# Patient Record
Sex: FEMALE | Race: WHITE | NOT HISPANIC OR LATINO | Employment: OTHER | URBAN - METROPOLITAN AREA
[De-identification: names, ages, dates, MRNs, and addresses within clinical notes are randomized per-mention and may not be internally consistent; named-entity substitution may affect disease eponyms.]

---

## 2017-01-30 ENCOUNTER — AMBULATORY - HEALTHEAST (OUTPATIENT)
Dept: CARDIOLOGY | Facility: CLINIC | Age: 67
End: 2017-01-30

## 2017-01-30 DIAGNOSIS — Z95.0 PACEMAKER: ICD-10-CM

## 2017-02-02 ENCOUNTER — COMMUNICATION - HEALTHEAST (OUTPATIENT)
Dept: FAMILY MEDICINE | Facility: CLINIC | Age: 67
End: 2017-02-02

## 2017-02-02 DIAGNOSIS — G47.09 OTHER INSOMNIA: ICD-10-CM

## 2017-03-20 ENCOUNTER — AMBULATORY - HEALTHEAST (OUTPATIENT)
Dept: CARDIOLOGY | Facility: CLINIC | Age: 67
End: 2017-03-20

## 2017-03-20 DIAGNOSIS — Z95.0 PACEMAKER: ICD-10-CM

## 2017-03-24 ENCOUNTER — OFFICE VISIT - HEALTHEAST (OUTPATIENT)
Dept: CARDIOLOGY | Facility: CLINIC | Age: 67
End: 2017-03-24

## 2017-03-24 DIAGNOSIS — I10 ESSENTIAL HYPERTENSION WITH GOAL BLOOD PRESSURE LESS THAN 140/90: ICD-10-CM

## 2017-03-24 DIAGNOSIS — R06.00 DYSPNEA: ICD-10-CM

## 2017-03-24 DIAGNOSIS — I49.5 SICK SINUS SYNDROME (H): ICD-10-CM

## 2017-03-24 ASSESSMENT — MIFFLIN-ST. JEOR: SCORE: 1224.83

## 2017-03-27 ENCOUNTER — AMBULATORY - HEALTHEAST (OUTPATIENT)
Dept: CARDIOLOGY | Facility: CLINIC | Age: 67
End: 2017-03-27

## 2017-03-27 DIAGNOSIS — R94.39 OTHER NONSPECIFIC ABNORMAL CARDIOVASCULAR SYSTEM FUNCTION STUDY: ICD-10-CM

## 2017-03-27 DIAGNOSIS — I42.9 CARDIOMYOPATHY (H): ICD-10-CM

## 2017-04-06 ENCOUNTER — COMMUNICATION - HEALTHEAST (OUTPATIENT)
Dept: CARDIOLOGY | Facility: CLINIC | Age: 67
End: 2017-04-06

## 2017-04-11 ENCOUNTER — HOSPITAL ENCOUNTER (OUTPATIENT)
Dept: CT IMAGING | Facility: CLINIC | Age: 67
Discharge: HOME | End: 2017-04-11
Attending: INTERNAL MEDICINE

## 2017-04-11 DIAGNOSIS — R94.39 OTHER NONSPECIFIC ABNORMAL CARDIOVASCULAR SYSTEM FUNCTION STUDY: ICD-10-CM

## 2017-04-11 LAB
BSA FOR ECHO PROCEDURE: 1.82 M2
LEFT VENTRICLE HEART RATE: 85 BPM

## 2017-04-11 ASSESSMENT — MIFFLIN-ST. JEOR: SCORE: 1231.64

## 2017-04-18 ENCOUNTER — COMMUNICATION - HEALTHEAST (OUTPATIENT)
Dept: FAMILY MEDICINE | Facility: CLINIC | Age: 67
End: 2017-04-18

## 2017-04-18 DIAGNOSIS — R06.02 SHORTNESS OF BREATH: ICD-10-CM

## 2017-04-19 ENCOUNTER — AMBULATORY - HEALTHEAST (OUTPATIENT)
Dept: PULMONOLOGY | Facility: OTHER | Age: 67
End: 2017-04-19

## 2017-04-19 DIAGNOSIS — R06.02 SOB (SHORTNESS OF BREATH): ICD-10-CM

## 2017-06-12 ENCOUNTER — OFFICE VISIT - HEALTHEAST (OUTPATIENT)
Dept: PULMONOLOGY | Facility: OTHER | Age: 67
End: 2017-06-12

## 2017-06-12 ENCOUNTER — RECORDS - HEALTHEAST (OUTPATIENT)
Dept: PULMONOLOGY | Facility: OTHER | Age: 67
End: 2017-06-12

## 2017-06-12 ENCOUNTER — RECORDS - HEALTHEAST (OUTPATIENT)
Dept: ADMINISTRATIVE | Facility: OTHER | Age: 67
End: 2017-06-12

## 2017-06-12 DIAGNOSIS — R06.02 SHORTNESS OF BREATH: ICD-10-CM

## 2017-06-12 DIAGNOSIS — R53.81 PHYSICAL DECONDITIONING: ICD-10-CM

## 2017-06-12 DIAGNOSIS — R06.09 DYSPNEA ON EXERTION: ICD-10-CM

## 2017-06-12 ASSESSMENT — MIFFLIN-ST. JEOR: SCORE: 1256.81

## 2017-07-17 ENCOUNTER — OFFICE VISIT - HEALTHEAST (OUTPATIENT)
Dept: FAMILY MEDICINE | Facility: CLINIC | Age: 67
End: 2017-07-17

## 2017-07-17 DIAGNOSIS — I10 HYPERTENSION: ICD-10-CM

## 2017-07-17 DIAGNOSIS — G47.09 OTHER INSOMNIA: ICD-10-CM

## 2017-07-17 DIAGNOSIS — R53.83 FATIGUE: ICD-10-CM

## 2017-07-17 DIAGNOSIS — Z00.00 HEALTHCARE MAINTENANCE: ICD-10-CM

## 2017-07-17 DIAGNOSIS — D64.9 ANEMIA: ICD-10-CM

## 2017-07-17 DIAGNOSIS — R73.9 HYPERGLYCEMIA: ICD-10-CM

## 2017-07-17 DIAGNOSIS — G45.4 TGA (TRANSIENT GLOBAL AMNESIA): ICD-10-CM

## 2017-07-17 LAB
CHOLEST SERPL-MCNC: 204 MG/DL
FASTING STATUS PATIENT QL REPORTED: YES
HDLC SERPL-MCNC: 78 MG/DL
LDLC SERPL CALC-MCNC: 111 MG/DL
TRIGL SERPL-MCNC: 75 MG/DL

## 2017-07-17 ASSESSMENT — MIFFLIN-ST. JEOR: SCORE: 1211.22

## 2017-07-18 LAB
HBA1C MFR BLD: 6 % (ref 4.2–6.1)
HCV RNA DETECT/QUANT, S - HISTORICAL: NORMAL IU/ML

## 2017-07-19 ENCOUNTER — COMMUNICATION - HEALTHEAST (OUTPATIENT)
Dept: FAMILY MEDICINE | Facility: CLINIC | Age: 67
End: 2017-07-19

## 2017-07-20 ENCOUNTER — AMBULATORY - HEALTHEAST (OUTPATIENT)
Dept: CARDIOLOGY | Facility: CLINIC | Age: 67
End: 2017-07-20

## 2017-07-20 DIAGNOSIS — Z95.0 CARDIAC PACEMAKER IN SITU: ICD-10-CM

## 2017-07-20 LAB — HCC DEVICE COMMENTS: NORMAL

## 2017-07-20 ASSESSMENT — MIFFLIN-ST. JEOR: SCORE: 1220.29

## 2017-09-19 ENCOUNTER — AMBULATORY - HEALTHEAST (OUTPATIENT)
Dept: NURSING | Facility: CLINIC | Age: 67
End: 2017-09-19

## 2017-09-19 DIAGNOSIS — Z23 NEEDS FLU SHOT: ICD-10-CM

## 2017-09-20 ENCOUNTER — HOSPITAL ENCOUNTER (OUTPATIENT)
Dept: MAMMOGRAPHY | Facility: HOSPITAL | Age: 67
Discharge: HOME | End: 2017-09-20
Attending: FAMILY MEDICINE

## 2017-09-20 DIAGNOSIS — Z12.31 VISIT FOR SCREENING MAMMOGRAM: ICD-10-CM

## 2017-09-25 ENCOUNTER — OFFICE VISIT - HEALTHEAST (OUTPATIENT)
Dept: CARDIOLOGY | Facility: CLINIC | Age: 67
End: 2017-09-25

## 2017-09-25 DIAGNOSIS — Z95.0 CARDIAC PACEMAKER IN SITU: ICD-10-CM

## 2017-09-25 DIAGNOSIS — I10 ESSENTIAL HYPERTENSION: ICD-10-CM

## 2017-09-25 ASSESSMENT — MIFFLIN-ST. JEOR: SCORE: 1242.97

## 2017-10-18 ENCOUNTER — AMBULATORY - HEALTHEAST (OUTPATIENT)
Dept: CARDIOLOGY | Facility: CLINIC | Age: 67
End: 2017-10-18

## 2017-10-18 DIAGNOSIS — Z95.0 CARDIAC PACEMAKER IN SITU: ICD-10-CM

## 2017-10-18 LAB — HCC DEVICE COMMENTS: NORMAL

## 2017-10-30 ENCOUNTER — COMMUNICATION - HEALTHEAST (OUTPATIENT)
Dept: FAMILY MEDICINE | Facility: CLINIC | Age: 67
End: 2017-10-30

## 2017-10-31 ENCOUNTER — OFFICE VISIT - HEALTHEAST (OUTPATIENT)
Dept: FAMILY MEDICINE | Facility: CLINIC | Age: 67
End: 2017-10-31

## 2017-10-31 DIAGNOSIS — B02.9 SHINGLES: ICD-10-CM

## 2017-10-31 ASSESSMENT — MIFFLIN-ST. JEOR: SCORE: 1252.04

## 2017-11-06 ENCOUNTER — COMMUNICATION - HEALTHEAST (OUTPATIENT)
Dept: FAMILY MEDICINE | Facility: CLINIC | Age: 67
End: 2017-11-06

## 2017-11-07 ENCOUNTER — COMMUNICATION - HEALTHEAST (OUTPATIENT)
Dept: FAMILY MEDICINE | Facility: CLINIC | Age: 67
End: 2017-11-07

## 2017-11-21 ENCOUNTER — OFFICE VISIT - HEALTHEAST (OUTPATIENT)
Dept: FAMILY MEDICINE | Facility: CLINIC | Age: 67
End: 2017-11-21

## 2017-11-21 DIAGNOSIS — B35.1 ONYCHOMYCOSIS: ICD-10-CM

## 2017-11-21 DIAGNOSIS — E61.1 IRON DEFICIENCY: ICD-10-CM

## 2017-11-21 DIAGNOSIS — G47.09 OTHER INSOMNIA: ICD-10-CM

## 2017-11-21 ASSESSMENT — MIFFLIN-ST. JEOR: SCORE: 1249.21

## 2018-01-22 ENCOUNTER — AMBULATORY - HEALTHEAST (OUTPATIENT)
Dept: LAB | Facility: CLINIC | Age: 68
End: 2018-01-22

## 2018-01-22 ENCOUNTER — HOSPITAL ENCOUNTER (OUTPATIENT)
Dept: LAB | Age: 68
Setting detail: SPECIMEN
Discharge: HOME | End: 2018-01-22

## 2018-01-22 DIAGNOSIS — E61.1 IRON DEFICIENCY: ICD-10-CM

## 2018-01-22 LAB
ERYTHROCYTE [DISTWIDTH] IN BLOOD BY AUTOMATED COUNT: 19.3 % (ref 11–14.5)
HCT VFR BLD AUTO: 45.5 % (ref 35–47)
HGB BLD-MCNC: 15.1 G/DL (ref 12–16)
IRON SATN MFR SERPL: 34 % (ref 20–50)
IRON SERPL-MCNC: 122 UG/DL (ref 42–175)
MCH RBC QN AUTO: 30 PG (ref 27–34)
MCHC RBC AUTO-ENTMCNC: 33.2 G/DL (ref 32–36)
MCV RBC AUTO: 90 FL (ref 80–100)
PLATELET # BLD AUTO: 232 THOU/UL (ref 140–440)
PMV BLD AUTO: 8.3 FL (ref 7–10)
RBC # BLD AUTO: 5.05 MILL/UL (ref 3.8–5.4)
TIBC SERPL-MCNC: 362 UG/DL (ref 313–563)
TRANSFERRIN SERPL-MCNC: 289 MG/DL (ref 212–360)
WBC: 5.4 THOU/UL (ref 4–11)

## 2018-01-24 ENCOUNTER — COMMUNICATION - HEALTHEAST (OUTPATIENT)
Dept: CARDIOLOGY | Facility: CLINIC | Age: 68
End: 2018-01-24

## 2018-01-24 DIAGNOSIS — I10 ESSENTIAL HYPERTENSION WITH GOAL BLOOD PRESSURE LESS THAN 140/90: ICD-10-CM

## 2018-01-25 ENCOUNTER — COMMUNICATION - HEALTHEAST (OUTPATIENT)
Dept: FAMILY MEDICINE | Facility: CLINIC | Age: 68
End: 2018-01-25

## 2018-01-29 ENCOUNTER — AMBULATORY - HEALTHEAST (OUTPATIENT)
Dept: CARDIOLOGY | Facility: CLINIC | Age: 68
End: 2018-01-29

## 2018-01-29 DIAGNOSIS — Z95.0 CARDIAC PACEMAKER IN SITU: ICD-10-CM

## 2018-01-29 LAB — HCC DEVICE COMMENTS: NORMAL

## 2018-01-30 ENCOUNTER — COMMUNICATION - HEALTHEAST (OUTPATIENT)
Dept: FAMILY MEDICINE | Facility: CLINIC | Age: 68
End: 2018-01-30

## 2018-01-30 DIAGNOSIS — D64.9 ANEMIA: ICD-10-CM

## 2018-02-05 ENCOUNTER — COMMUNICATION - HEALTHEAST (OUTPATIENT)
Dept: CARDIOLOGY | Facility: CLINIC | Age: 68
End: 2018-02-05

## 2018-02-05 DIAGNOSIS — I10 ESSENTIAL HYPERTENSION WITH GOAL BLOOD PRESSURE LESS THAN 140/90: ICD-10-CM

## 2018-02-08 ENCOUNTER — HOSPITAL ENCOUNTER (OUTPATIENT)
Dept: LAB | Age: 68
Setting detail: SPECIMEN
Discharge: HOME | End: 2018-02-08

## 2018-02-08 ENCOUNTER — AMBULATORY - HEALTHEAST (OUTPATIENT)
Dept: FAMILY MEDICINE | Facility: CLINIC | Age: 68
End: 2018-02-08

## 2018-02-08 ENCOUNTER — AMBULATORY - HEALTHEAST (OUTPATIENT)
Dept: LAB | Facility: CLINIC | Age: 68
End: 2018-02-08

## 2018-02-08 DIAGNOSIS — Z09 NEED FOR IMMUNIZATION FOLLOW-UP: ICD-10-CM

## 2018-02-09 ENCOUNTER — COMMUNICATION - HEALTHEAST (OUTPATIENT)
Dept: FAMILY MEDICINE | Facility: CLINIC | Age: 68
End: 2018-02-09

## 2018-02-09 LAB
MEV IGG SER IA-ACNC: NORMAL
MUV IGG SER QL IA: NORMAL
RUBV IGG SERPL QL IA: NORMAL
VZV IGG SER QL IA: NORMAL

## 2018-02-16 ENCOUNTER — RECORDS - HEALTHEAST (OUTPATIENT)
Dept: LAB | Facility: HOSPITAL | Age: 68
End: 2018-02-16

## 2018-02-19 LAB
QTF INTERPRETATION: NORMAL
QTF MITOGEN - NIL: >10 IU/ML
QTF NIL: 0.06 IU/ML
QTF RESULT: NEGATIVE
QTF TB ANTIGEN - NIL: 0.02 IU/ML

## 2018-02-22 ENCOUNTER — OFFICE VISIT - HEALTHEAST (OUTPATIENT)
Dept: FAMILY MEDICINE | Facility: CLINIC | Age: 68
End: 2018-02-22

## 2018-02-22 DIAGNOSIS — R32 INCONTINENCE: ICD-10-CM

## 2018-02-22 DIAGNOSIS — N89.8 ITCHING IN THE VAGINAL AREA: ICD-10-CM

## 2018-02-22 ASSESSMENT — MIFFLIN-ST. JEOR: SCORE: 1256.58

## 2018-04-09 ENCOUNTER — COMMUNICATION - HEALTHEAST (OUTPATIENT)
Dept: FAMILY MEDICINE | Facility: CLINIC | Age: 68
End: 2018-04-09

## 2018-04-09 ENCOUNTER — AMBULATORY - HEALTHEAST (OUTPATIENT)
Dept: LAB | Facility: CLINIC | Age: 68
End: 2018-04-09

## 2018-04-09 DIAGNOSIS — G45.4 TGA (TRANSIENT GLOBAL AMNESIA): ICD-10-CM

## 2018-04-09 DIAGNOSIS — G47.09 OTHER INSOMNIA: ICD-10-CM

## 2018-04-09 DIAGNOSIS — D64.9 ANEMIA: ICD-10-CM

## 2018-04-09 LAB
ERYTHROCYTE [DISTWIDTH] IN BLOOD BY AUTOMATED COUNT: 11.2 % (ref 11–14.5)
HCT VFR BLD AUTO: 42.8 % (ref 35–47)
HGB BLD-MCNC: 14.2 G/DL (ref 12–16)
MCH RBC QN AUTO: 32 PG (ref 27–34)
MCHC RBC AUTO-ENTMCNC: 33.3 G/DL (ref 32–36)
MCV RBC AUTO: 96 FL (ref 80–100)
PLATELET # BLD AUTO: 227 THOU/UL (ref 140–440)
PMV BLD AUTO: 7.7 FL (ref 7–10)
RBC # BLD AUTO: 4.45 MILL/UL (ref 3.8–5.4)
WBC: 5.1 THOU/UL (ref 4–11)

## 2018-04-20 ENCOUNTER — COMMUNICATION - HEALTHEAST (OUTPATIENT)
Dept: FAMILY MEDICINE | Facility: CLINIC | Age: 68
End: 2018-04-20

## 2018-04-27 ENCOUNTER — OFFICE VISIT - HEALTHEAST (OUTPATIENT)
Dept: FAMILY MEDICINE | Facility: CLINIC | Age: 68
End: 2018-04-27

## 2018-04-27 DIAGNOSIS — R05.9 COUGH: ICD-10-CM

## 2018-04-27 ASSESSMENT — MIFFLIN-ST. JEOR: SCORE: 1247.51

## 2018-05-07 ENCOUNTER — AMBULATORY - HEALTHEAST (OUTPATIENT)
Dept: CARDIOLOGY | Facility: CLINIC | Age: 68
End: 2018-05-07

## 2018-05-07 DIAGNOSIS — Z95.0 CARDIAC PACEMAKER IN SITU: ICD-10-CM

## 2018-05-07 LAB
HCC DEVICE COMMENTS: NORMAL
HCC DEVICE IMPLANTING PROVIDER: NORMAL
HCC DEVICE MANUFACTURE: NORMAL
HCC DEVICE MODEL: NORMAL
HCC DEVICE SERIAL NUMBER: NORMAL
HCC DEVICE TYPE: NORMAL

## 2018-05-08 ENCOUNTER — OFFICE VISIT - HEALTHEAST (OUTPATIENT)
Dept: FAMILY MEDICINE | Facility: CLINIC | Age: 68
End: 2018-05-08

## 2018-05-08 DIAGNOSIS — R05.9 COUGH: ICD-10-CM

## 2018-05-08 DIAGNOSIS — G45.4 TGA (TRANSIENT GLOBAL AMNESIA): ICD-10-CM

## 2018-05-08 ASSESSMENT — MIFFLIN-ST. JEOR: SCORE: 1244.39

## 2018-06-04 ENCOUNTER — OFFICE VISIT - HEALTHEAST (OUTPATIENT)
Dept: ENDOCRINOLOGY | Facility: CLINIC | Age: 68
End: 2018-06-04

## 2018-06-04 ENCOUNTER — COMMUNICATION - HEALTHEAST (OUTPATIENT)
Dept: GENERAL RADIOLOGY | Facility: CLINIC | Age: 68
End: 2018-06-04

## 2018-06-04 DIAGNOSIS — L65.9 HAIR LOSS: ICD-10-CM

## 2018-06-04 DIAGNOSIS — R05.9 COUGH: ICD-10-CM

## 2018-06-04 LAB
CREAT SERPL-MCNC: 0.74 MG/DL (ref 0.6–1.1)
ESTRADIOL SERPL-MCNC: <10 PG/ML
FERRITIN SERPL-MCNC: 43 NG/ML (ref 10–130)
FSH SERPL-ACNC: 47.7 MIU/ML
GFR SERPL CREATININE-BSD FRML MDRD: >60 ML/MIN/1.73M2
HBA1C MFR BLD: 5.5 % (ref 3.5–6)
IRON SERPL-MCNC: 88 UG/DL (ref 42–175)
LH SERPL-ACNC: 18.2 MIU/ML
POTASSIUM BLD-SCNC: 4 MMOL/L (ref 3.5–5)
T3 SERPL-MCNC: 64 NG/DL (ref 45–175)
T4 FREE SERPL-MCNC: 1 NG/DL (ref 0.7–1.8)
TSH SERPL DL<=0.005 MIU/L-ACNC: 2.6 UIU/ML (ref 0.3–5)
VIT B12 SERPL-MCNC: 349 PG/ML (ref 213–816)

## 2018-06-04 ASSESSMENT — MIFFLIN-ST. JEOR: SCORE: 1210.31

## 2018-06-06 ENCOUNTER — OFFICE VISIT - HEALTHEAST (OUTPATIENT)
Dept: FAMILY MEDICINE | Facility: CLINIC | Age: 68
End: 2018-06-06

## 2018-06-06 DIAGNOSIS — L55.9 SUNBURN: ICD-10-CM

## 2018-06-06 DIAGNOSIS — R05.9 COUGH: ICD-10-CM

## 2018-06-06 ASSESSMENT — MIFFLIN-ST. JEOR: SCORE: 1211.22

## 2018-06-07 LAB — ZINC SERPL-MCNC: 64 UG/DL (ref 60–120)

## 2018-06-11 ENCOUNTER — COMMUNICATION - HEALTHEAST (OUTPATIENT)
Dept: ADMINISTRATIVE | Facility: CLINIC | Age: 68
End: 2018-06-11

## 2018-06-11 DIAGNOSIS — E03.9 HYPOTHYROID: ICD-10-CM

## 2018-06-13 ENCOUNTER — COMMUNICATION - HEALTHEAST (OUTPATIENT)
Dept: ENDOCRINOLOGY | Facility: CLINIC | Age: 68
End: 2018-06-13

## 2018-06-13 DIAGNOSIS — E03.9 HYPOTHYROID: ICD-10-CM

## 2018-06-25 ENCOUNTER — OFFICE VISIT - HEALTHEAST (OUTPATIENT)
Dept: FAMILY MEDICINE | Facility: CLINIC | Age: 68
End: 2018-06-25

## 2018-06-25 DIAGNOSIS — H92.09 EAR PAIN: ICD-10-CM

## 2018-06-25 ASSESSMENT — MIFFLIN-ST. JEOR: SCORE: 1202.71

## 2018-08-13 ENCOUNTER — COMMUNICATION - HEALTHEAST (OUTPATIENT)
Dept: FAMILY MEDICINE | Facility: CLINIC | Age: 68
End: 2018-08-13

## 2018-08-13 DIAGNOSIS — G47.09 OTHER INSOMNIA: ICD-10-CM

## 2018-08-20 ENCOUNTER — AMBULATORY - HEALTHEAST (OUTPATIENT)
Dept: CARDIOLOGY | Facility: CLINIC | Age: 68
End: 2018-08-20

## 2018-08-20 ENCOUNTER — OFFICE VISIT - HEALTHEAST (OUTPATIENT)
Dept: CARDIOLOGY | Facility: CLINIC | Age: 68
End: 2018-08-20

## 2018-08-20 DIAGNOSIS — I10 ESSENTIAL HYPERTENSION: ICD-10-CM

## 2018-08-20 DIAGNOSIS — I10 ESSENTIAL HYPERTENSION WITH GOAL BLOOD PRESSURE LESS THAN 140/90: ICD-10-CM

## 2018-08-20 DIAGNOSIS — I49.5 SINOATRIAL NODE DYSFUNCTION (H): ICD-10-CM

## 2018-08-20 DIAGNOSIS — Z95.0 CARDIAC PACEMAKER IN SITU: ICD-10-CM

## 2018-08-20 ASSESSMENT — MIFFLIN-ST. JEOR: SCORE: 1184

## 2018-08-21 ENCOUNTER — RECORDS - HEALTHEAST (OUTPATIENT)
Dept: ADMINISTRATIVE | Facility: OTHER | Age: 68
End: 2018-08-21

## 2018-09-04 ENCOUNTER — COMMUNICATION - HEALTHEAST (OUTPATIENT)
Dept: CARDIOLOGY | Facility: CLINIC | Age: 68
End: 2018-09-04

## 2018-09-04 DIAGNOSIS — I10 ESSENTIAL HYPERTENSION WITH GOAL BLOOD PRESSURE LESS THAN 140/90: ICD-10-CM

## 2018-09-10 ENCOUNTER — OFFICE VISIT - HEALTHEAST (OUTPATIENT)
Dept: FAMILY MEDICINE | Facility: CLINIC | Age: 68
End: 2018-09-10

## 2018-09-10 DIAGNOSIS — Z13.220 ENCOUNTER FOR SCREENING FOR LIPOID DISORDERS: ICD-10-CM

## 2018-09-10 DIAGNOSIS — Z00.00 HEALTHCARE MAINTENANCE: ICD-10-CM

## 2018-09-10 DIAGNOSIS — Z00.00 ROUTINE GENERAL MEDICAL EXAMINATION AT A HEALTH CARE FACILITY: ICD-10-CM

## 2018-09-10 DIAGNOSIS — N32.81 OVERACTIVE BLADDER: ICD-10-CM

## 2018-09-10 DIAGNOSIS — I26.99 PULMONARY EMBOLISM (H): ICD-10-CM

## 2018-09-10 DIAGNOSIS — G45.4 TGA (TRANSIENT GLOBAL AMNESIA): ICD-10-CM

## 2018-09-10 DIAGNOSIS — I10 BENIGN ESSENTIAL HYPERTENSION: ICD-10-CM

## 2018-09-10 LAB
ANION GAP SERPL CALCULATED.3IONS-SCNC: 8 MMOL/L (ref 5–18)
BUN SERPL-MCNC: 17 MG/DL (ref 8–28)
CALCIUM SERPL-MCNC: 9.1 MG/DL (ref 8.5–10.5)
CHLORIDE BLD-SCNC: 106 MMOL/L (ref 98–107)
CHOLEST SERPL-MCNC: 195 MG/DL
CO2 SERPL-SCNC: 28 MMOL/L (ref 22–31)
CREAT SERPL-MCNC: 0.65 MG/DL (ref 0.6–1.1)
ERYTHROCYTE [DISTWIDTH] IN BLOOD BY AUTOMATED COUNT: 10.7 % (ref 11–14.5)
FASTING STATUS PATIENT QL REPORTED: YES
GFR SERPL CREATININE-BSD FRML MDRD: >60 ML/MIN/1.73M2
GLUCOSE BLD-MCNC: 98 MG/DL (ref 70–125)
HBA1C MFR BLD: 5.5 % (ref 3.5–6)
HCT VFR BLD AUTO: 43.4 % (ref 35–47)
HDLC SERPL-MCNC: 73 MG/DL
HGB BLD-MCNC: 14.6 G/DL (ref 12–16)
LDLC SERPL CALC-MCNC: 104 MG/DL
MCH RBC QN AUTO: 32.7 PG (ref 27–34)
MCHC RBC AUTO-ENTMCNC: 33.7 G/DL (ref 32–36)
MCV RBC AUTO: 97 FL (ref 80–100)
PLATELET # BLD AUTO: 214 THOU/UL (ref 140–440)
PMV BLD AUTO: 8.7 FL (ref 7–10)
POTASSIUM BLD-SCNC: 3.8 MMOL/L (ref 3.5–5)
RBC # BLD AUTO: 4.47 MILL/UL (ref 3.8–5.4)
SODIUM SERPL-SCNC: 142 MMOL/L (ref 136–145)
TRIGL SERPL-MCNC: 92 MG/DL
WBC: 4.8 THOU/UL (ref 4–11)

## 2018-09-10 ASSESSMENT — MIFFLIN-ST. JEOR: SCORE: 1172.38

## 2018-09-17 ENCOUNTER — COMMUNICATION - HEALTHEAST (OUTPATIENT)
Dept: FAMILY MEDICINE | Facility: CLINIC | Age: 68
End: 2018-09-17

## 2018-09-24 ENCOUNTER — HOSPITAL ENCOUNTER (OUTPATIENT)
Dept: MAMMOGRAPHY | Facility: CLINIC | Age: 68
Discharge: HOME | End: 2018-09-24
Attending: FAMILY MEDICINE

## 2018-09-24 DIAGNOSIS — Z12.31 VISIT FOR SCREENING MAMMOGRAM: ICD-10-CM

## 2018-10-03 ENCOUNTER — COMMUNICATION - HEALTHEAST (OUTPATIENT)
Dept: CARDIOLOGY | Facility: CLINIC | Age: 68
End: 2018-10-03

## 2018-10-04 ENCOUNTER — COMMUNICATION - HEALTHEAST (OUTPATIENT)
Dept: ENDOCRINOLOGY | Facility: CLINIC | Age: 68
End: 2018-10-04

## 2018-10-04 ENCOUNTER — RECORDS - HEALTHEAST (OUTPATIENT)
Dept: GENERAL RADIOLOGY | Facility: CLINIC | Age: 68
End: 2018-10-04

## 2018-10-04 ENCOUNTER — OFFICE VISIT - HEALTHEAST (OUTPATIENT)
Dept: FAMILY MEDICINE | Facility: CLINIC | Age: 68
End: 2018-10-04

## 2018-10-04 DIAGNOSIS — S69.91XA INJURY OF INDEX FINGER, RIGHT, INITIAL ENCOUNTER: ICD-10-CM

## 2018-10-04 DIAGNOSIS — S69.91XA UNSPECIFIED INJURY OF RIGHT WRIST, HAND AND FINGER(S), INITIAL ENCOUNTER: ICD-10-CM

## 2018-10-04 DIAGNOSIS — E03.9 HYPOTHYROID: ICD-10-CM

## 2018-10-04 ASSESSMENT — MIFFLIN-ST. JEOR: SCORE: 1180.03

## 2018-11-20 ENCOUNTER — AMBULATORY - HEALTHEAST (OUTPATIENT)
Dept: CARDIOLOGY | Facility: CLINIC | Age: 68
End: 2018-11-20

## 2018-11-20 DIAGNOSIS — Z95.0 CARDIAC PACEMAKER IN SITU: ICD-10-CM

## 2018-11-30 ENCOUNTER — AMBULATORY - HEALTHEAST (OUTPATIENT)
Dept: LAB | Facility: CLINIC | Age: 68
End: 2018-11-30

## 2018-11-30 DIAGNOSIS — L65.9 HAIR LOSS: ICD-10-CM

## 2018-11-30 LAB
T4 FREE SERPL-MCNC: 1 NG/DL (ref 0.7–1.8)
TSH SERPL DL<=0.005 MIU/L-ACNC: 1.39 UIU/ML (ref 0.3–5)

## 2018-12-10 ENCOUNTER — OFFICE VISIT - HEALTHEAST (OUTPATIENT)
Dept: ENDOCRINOLOGY | Facility: CLINIC | Age: 68
End: 2018-12-10

## 2018-12-10 DIAGNOSIS — E03.9 HYPOTHYROID: ICD-10-CM

## 2018-12-10 ASSESSMENT — MIFFLIN-ST. JEOR: SCORE: 1198.18

## 2018-12-17 ENCOUNTER — COMMUNICATION - HEALTHEAST (OUTPATIENT)
Dept: FAMILY MEDICINE | Facility: CLINIC | Age: 68
End: 2018-12-17

## 2018-12-17 DIAGNOSIS — G47.09 OTHER INSOMNIA: ICD-10-CM

## 2019-02-17 ENCOUNTER — AMBULATORY - HEALTHEAST (OUTPATIENT)
Dept: CARDIOLOGY | Facility: CLINIC | Age: 69
End: 2019-02-17

## 2019-02-17 DIAGNOSIS — Z95.0 CARDIAC PACEMAKER IN SITU: ICD-10-CM

## 2019-03-11 ENCOUNTER — COMMUNICATION - HEALTHEAST (OUTPATIENT)
Dept: FAMILY MEDICINE | Facility: CLINIC | Age: 69
End: 2019-03-11

## 2019-04-30 ENCOUNTER — RECORDS - HEALTHEAST (OUTPATIENT)
Dept: CARDIOLOGY | Facility: CLINIC | Age: 69
End: 2019-04-30

## 2019-04-30 ENCOUNTER — COMMUNICATION - HEALTHEAST (OUTPATIENT)
Dept: CARDIOLOGY | Facility: CLINIC | Age: 69
End: 2019-04-30

## 2019-04-30 ENCOUNTER — COMMUNICATION - HEALTHEAST (OUTPATIENT)
Dept: ENDOCRINOLOGY | Facility: CLINIC | Age: 69
End: 2019-04-30

## 2019-04-30 DIAGNOSIS — E03.9 HYPOTHYROID: ICD-10-CM

## 2019-04-30 DIAGNOSIS — I10 ESSENTIAL HYPERTENSION WITH GOAL BLOOD PRESSURE LESS THAN 140/90: ICD-10-CM

## 2019-05-20 ENCOUNTER — AMBULATORY - HEALTHEAST (OUTPATIENT)
Dept: ENDOCRINOLOGY | Facility: CLINIC | Age: 69
End: 2019-05-20

## 2019-05-20 DIAGNOSIS — L65.9 HAIR LOSS: ICD-10-CM

## 2019-05-20 DIAGNOSIS — M94.9 DISORDER OF BONE AND CARTILAGE: ICD-10-CM

## 2019-05-20 DIAGNOSIS — E55.9 VITAMIN D DEFICIENCY: ICD-10-CM

## 2019-05-20 DIAGNOSIS — M89.9 DISORDER OF BONE AND CARTILAGE: ICD-10-CM

## 2019-05-22 ENCOUNTER — AMBULATORY - HEALTHEAST (OUTPATIENT)
Dept: CARDIOLOGY | Facility: CLINIC | Age: 69
End: 2019-05-22

## 2019-05-22 DIAGNOSIS — Z95.0 CARDIAC PACEMAKER IN SITU: ICD-10-CM

## 2019-06-03 ENCOUNTER — AMBULATORY - HEALTHEAST (OUTPATIENT)
Dept: LAB | Facility: CLINIC | Age: 69
End: 2019-06-03

## 2019-06-03 DIAGNOSIS — L65.9 HAIR LOSS: ICD-10-CM

## 2019-06-03 DIAGNOSIS — E55.9 VITAMIN D DEFICIENCY: ICD-10-CM

## 2019-06-03 LAB
CALCIUM SERPL-MCNC: 9.2 MG/DL (ref 8.5–10.5)
CREAT SERPL-MCNC: 0.82 MG/DL (ref 0.6–1.1)
GFR SERPL CREATININE-BSD FRML MDRD: >60 ML/MIN/1.73M2
POTASSIUM BLD-SCNC: 4.1 MMOL/L (ref 3.5–5)
T4 FREE SERPL-MCNC: 0.9 NG/DL (ref 0.7–1.8)
TSH SERPL DL<=0.005 MIU/L-ACNC: 1.78 UIU/ML (ref 0.3–5)

## 2019-06-04 ENCOUNTER — COMMUNICATION - HEALTHEAST (OUTPATIENT)
Dept: ENDOCRINOLOGY | Facility: CLINIC | Age: 69
End: 2019-06-04

## 2019-06-04 LAB — 25(OH)D3 SERPL-MCNC: 46.1 NG/ML (ref 30–80)

## 2019-06-06 ENCOUNTER — COMMUNICATION - HEALTHEAST (OUTPATIENT)
Dept: ADMINISTRATIVE | Facility: CLINIC | Age: 69
End: 2019-06-06

## 2019-06-06 DIAGNOSIS — E03.9 HYPOTHYROID: ICD-10-CM

## 2019-06-19 ENCOUNTER — OFFICE VISIT - HEALTHEAST (OUTPATIENT)
Dept: FAMILY MEDICINE | Facility: CLINIC | Age: 69
End: 2019-06-19

## 2019-06-19 DIAGNOSIS — L90.0 LICHEN SCLEROSUS: ICD-10-CM

## 2019-06-19 DIAGNOSIS — M54.9 UPPER BACK PAIN ON RIGHT SIDE: ICD-10-CM

## 2019-06-19 DIAGNOSIS — M25.511 ACUTE PAIN OF RIGHT SHOULDER: ICD-10-CM

## 2019-07-24 ENCOUNTER — COMMUNICATION - HEALTHEAST (OUTPATIENT)
Dept: FAMILY MEDICINE | Facility: CLINIC | Age: 69
End: 2019-07-24

## 2019-07-24 ENCOUNTER — RECORDS - HEALTHEAST (OUTPATIENT)
Dept: GENERAL RADIOLOGY | Facility: CLINIC | Age: 69
End: 2019-07-24

## 2019-07-24 ENCOUNTER — OFFICE VISIT - HEALTHEAST (OUTPATIENT)
Dept: FAMILY MEDICINE | Facility: CLINIC | Age: 69
End: 2019-07-24

## 2019-07-24 DIAGNOSIS — M25.511 PAIN IN RIGHT SHOULDER: ICD-10-CM

## 2019-07-24 DIAGNOSIS — M25.511 ACUTE PAIN OF RIGHT SHOULDER: ICD-10-CM

## 2019-07-24 ASSESSMENT — MIFFLIN-ST. JEOR: SCORE: 1198.18

## 2019-07-25 ENCOUNTER — THERAPY VISIT (OUTPATIENT)
Dept: PHYSICAL THERAPY | Facility: CLINIC | Age: 69
End: 2019-07-25
Payer: COMMERCIAL

## 2019-07-25 DIAGNOSIS — M25.511 RIGHT SHOULDER PAIN: ICD-10-CM

## 2019-07-25 PROCEDURE — 97110 THERAPEUTIC EXERCISES: CPT | Mod: GP | Performed by: PHYSICAL THERAPIST

## 2019-07-25 PROCEDURE — 97161 PT EVAL LOW COMPLEX 20 MIN: CPT | Mod: GP | Performed by: PHYSICAL THERAPIST

## 2019-07-25 PROCEDURE — 97140 MANUAL THERAPY 1/> REGIONS: CPT | Mod: GP | Performed by: PHYSICAL THERAPIST

## 2019-07-25 NOTE — PROGRESS NOTES
[facility] Initial Evaluation  Subjective:  The history is provided by the patient.   Desi Vega being seen for Shoulder blade pain.   Date of Onset: 2 months ago. Problem occurred: Insidious  and reported as 9/10 on pain scale. General health as reported by patient is good. Pertinent medical history includes:  Anemia, depression, history of fractures, implanted device, numbness/tingling and stroke.   Other medical allergies details: Aspirin.  Surgeries include:  Orthopedic surgery and heart surgery. Other surgery history details: Knee and pacemaker.  Current medications:  High blood pressure medication, muscle relaxants and thyroid medication. Other medications details: Plavix.   Primary job tasks include:  Lifting/carrying and repetitive tasks.  Pain is described as aching and is constant. Pain is worse during the day. Since onset symptoms are gradually worsening.  Previous treatment includes other. There was significant (muscle relaxants) improvement following previous treatment.   Patient is retired .   Barriers include:  None as reported by patient.  Red flags:  None as reported by patient.  Type of problem:  Right shoulder   Condition occurred with:  Unknown cause.    Problem details: Began about 2 months ago, had muscle spasm. Had set up PT visit at that point, but pain resolved before she was able to get evaluated, so she cancelled her appointment. This was about 1 month ago. Pain returned recently and saw MD who screened out a fracture yesterday, referred her to PT to address this pain. .   Patient reports pain:  Scapular area. Radiates to:  Elbow. Associated symptoms:  Loss of motion/stiffness and loss of strength. Symptoms are exacerbated by carrying, lifting and certain positions (rotating head to the left) and relieved by muscle relaxants, ice and heat.                      Objective:  System                   Shoulder Evaluation:  ROM:  AROM:    Flexion:  Right:  WNL  with some pain at end range    Abduction:  Right:  100    Internal Rotation:  Right:  WNL but discomfort  External Rotation:  Right:  WNL with more discomfort                  Pain: at end range abduction, ER    Strength:    Flexion: Right: 4+/5      Pain:  +    Abduction:  Right: 4/5      Pain:+          Horizontal Adduction:  Right:3+/5     Pain:          Palpation:      Right shoulder tenderness present at: Rhomboids (and erector spinae - increased muscle tone and trigger points present)                                         General Evaluation:                        Posture:    Standing:   Rounded shoulders and thoracic kyphosis increased  Sitting:  Rounded shoulders and thoracic kyphosis increased                                           ROS    Assessment/Plan:    Patient is a 69 year old female with right side shoulder complaints.    Patient has the following significant findings with corresponding treatment plan.                Diagnosis 1:  R shoulder pain, consistent with rhomboid muscle spasm and injury  Pain -  manual therapy and home program  Decreased ROM/flexibility - manual therapy, therapeutic exercise, therapeutic activity and home program  Decreased joint mobility - manual therapy, therapeutic exercise, therapeutic activity and home program  Decreased strength - therapeutic exercise, therapeutic activities and home program  Impaired muscle performance - neuro re-education and home program  Decreased function - therapeutic activities and home program  Impaired posture - neuro re-education, therapeutic activities and home program    Therapy Evaluation Codes:     Cumulative Therapy Evaluation is: Low complexity.    Previous and current functional limitations:  (See Goal Flow Sheet for this information)    Short term and Long term goals: (See Goal Flow Sheet for this information)     Communication ability:  Patient appears to be able to clearly communicate and understand verbal and written  communication and follow directions correctly.  Treatment Explanation - The following has been discussed with the patient:   RX ordered/plan of care  Anticipated outcomes  Possible risks and side effects  This patient would benefit from PT intervention to resume normal activities.   Rehab potential is good.    Frequency:  2 X week, once daily  Duration:  for 2 weeks tapering to 1 X a week over 2 weeks  Discharge Plan:  Achieve all LTG.  Independent in home treatment program.  Reach maximal therapeutic benefit.    Please refer to the daily flowsheet for treatment today, total treatment time and time spent performing 1:1 timed codes.

## 2019-07-31 ENCOUNTER — THERAPY VISIT (OUTPATIENT)
Dept: PHYSICAL THERAPY | Facility: CLINIC | Age: 69
End: 2019-07-31
Payer: COMMERCIAL

## 2019-07-31 DIAGNOSIS — M25.511 RIGHT SHOULDER PAIN: ICD-10-CM

## 2019-07-31 PROCEDURE — 97140 MANUAL THERAPY 1/> REGIONS: CPT | Mod: GP | Performed by: PHYSICAL THERAPIST

## 2019-07-31 PROCEDURE — 97112 NEUROMUSCULAR REEDUCATION: CPT | Mod: GP | Performed by: PHYSICAL THERAPIST

## 2019-07-31 PROCEDURE — 97110 THERAPEUTIC EXERCISES: CPT | Mod: GP | Performed by: PHYSICAL THERAPIST

## 2019-08-07 ENCOUNTER — THERAPY VISIT (OUTPATIENT)
Dept: PHYSICAL THERAPY | Facility: CLINIC | Age: 69
End: 2019-08-07
Payer: COMMERCIAL

## 2019-08-07 DIAGNOSIS — M25.511 RIGHT SHOULDER PAIN: ICD-10-CM

## 2019-08-07 PROCEDURE — 97110 THERAPEUTIC EXERCISES: CPT | Mod: GP | Performed by: PHYSICAL THERAPIST

## 2019-08-07 PROCEDURE — 97112 NEUROMUSCULAR REEDUCATION: CPT | Mod: GP | Performed by: PHYSICAL THERAPIST

## 2019-08-07 NOTE — PROGRESS NOTES
DISCHARGE REPORT    Progress reporting period is from 7/25/19 to 8/7/19.       SUBJECTIVE    Desi has great presentation today, only a very little twinge in the morning that relieves with cross body stretch. Feels comfortable managing care through HEP.      Current Pain level: 0/10.      Initial Pain level: 9/10.   Changes in function:  Yes (See Goal flowsheet attached for changes in current functional level)  Adverse reaction to treatment or activity: None    OBJECTIVE  Changes noted in objective findings:  Yes,    Very mild tenderness in rhomboid with minor trigger point. Sits with fair posture, able to respond to cueing, no pain with shoulder ROM or strength measures     ASSESSMENT/PLAN  Updated problem list and treatment plan: Diagnosis 1:  R shoulder pain, consistent with rhomboid muscle spasm and injury    Decreased strength - home program  Impaired posture - home program  STG/LTGs have been met or progress has been made towards goals:  Yes (See Goal flow sheet completed today.)  Assessment of Progress: The patient has met all of their long term goals.  Self Management Plans:  Patient is independent in a home treatment program.  I have re-evaluated this patient and find that the nature, scope, duration and intensity of the therapy is appropriate for the medical condition of the patient.  Desi continues to require the following intervention to meet STG and LTG's:  PT intervention is no longer required to meet STG/LTG.    Recommendations:  This patient is ready to be discharged from therapy and continue their home treatment program.    Please refer to the daily flowsheet for treatment today, total treatment time and time spent performing 1:1 timed codes.

## 2019-08-15 ENCOUNTER — OFFICE VISIT - HEALTHEAST (OUTPATIENT)
Dept: FAMILY MEDICINE | Facility: CLINIC | Age: 69
End: 2019-08-15

## 2019-08-15 DIAGNOSIS — H10.32 ACUTE CONJUNCTIVITIS OF LEFT EYE, UNSPECIFIED ACUTE CONJUNCTIVITIS TYPE: ICD-10-CM

## 2019-08-15 ASSESSMENT — MIFFLIN-ST. JEOR: SCORE: 1180.03

## 2019-08-19 ENCOUNTER — COMMUNICATION - HEALTHEAST (OUTPATIENT)
Dept: LAB | Facility: CLINIC | Age: 69
End: 2019-08-19

## 2019-08-19 DIAGNOSIS — L65.9 HAIR LOSS: ICD-10-CM

## 2019-08-22 ENCOUNTER — AMBULATORY - HEALTHEAST (OUTPATIENT)
Dept: FAMILY MEDICINE | Facility: CLINIC | Age: 69
End: 2019-08-22

## 2019-08-22 ENCOUNTER — OFFICE VISIT - HEALTHEAST (OUTPATIENT)
Dept: CARDIOLOGY | Facility: CLINIC | Age: 69
End: 2019-08-22

## 2019-08-22 ENCOUNTER — AMBULATORY - HEALTHEAST (OUTPATIENT)
Dept: CARDIOLOGY | Facility: CLINIC | Age: 69
End: 2019-08-22

## 2019-08-22 DIAGNOSIS — Z95.0 CARDIAC PACEMAKER IN SITU: ICD-10-CM

## 2019-08-22 DIAGNOSIS — I10 ESSENTIAL HYPERTENSION WITH GOAL BLOOD PRESSURE LESS THAN 140/90: ICD-10-CM

## 2019-08-22 DIAGNOSIS — I49.5 SINOATRIAL NODE DYSFUNCTION (H): ICD-10-CM

## 2019-08-22 DIAGNOSIS — I51.9 LV DYSFUNCTION: ICD-10-CM

## 2019-08-22 DIAGNOSIS — G45.4 TGA (TRANSIENT GLOBAL AMNESIA): ICD-10-CM

## 2019-08-22 ASSESSMENT — MIFFLIN-ST. JEOR: SCORE: 1175.5

## 2019-09-03 ENCOUNTER — COMMUNICATION - HEALTHEAST (OUTPATIENT)
Dept: SCHEDULING | Facility: CLINIC | Age: 69
End: 2019-09-03

## 2019-09-03 ENCOUNTER — COMMUNICATION - HEALTHEAST (OUTPATIENT)
Dept: CARDIOLOGY | Facility: CLINIC | Age: 69
End: 2019-09-03

## 2019-09-03 DIAGNOSIS — I10 ESSENTIAL HYPERTENSION WITH GOAL BLOOD PRESSURE LESS THAN 140/90: ICD-10-CM

## 2019-09-03 DIAGNOSIS — E03.9 HYPOTHYROID: ICD-10-CM

## 2019-09-06 ENCOUNTER — COMMUNICATION - HEALTHEAST (OUTPATIENT)
Dept: FAMILY MEDICINE | Facility: CLINIC | Age: 69
End: 2019-09-06

## 2019-09-06 ENCOUNTER — COMMUNICATION - HEALTHEAST (OUTPATIENT)
Dept: CARDIOLOGY | Facility: CLINIC | Age: 69
End: 2019-09-06

## 2019-09-06 DIAGNOSIS — I10 ESSENTIAL HYPERTENSION WITH GOAL BLOOD PRESSURE LESS THAN 140/90: ICD-10-CM

## 2019-09-06 DIAGNOSIS — E03.9 HYPOTHYROID: ICD-10-CM

## 2019-09-10 ENCOUNTER — COMMUNICATION - HEALTHEAST (OUTPATIENT)
Dept: FAMILY MEDICINE | Facility: CLINIC | Age: 69
End: 2019-09-10

## 2019-09-10 ENCOUNTER — HOSPITAL ENCOUNTER (OUTPATIENT)
Dept: CARDIOLOGY | Facility: HOSPITAL | Age: 69
Discharge: HOME | End: 2019-09-10
Attending: INTERNAL MEDICINE

## 2019-09-10 DIAGNOSIS — I51.9 LV DYSFUNCTION: ICD-10-CM

## 2019-09-10 DIAGNOSIS — E03.9 HYPOTHYROID: ICD-10-CM

## 2019-09-10 LAB
AORTIC ROOT: 2.9 CM
AORTIC VALVE MEAN VELOCITY: 95.3 CM/S
AV DIMENSIONLESS INDEX VTI: 0.7
AV MEAN GRADIENT: 4 MMHG
AV PEAK GRADIENT: 6 MMHG
AV VALVE AREA: 2.2 CM2
AV VELOCITY RATIO: 0.7
BSA FOR ECHO PROCEDURE: 1.76 M2
CV BLOOD PRESSURE: ABNORMAL MMHG
CV ECHO HEIGHT: 63.8 IN
CV ECHO WEIGHT: 152 LBS
DOP CALC AO PEAK VEL: 122 CM/S
DOP CALC AO VTI: 29.5 CM
DOP CALC LVOT AREA: 3.14 CM2
DOP CALC LVOT DIAMETER: 2 CM
DOP CALC LVOT PEAK VEL: 83.5 CM/S
DOP CALC LVOT STROKE VOLUME: 64.4 CM3
DOP CALCLVOT PEAK VEL VTI: 20.5 CM
ECHO EJECTION FRACTION ESTIMATED: 45 %
EJECTION FRACTION: 52 % (ref 55–75)
FRACTIONAL SHORTENING: 17.5 % (ref 28–44)
INTERVENTRICULAR SEPTUM IN END DIASTOLE: 1 CM (ref 0.6–0.9)
IVS/PW RATIO: 1
LA AREA 1: 10 CM2
LA AREA 2: 15 CM2
LEFT ATRIUM LENGTH: 3.7 CM
LEFT ATRIUM SIZE: 2.8 CM
LEFT ATRIUM VOLUME INDEX: 19.6 ML/M2
LEFT ATRIUM VOLUME: 34.5 ML
LEFT VENTRICLE CARDIAC INDEX: 3 L/MIN/M2
LEFT VENTRICLE CARDIAC OUTPUT: 5.3 L/MIN
LEFT VENTRICLE DIASTOLIC VOLUME INDEX: 40.9 CM3/M2 (ref 29–61)
LEFT VENTRICLE DIASTOLIC VOLUME: 71.9 CM3 (ref 46–106)
LEFT VENTRICLE HEART RATE: 83 BPM
LEFT VENTRICLE MASS INDEX: 86.4 G/M2
LEFT VENTRICLE SYSTOLIC VOLUME INDEX: 19.7 CM3/M2 (ref 8–24)
LEFT VENTRICLE SYSTOLIC VOLUME: 34.7 CM3 (ref 14–42)
LEFT VENTRICULAR INTERNAL DIMENSION IN DIASTOLE: 4.56 CM (ref 3.8–5.2)
LEFT VENTRICULAR INTERNAL DIMENSION IN SYSTOLE: 3.76 CM (ref 2.2–3.5)
LEFT VENTRICULAR MASS: 152 G
LEFT VENTRICULAR OUTFLOW TRACT MEAN GRADIENT: 2 MMHG
LEFT VENTRICULAR OUTFLOW TRACT MEAN VELOCITY: 64.3 CM/S
LEFT VENTRICULAR OUTFLOW TRACT PEAK GRADIENT: 3 MMHG
LEFT VENTRICULAR POSTERIOR WALL IN END DIASTOLE: 0.96 CM (ref 0.6–0.9)
LV STROKE VOLUME INDEX: 36.6 ML/M2
MITRAL VALVE E/A RATIO: 0.9
MV AVERAGE E/E' RATIO: 8.4 CM/S
MV DECELERATION TIME: 254 MS
MV E'TISSUE VEL-LAT: 6.58 CM/S
MV E'TISSUE VEL-MED: 5.9 CM/S
MV LATERAL E/E' RATIO: 8
MV MEDIAL E/E' RATIO: 8.9
MV PEAK A VELOCITY: 59.7 CM/S
MV PEAK E VELOCITY: 52.4 CM/S
NUC REST DIASTOLIC VOLUME INDEX: 2432 LBS
NUC REST SYSTOLIC VOLUME INDEX: 63.75 IN
TRICUSPID REGURGITATION PEAK PRESSURE GRADIENT: 16.3 MMHG
TRICUSPID VALVE ANULAR PLANE SYSTOLIC EXCURSION: 1.5 CM
TRICUSPID VALVE PEAK REGURGITANT VELOCITY: 202 CM/S

## 2019-09-10 ASSESSMENT — MIFFLIN-ST. JEOR: SCORE: 1175.5

## 2019-09-13 ENCOUNTER — COMMUNICATION - HEALTHEAST (OUTPATIENT)
Dept: FAMILY MEDICINE | Facility: CLINIC | Age: 69
End: 2019-09-13

## 2019-09-13 ENCOUNTER — AMBULATORY - HEALTHEAST (OUTPATIENT)
Dept: LAB | Facility: CLINIC | Age: 69
End: 2019-09-13

## 2019-09-13 DIAGNOSIS — L65.9 HAIR LOSS: ICD-10-CM

## 2019-09-13 LAB — TSH SERPL DL<=0.005 MIU/L-ACNC: 1.51 UIU/ML (ref 0.3–5)

## 2019-09-20 ENCOUNTER — OFFICE VISIT - HEALTHEAST (OUTPATIENT)
Dept: FAMILY MEDICINE | Facility: CLINIC | Age: 69
End: 2019-09-20

## 2019-09-20 DIAGNOSIS — Z00.00 ROUTINE GENERAL MEDICAL EXAMINATION AT A HEALTH CARE FACILITY: ICD-10-CM

## 2019-09-20 DIAGNOSIS — L65.9 HAIR LOSS: ICD-10-CM

## 2019-09-20 DIAGNOSIS — G45.4 TGA (TRANSIENT GLOBAL AMNESIA): ICD-10-CM

## 2019-09-20 ASSESSMENT — MIFFLIN-ST. JEOR: SCORE: 1176.35

## 2019-11-19 ENCOUNTER — AMBULATORY - HEALTHEAST (OUTPATIENT)
Dept: CARDIOLOGY | Facility: CLINIC | Age: 69
End: 2019-11-19

## 2019-11-19 DIAGNOSIS — I49.5 SICK SINUS SYNDROME (H): ICD-10-CM

## 2019-11-19 DIAGNOSIS — Z95.0 CARDIAC PACEMAKER IN SITU: ICD-10-CM

## 2019-12-09 ENCOUNTER — HOSPITAL ENCOUNTER (OUTPATIENT)
Dept: MAMMOGRAPHY | Facility: CLINIC | Age: 69
Discharge: HOME | End: 2019-12-09
Attending: FAMILY MEDICINE

## 2019-12-09 DIAGNOSIS — Z12.31 VISIT FOR SCREENING MAMMOGRAM: ICD-10-CM

## 2019-12-16 ENCOUNTER — COMMUNICATION - HEALTHEAST (OUTPATIENT)
Dept: FAMILY MEDICINE | Facility: CLINIC | Age: 69
End: 2019-12-16

## 2019-12-17 ENCOUNTER — OFFICE VISIT - HEALTHEAST (OUTPATIENT)
Dept: FAMILY MEDICINE | Facility: CLINIC | Age: 69
End: 2019-12-17

## 2019-12-17 DIAGNOSIS — J01.00 ACUTE NON-RECURRENT MAXILLARY SINUSITIS: ICD-10-CM

## 2019-12-17 ASSESSMENT — MIFFLIN-ST. JEOR: SCORE: 1171.81

## 2019-12-30 ENCOUNTER — OFFICE VISIT - HEALTHEAST (OUTPATIENT)
Dept: FAMILY MEDICINE | Facility: CLINIC | Age: 69
End: 2019-12-30

## 2019-12-30 ENCOUNTER — COMMUNICATION - HEALTHEAST (OUTPATIENT)
Dept: FAMILY MEDICINE | Facility: CLINIC | Age: 69
End: 2019-12-30

## 2019-12-30 DIAGNOSIS — R05.9 COUGH: ICD-10-CM

## 2019-12-30 DIAGNOSIS — R53.83 FATIGUE, UNSPECIFIED TYPE: ICD-10-CM

## 2019-12-30 DIAGNOSIS — R19.7 DIARRHEA, UNSPECIFIED TYPE: ICD-10-CM

## 2019-12-30 LAB
ANION GAP SERPL CALCULATED.3IONS-SCNC: 7 MMOL/L (ref 5–18)
BUN SERPL-MCNC: 14 MG/DL (ref 8–28)
CALCIUM SERPL-MCNC: 9.7 MG/DL (ref 8.5–10.5)
CHLORIDE BLD-SCNC: 106 MMOL/L (ref 98–107)
CO2 SERPL-SCNC: 29 MMOL/L (ref 22–31)
CREAT SERPL-MCNC: 0.82 MG/DL (ref 0.6–1.1)
GFR SERPL CREATININE-BSD FRML MDRD: >60 ML/MIN/1.73M2
GLUCOSE BLD-MCNC: 99 MG/DL (ref 70–125)
POTASSIUM BLD-SCNC: 4.7 MMOL/L (ref 3.5–5)
SODIUM SERPL-SCNC: 142 MMOL/L (ref 136–145)

## 2019-12-30 ASSESSMENT — MIFFLIN-ST. JEOR: SCORE: 1153.38

## 2019-12-31 ENCOUNTER — COMMUNICATION - HEALTHEAST (OUTPATIENT)
Dept: FAMILY MEDICINE | Facility: CLINIC | Age: 69
End: 2019-12-31

## 2019-12-31 ENCOUNTER — AMBULATORY - HEALTHEAST (OUTPATIENT)
Dept: LAB | Facility: CLINIC | Age: 69
End: 2019-12-31

## 2019-12-31 DIAGNOSIS — R19.7 DIARRHEA, UNSPECIFIED TYPE: ICD-10-CM

## 2019-12-31 LAB
C DIFF TOX B STL QL: NEGATIVE
RIBOTYPE 027/NAP1/BI: NORMAL

## 2020-01-17 ENCOUNTER — COMMUNICATION - HEALTHEAST (OUTPATIENT)
Dept: SCHEDULING | Facility: CLINIC | Age: 70
End: 2020-01-17

## 2020-01-17 DIAGNOSIS — J01.00 ACUTE NON-RECURRENT MAXILLARY SINUSITIS: ICD-10-CM

## 2020-01-24 ENCOUNTER — OFFICE VISIT - HEALTHEAST (OUTPATIENT)
Dept: FAMILY MEDICINE | Facility: CLINIC | Age: 70
End: 2020-01-24

## 2020-01-24 DIAGNOSIS — J01.01 ACUTE RECURRENT MAXILLARY SINUSITIS: ICD-10-CM

## 2020-02-07 ENCOUNTER — AMBULATORY - HEALTHEAST (OUTPATIENT)
Dept: CARDIOLOGY | Facility: CLINIC | Age: 70
End: 2020-02-07

## 2020-02-07 DIAGNOSIS — Z95.0 CARDIAC PACEMAKER IN SITU: ICD-10-CM

## 2020-02-07 DIAGNOSIS — I49.5 SINOATRIAL NODE DYSFUNCTION (H): ICD-10-CM

## 2020-06-12 ENCOUNTER — AMBULATORY - HEALTHEAST (OUTPATIENT)
Dept: CARDIOLOGY | Facility: CLINIC | Age: 70
End: 2020-06-12

## 2020-06-12 DIAGNOSIS — I51.9 LV DYSFUNCTION: ICD-10-CM

## 2020-06-12 DIAGNOSIS — Z95.0 CARDIAC PACEMAKER IN SITU: ICD-10-CM

## 2020-06-12 DIAGNOSIS — I49.5 SINOATRIAL NODE DYSFUNCTION (H): ICD-10-CM

## 2020-08-03 ENCOUNTER — COMMUNICATION - HEALTHEAST (OUTPATIENT)
Dept: LAB | Facility: CLINIC | Age: 70
End: 2020-08-03

## 2020-08-03 DIAGNOSIS — E03.9 HYPOTHYROIDISM, UNSPECIFIED TYPE: ICD-10-CM

## 2020-08-03 DIAGNOSIS — I10 ESSENTIAL HYPERTENSION: ICD-10-CM

## 2020-08-05 ENCOUNTER — AMBULATORY - HEALTHEAST (OUTPATIENT)
Dept: LAB | Facility: CLINIC | Age: 70
End: 2020-08-05

## 2020-08-05 ENCOUNTER — COMMUNICATION - HEALTHEAST (OUTPATIENT)
Dept: FAMILY MEDICINE | Facility: CLINIC | Age: 70
End: 2020-08-05

## 2020-08-05 DIAGNOSIS — I10 ESSENTIAL HYPERTENSION: ICD-10-CM

## 2020-08-05 DIAGNOSIS — E03.9 HYPOTHYROIDISM, UNSPECIFIED TYPE: ICD-10-CM

## 2020-08-05 LAB
ANION GAP SERPL CALCULATED.3IONS-SCNC: 9 MMOL/L (ref 5–18)
BUN SERPL-MCNC: 11 MG/DL (ref 8–28)
CALCIUM SERPL-MCNC: 9.1 MG/DL (ref 8.5–10.5)
CHLORIDE BLD-SCNC: 107 MMOL/L (ref 98–107)
CHOLEST SERPL-MCNC: 162 MG/DL
CO2 SERPL-SCNC: 28 MMOL/L (ref 22–31)
CREAT SERPL-MCNC: 0.73 MG/DL (ref 0.6–1.1)
FASTING STATUS PATIENT QL REPORTED: NORMAL
GFR SERPL CREATININE-BSD FRML MDRD: >60 ML/MIN/1.73M2
GLUCOSE BLD-MCNC: 92 MG/DL (ref 70–125)
HDLC SERPL-MCNC: 62 MG/DL
LDLC SERPL CALC-MCNC: 76 MG/DL
POTASSIUM BLD-SCNC: 4.9 MMOL/L (ref 3.5–5)
SODIUM SERPL-SCNC: 144 MMOL/L (ref 136–145)
TRIGL SERPL-MCNC: 122 MG/DL
TSH SERPL DL<=0.005 MIU/L-ACNC: 0.97 UIU/ML (ref 0.3–5)

## 2020-08-06 ENCOUNTER — COMMUNICATION - HEALTHEAST (OUTPATIENT)
Dept: FAMILY MEDICINE | Facility: CLINIC | Age: 70
End: 2020-08-06

## 2020-08-07 ENCOUNTER — COMMUNICATION - HEALTHEAST (OUTPATIENT)
Dept: CARDIOLOGY | Facility: CLINIC | Age: 70
End: 2020-08-07

## 2020-08-10 ENCOUNTER — RECORDS - HEALTHEAST (OUTPATIENT)
Dept: ADMINISTRATIVE | Facility: OTHER | Age: 70
End: 2020-08-10

## 2020-08-10 ENCOUNTER — COMMUNICATION - HEALTHEAST (OUTPATIENT)
Dept: FAMILY MEDICINE | Facility: CLINIC | Age: 70
End: 2020-08-10

## 2020-08-10 ENCOUNTER — OFFICE VISIT - HEALTHEAST (OUTPATIENT)
Dept: CARDIOLOGY | Facility: CLINIC | Age: 70
End: 2020-08-10

## 2020-08-10 DIAGNOSIS — I10 ESSENTIAL HYPERTENSION WITH GOAL BLOOD PRESSURE LESS THAN 140/90: ICD-10-CM

## 2020-08-10 DIAGNOSIS — E03.9 HYPOTHYROID: ICD-10-CM

## 2020-08-10 DIAGNOSIS — I42.9 SECONDARY CARDIOMYOPATHY (H): ICD-10-CM

## 2020-08-10 ASSESSMENT — MIFFLIN-ST. JEOR: SCORE: 1170.96

## 2020-10-12 ENCOUNTER — OFFICE VISIT - HEALTHEAST (OUTPATIENT)
Dept: FAMILY MEDICINE | Facility: CLINIC | Age: 70
End: 2020-10-12

## 2020-10-12 ENCOUNTER — AMBULATORY - HEALTHEAST (OUTPATIENT)
Dept: CARDIOLOGY | Facility: CLINIC | Age: 70
End: 2020-10-12

## 2020-10-12 DIAGNOSIS — I49.5 SINOATRIAL NODE DYSFUNCTION (H): ICD-10-CM

## 2020-10-12 DIAGNOSIS — Z00.00 ROUTINE GENERAL MEDICAL EXAMINATION AT A HEALTH CARE FACILITY: ICD-10-CM

## 2020-10-12 DIAGNOSIS — Z95.0 CARDIAC PACEMAKER IN SITU: ICD-10-CM

## 2020-10-12 DIAGNOSIS — L65.9 HAIR LOSS: ICD-10-CM

## 2020-10-12 ASSESSMENT — ANXIETY QUESTIONNAIRES
1. FEELING NERVOUS, ANXIOUS, OR ON EDGE: NOT AT ALL
2. NOT BEING ABLE TO STOP OR CONTROL WORRYING: NOT AT ALL

## 2020-10-12 ASSESSMENT — MIFFLIN-ST. JEOR: SCORE: 1157.35

## 2020-10-13 ENCOUNTER — HOSPITAL ENCOUNTER (OUTPATIENT)
Dept: CARDIOLOGY | Facility: HOSPITAL | Age: 70
Discharge: HOME | End: 2020-10-13
Attending: INTERNAL MEDICINE

## 2020-10-13 DIAGNOSIS — I42.9 SECONDARY CARDIOMYOPATHY (H): ICD-10-CM

## 2020-10-13 LAB
AORTIC ROOT: 2.9 CM
AORTIC VALVE MEAN VELOCITY: 102 CM/S
AV DIMENSIONLESS INDEX VTI: 0.6
AV MEAN GRADIENT: 5 MMHG
AV PEAK GRADIENT: 7.5 MMHG
AV VALVE AREA: 1.7 CM2
AV VELOCITY RATIO: 0.6
BSA FOR ECHO PROCEDURE: 1.74 M2
CV BLOOD PRESSURE: NORMAL MMHG
CV ECHO HEIGHT: 63.8 IN
CV ECHO WEIGHT: 148 LBS
DOP CALC AO PEAK VEL: 137 CM/S
DOP CALC AO VTI: 29.4 CM
DOP CALC LVOT AREA: 2.83 CM2
DOP CALC LVOT DIAMETER: 1.9 CM
DOP CALC LVOT PEAK VEL: 85.7 CM/S
DOP CALC LVOT STROKE VOLUME: 49 CM3
DOP CALCLVOT PEAK VEL VTI: 17.3 CM
ECHO EJECTION FRACTION ESTIMATED: 50 %
EJECTION FRACTION: 56 % (ref 55–75)
FRACTIONAL SHORTENING: 31.4 % (ref 28–44)
INTERVENTRICULAR SEPTUM IN END DIASTOLE: 0.8 CM (ref 0.6–0.9)
IVS/PW RATIO: 1
LA AREA 1: 10.7 CM2
LA AREA 2: 13.5 CM2
LEFT ATRIUM LENGTH: 3.81 CM
LEFT ATRIUM SIZE: 3.1 CM
LEFT ATRIUM TO AORTIC ROOT RATIO: 1.07 NO UNITS
LEFT ATRIUM VOLUME INDEX: 18.5 ML/M2
LEFT ATRIUM VOLUME: 32.2 ML
LEFT VENTRICLE CARDIAC INDEX: 1.7 L/MIN/M2
LEFT VENTRICLE CARDIAC OUTPUT: 3 L/MIN
LEFT VENTRICLE DIASTOLIC VOLUME INDEX: 35.6 CM3/M2 (ref 29–61)
LEFT VENTRICLE DIASTOLIC VOLUME: 62 CM3 (ref 46–106)
LEFT VENTRICLE HEART RATE: 61 BPM
LEFT VENTRICLE MASS INDEX: 80.7 G/M2
LEFT VENTRICLE SYSTOLIC VOLUME INDEX: 15.5 CM3/M2 (ref 8–24)
LEFT VENTRICLE SYSTOLIC VOLUME: 27 CM3 (ref 14–42)
LEFT VENTRICULAR INTERNAL DIMENSION IN DIASTOLE: 5.1 CM (ref 3.8–5.2)
LEFT VENTRICULAR INTERNAL DIMENSION IN SYSTOLE: 3.5 CM (ref 2.2–3.5)
LEFT VENTRICULAR MASS: 140.5 G
LEFT VENTRICULAR OUTFLOW TRACT MEAN GRADIENT: 2 MMHG
LEFT VENTRICULAR OUTFLOW TRACT MEAN VELOCITY: 60 CM/S
LEFT VENTRICULAR POSTERIOR WALL IN END DIASTOLE: 0.8 CM (ref 0.6–0.9)
LV STROKE VOLUME INDEX: 28.2 ML/M2
MITRAL VALVE E/A RATIO: 0.8
MV AVERAGE E/E' RATIO: 9.6 CM/S
MV DECELERATION TIME: 261 MS
MV E'TISSUE VEL-LAT: 4.78 CM/S
MV E'TISSUE VEL-MED: 4.87 CM/S
MV LATERAL E/E' RATIO: 9.7
MV MEDIAL E/E' RATIO: 9.5
MV PEAK A VELOCITY: 59.2 CM/S
MV PEAK E VELOCITY: 46.4 CM/S
NUC REST DIASTOLIC VOLUME INDEX: 2368 LBS
NUC REST SYSTOLIC VOLUME INDEX: 63.75 IN
TRICUSPID REGURGITATION PEAK PRESSURE GRADIENT: 21 MMHG
TRICUSPID VALVE PEAK REGURGITANT VELOCITY: 229 CM/S

## 2020-10-13 ASSESSMENT — MIFFLIN-ST. JEOR: SCORE: 1157.35

## 2020-10-16 ENCOUNTER — COMMUNICATION - HEALTHEAST (OUTPATIENT)
Dept: CARDIOLOGY | Facility: CLINIC | Age: 70
End: 2020-10-16

## 2020-12-11 ENCOUNTER — HOSPITAL ENCOUNTER (OUTPATIENT)
Dept: MAMMOGRAPHY | Facility: CLINIC | Age: 70
Discharge: HOME | End: 2020-12-11
Attending: FAMILY MEDICINE

## 2020-12-11 DIAGNOSIS — Z12.31 VISIT FOR SCREENING MAMMOGRAM: ICD-10-CM

## 2021-01-06 ENCOUNTER — OFFICE VISIT (OUTPATIENT)
Dept: FAMILY MEDICINE | Facility: CLINIC | Age: 71
End: 2021-01-06
Payer: COMMERCIAL

## 2021-01-06 VITALS
HEART RATE: 64 BPM | WEIGHT: 134.25 LBS | DIASTOLIC BLOOD PRESSURE: 70 MMHG | BODY MASS INDEX: 22.92 KG/M2 | SYSTOLIC BLOOD PRESSURE: 112 MMHG | HEIGHT: 64 IN | TEMPERATURE: 98.1 F | RESPIRATION RATE: 12 BRPM

## 2021-01-06 DIAGNOSIS — E03.9 HYPOTHYROIDISM, UNSPECIFIED TYPE: ICD-10-CM

## 2021-01-06 DIAGNOSIS — K21.00 GASTROESOPHAGEAL REFLUX DISEASE WITH ESOPHAGITIS WITHOUT HEMORRHAGE: Primary | ICD-10-CM

## 2021-01-06 DIAGNOSIS — Z00.00 HEALTHCARE MAINTENANCE: ICD-10-CM

## 2021-01-06 DIAGNOSIS — I10 ESSENTIAL HYPERTENSION: ICD-10-CM

## 2021-01-06 DIAGNOSIS — J32.9 SINUSITIS, UNSPECIFIED CHRONICITY, UNSPECIFIED LOCATION: ICD-10-CM

## 2021-01-06 PROCEDURE — 99204 OFFICE O/P NEW MOD 45 MIN: CPT | Performed by: FAMILY MEDICINE

## 2021-01-06 RX ORDER — LEVOTHYROXINE SODIUM 25 UG/1
TABLET ORAL
COMMUNITY
Start: 2020-12-14 | End: 2021-01-06

## 2021-01-06 RX ORDER — LEVOTHYROXINE SODIUM 25 UG/1
25 TABLET ORAL DAILY
Qty: 90 TABLET | Refills: 3 | Status: SHIPPED | OUTPATIENT
Start: 2021-01-06 | End: 2021-01-13

## 2021-01-06 RX ORDER — BENZONATATE 100 MG/1
100 CAPSULE ORAL 3 TIMES DAILY PRN
Qty: 30 CAPSULE | Refills: 1 | Status: SHIPPED | OUTPATIENT
Start: 2021-01-06 | End: 2021-07-23

## 2021-01-06 RX ORDER — MULTIVITAMIN,THERAPEUTIC
1 TABLET ORAL DAILY
COMMUNITY

## 2021-01-06 RX ORDER — LOSARTAN POTASSIUM 25 MG/1
25 TABLET ORAL DAILY
COMMUNITY
Start: 2020-12-14 | End: 2021-01-06

## 2021-01-06 RX ORDER — LOSARTAN POTASSIUM 25 MG/1
25 TABLET ORAL DAILY
Qty: 90 TABLET | Refills: 3 | Status: SHIPPED | OUTPATIENT
Start: 2021-01-06 | End: 2021-10-22

## 2021-01-06 RX ORDER — TRIAMCINOLONE ACETONIDE 5 MG/G
OINTMENT TOPICAL
COMMUNITY
Start: 2019-06-19 | End: 2021-11-08

## 2021-01-06 RX ORDER — SPIRONOLACTONE 25 MG
20 TABLET ORAL DAILY
COMMUNITY

## 2021-01-06 ASSESSMENT — MIFFLIN-ST. JEOR: SCORE: 1099.98

## 2021-01-07 ENCOUNTER — RECORDS - HEALTHEAST (OUTPATIENT)
Dept: ADMINISTRATIVE | Facility: OTHER | Age: 71
End: 2021-01-07

## 2021-01-07 ENCOUNTER — RECORDS - HEALTHEAST (OUTPATIENT)
Dept: FAMILY MEDICINE | Facility: CLINIC | Age: 71
End: 2021-01-07

## 2021-01-07 DIAGNOSIS — Z00.00 HEALTHCARE MAINTENANCE: ICD-10-CM

## 2021-01-07 DIAGNOSIS — Z78.0 POST-MENOPAUSAL: ICD-10-CM

## 2021-01-08 ENCOUNTER — MYC MEDICAL ADVICE (OUTPATIENT)
Dept: FAMILY MEDICINE | Facility: CLINIC | Age: 71
End: 2021-01-08

## 2021-01-08 DIAGNOSIS — E78.5 HYPERLIPIDEMIA LDL GOAL <130: Primary | ICD-10-CM

## 2021-01-08 DIAGNOSIS — E03.9 HYPOTHYROIDISM, UNSPECIFIED TYPE: ICD-10-CM

## 2021-01-09 NOTE — PROGRESS NOTES
Assessment/Plan:    Desi Vega is a 70 year old female presenting for:    Gastroesophageal reflux disease with esophagitis without hemorrhage  Refill of omeprazole sent to the pharmacy.  - omeprazole (PRILOSEC) 20 MG DR capsule  Dispense: 90 capsule; Refill: 3    Essential hypertension  Refill of losartan sent to the pharmacy.  Blood pressure at goal today.  - losartan (COZAAR) 25 MG tablet  Dispense: 90 tablet; Refill: 3    Hypothyroidism, unspecified type  Refill of levothyroxine sent to the pharmacy.  Most recent TSH was normal this past summer  - levothyroxine (SYNTHROID/LEVOTHROID) 25 MCG tablet  Dispense: 90 tablet; Refill: 3    Sinusitis, unspecified chronicity, unspecified location  Augmentin sent to the pharmacy.  Tessalon Perles sent as well.  Discussed risks and benefits of the medication.  Patient plans to not start taking the Augmentin for another few days to see if she feels better on her own.  She does feel better having it at home.  - benzonatate (TESSALON) 100 MG capsule  Dispense: 30 capsule; Refill: 1  - amoxicillin-clavulanate (AUGMENTIN) 875-125 MG tablet  Dispense: 14 tablet; Refill: 0    Healthcare maintenance  Patient is due for a bone density scan.  She will plan on getting this done by [facility] where she has had her previous.  Discussed the importance of getting these done at the same machine.  - DX Hip/Pelvis/Spine      Medications Discontinued During This Encounter   Medication Reason     levothyroxine (SYNTHROID/LEVOTHROID) 25 MCG tablet Reorder     losartan (COZAAR) 25 MG tablet Reorder     omeprazole (PRILOSEC) 20 MG DR capsule Reorder           Chief Complaint:  Otalgia, Refill Request, and Sinus Problem        Subjective:   Desi Vega is a pleasant 70-year-old female presenting to the clinic today for concerns over ear pain and sinus pressure.    Patient notes for the last 5 days she has had increasing sinus pressure, tooth pain and now right-sided ear pain.   "She has not had any fevers.  She has not had any shortness of breath although does note that she has been coughing up phlegm.  She is certain that this is from a postnasal drainage and not anything from her lungs.    She states that she will often get a sinus infection which turns into a \"bronchitis\" and she tends to get fairly ill and lose her voice.  She does not feels though she needs antibiotics quite yet but would like to have them on hand to use in case she does not feel better in 4 to 5 days.    She is requesting Tessalon Perles as these have worked well for her in the past.    She has a history of hypertension and is on losartan.  She needs a refill today.    History of hypothyroidism with a TSH this past summer which was normal.  Like a refill of her levothyroxine.    12 point review of systems completed and negative except for what has been described above    History   Smoking Status     Never Smoker   Smokeless Tobacco     Never Used         Current Outpatient Medications:      amoxicillin-clavulanate (AUGMENTIN) 875-125 MG tablet, Take 1 tablet by mouth 2 times daily for 7 days, Disp: 14 tablet, Rfl: 0     benzonatate (TESSALON) 100 MG capsule, Take 1 capsule (100 mg) by mouth 3 times daily as needed for cough, Disp: 30 capsule, Rfl: 1     biotin 2.5 MG CAPS, Take 1 capsule by mouth, Disp: , Rfl:      levothyroxine (SYNTHROID/LEVOTHROID) 25 MCG tablet, Take 1 tablet (25 mcg) by mouth daily, Disp: 90 tablet, Rfl: 3     losartan (COZAAR) 25 MG tablet, Take 1 tablet (25 mg) by mouth daily, Disp: 90 tablet, Rfl: 3     Lutein 20 MG CAPS, Take 20 mg by mouth, Disp: , Rfl:      Multiple Vitamins-Minerals (ONCOVITE) TABS, Take 1 tablet by mouth, Disp: , Rfl:      omeprazole (PRILOSEC) 20 MG DR capsule, Take 1 capsule (20 mg) by mouth daily, Disp: 90 capsule, Rfl: 3     triamcinolone (KENALOG) 0.5 % external ointment, , Disp: , Rfl:      vitamin D3 (CHOLECALCIFEROL) 125 MCG (5000 UT) tablet, Take 5,000 Units by " "mouth, Disp: , Rfl:       Objective:  Vitals:    01/06/21 1025   BP: 112/70   Pulse: 64   Resp: 12   Temp: 98.1  F (36.7  C)   TempSrc: Tympanic   Weight: 60.9 kg (134 lb 4 oz)   Height: 1.619 m (5' 3.75\")       Body mass index is 23.23 kg/m .    Vital signs reviewed and stable  General: No acute distress  Psych: Appropriate affect  HEENT: moist mucous membranes, pupils equal, round, reactive to light and accomodation, tympanic membranes are pearly grey bilaterally  Lymph: no cervical or supraclavicular lymphadenopathy  Cardiovascular: regular rate and rhythm with no murmur  Pulmonary: clear to auscultation bilaterally with no wheeze  Abdomen: soft, non tender, non distended with normo-active bowel sounds  Extremities: warm and well perfused with no edema  Skin: warm and dry with no rash         This note has been dictated and transcribed using voice recognition software.   Any errors in transcription are unintentional and inherent to the software.  "

## 2021-01-11 ENCOUNTER — AMBULATORY - HEALTHEAST (OUTPATIENT)
Dept: CARDIOLOGY | Facility: CLINIC | Age: 71
End: 2021-01-11

## 2021-01-11 ENCOUNTER — MYC MEDICAL ADVICE (OUTPATIENT)
Dept: FAMILY MEDICINE | Facility: CLINIC | Age: 71
End: 2021-01-11

## 2021-01-11 DIAGNOSIS — I49.5 SINOATRIAL NODE DYSFUNCTION (H): ICD-10-CM

## 2021-01-11 DIAGNOSIS — Z95.0 CARDIAC PACEMAKER IN SITU: ICD-10-CM

## 2021-01-11 RX ORDER — LEVOTHYROXINE SODIUM 25 MCG
TABLET ORAL
Qty: 108 TABLET | Refills: 3 | Status: SHIPPED | OUTPATIENT
Start: 2021-01-11 | End: 2021-01-13

## 2021-01-11 RX ORDER — LOSARTAN POTASSIUM 25 MG
25 TABLET ORAL DAILY
Qty: 90 TABLET | Refills: 3 | Status: SHIPPED | OUTPATIENT
Start: 2021-01-11 | End: 2021-11-30

## 2021-01-12 ENCOUNTER — MYC MEDICAL ADVICE (OUTPATIENT)
Dept: FAMILY MEDICINE | Facility: CLINIC | Age: 71
End: 2021-01-12

## 2021-01-13 ENCOUNTER — MYC MEDICAL ADVICE (OUTPATIENT)
Dept: FAMILY MEDICINE | Facility: CLINIC | Age: 71
End: 2021-01-13

## 2021-01-13 DIAGNOSIS — E03.9 HYPOTHYROIDISM, UNSPECIFIED TYPE: ICD-10-CM

## 2021-01-13 RX ORDER — LEVOTHYROXINE SODIUM 25 UG/1
TABLET ORAL
Qty: 108 TABLET | Refills: 3 | Status: SHIPPED | OUTPATIENT
Start: 2021-01-13 | End: 2021-01-14

## 2021-01-14 ENCOUNTER — TELEPHONE (OUTPATIENT)
Dept: FAMILY MEDICINE | Facility: CLINIC | Age: 71
End: 2021-01-14

## 2021-01-14 ENCOUNTER — HEALTH MAINTENANCE LETTER (OUTPATIENT)
Age: 71
End: 2021-01-14

## 2021-01-14 DIAGNOSIS — E03.9 HYPOTHYROIDISM, UNSPECIFIED TYPE: ICD-10-CM

## 2021-01-14 RX ORDER — LEVOTHYROXINE SODIUM 25 UG/1
TABLET ORAL
Qty: 108 TABLET | Refills: 3 | Status: SHIPPED | OUTPATIENT
Start: 2021-01-14 | End: 2021-08-22

## 2021-01-14 NOTE — TELEPHONE ENCOUNTER
Resent.    It is slightly confusing because I had sent the generic form of the medication when patient was in for her physical.  She then sent me a [employer] message requesting the name brand (Synthroid) form.  This was then sent.  2 days later she sent another message requesting the generic once again.  I sent the generic again on the 13th and now I have sent it again today.    Please let me know there is anything else that I need to do    EB

## 2021-01-14 NOTE — TELEPHONE ENCOUNTER
"Reason for Call:  Other prescription    Detailed comments: [facility] requesting new script for levothyroxine.  States that the script they received states \"brand name only\".  Insurance will not cover brand name only.  Would need new script for generic form of synthroid.  Please review and resend script.  Thank you..Caitlin Saavedra      Call taken on 1/14/2021 at 11:20 AM by Caitlin Saavedra      "

## 2021-01-19 ENCOUNTER — MYC MEDICAL ADVICE (OUTPATIENT)
Dept: FAMILY MEDICINE | Facility: CLINIC | Age: 71
End: 2021-01-19

## 2021-03-24 ENCOUNTER — AMBULATORY - HEALTHEAST (OUTPATIENT)
Dept: FAMILY MEDICINE | Facility: CLINIC | Age: 71
End: 2021-03-24

## 2021-05-25 ENCOUNTER — AMBULATORY - HEALTHEAST (OUTPATIENT)
Dept: CARDIOLOGY | Facility: CLINIC | Age: 71
End: 2021-05-25

## 2021-05-25 DIAGNOSIS — I49.5 SINOATRIAL NODE DYSFUNCTION (H): ICD-10-CM

## 2021-05-25 DIAGNOSIS — Z95.0 CARDIAC PACEMAKER IN SITU: ICD-10-CM

## 2021-05-28 NOTE — TELEPHONE ENCOUNTER
----- Message from Noreen Reardon sent at 4/30/2019  9:07 AM CDT -----  Contact: patient  General phone call:    Caller: dago  Primary cardiologist: dr campbell  Detailed reason for call: pt saw on the news that losartan 25mg and 100mg are recalled. She is wondering what to do because she takes that dosage  New or active symptoms? n/a  Best phone number: [phone]  Best time to contact: any  Ok to leave a detailed message? yes  Device? yes    Additional Info:

## 2021-05-29 NOTE — PATIENT INSTRUCTIONS - HE
Ice frequently.    Tylenol extra strength 2 tablets three times a day regularly for 3-5 days.  If helping, may continue at that dose, otherwise then as needed.    Physical therapy.

## 2021-05-29 NOTE — TELEPHONE ENCOUNTER
Please call patient once refill has been authorize.   She only has 10 days worth of medications left.    Max @ [phone]

## 2021-05-29 NOTE — TELEPHONE ENCOUNTER
I called and spoke to pt. Msg below relayed to pt. No further question. I told pt that Dr. Ramachandran is on medical leave. Her option is to transfer care to [facility] or go back PCP.   Per pt said she will go back to PCP for further concerned and will called to check with us again in mid august to see if Dr. Ramachandran return yet.     Pt want a 90 days supply refill on levothyroxine 25 mcg, sent to [facility] pharmacy mail delivery. Told pt I will forward msg to PCP since Dr. Ramachandran won't be able to authorized any med. Also informed pt to f/u with PCP.

## 2021-05-29 NOTE — PROGRESS NOTES
Chief Complaint   Patient presents with     Shoulder Pain     right shoulder blade and starting to move down arm         HPI:   Desi Vega is a 69 y.o. female c/o pain in right shoulder blade . Awoke with it. Now radiating down arm.  No numbness. No decrease in strength.  Pain with moving hand.  Sometimes sharp pain, always dull ache.  Has used tylenol without help.  Has used heat and ice.  Ice seems to help some.  Awakens her at night.  No known injury.  No change in activities.  No previous trouble like this.  No previous injury to arm.    Requests refill of triamcinolone .05%  for lichen simplex     ROS:  A 10 point comprehensive review of systems was negative except as noted.     Medications:  Current Outpatient Medications on File Prior to Visit   Medication Sig Dispense Refill     amoxicillin (AMOXIL) 500 MG capsule Predental       ascorbic acid, vitamin C, (VITAMIN C) 100 MG tablet Take 100 mg by mouth daily.       biotin 2,500 mcg cap Take 1 capsule by mouth daily.        cholecalciferol, vitamin D3, 5,000 unit Tab Take 5,000 Units by mouth every morning.        clopidogrel (PLAVIX) 75 mg tablet Take 1 tablet (75 mg total) by mouth daily. 90 tablet 3     levothyroxine (SYNTHROID, LEVOTHROID) 25 MCG tablet TAKE 1 TABLET DAILY EXCEPT TAKE 2 TABLETS ON MONDAY AND FRIDAY 117 tablet 0     losartan (COZAAR) 25 MG tablet TAKE 1 TABLET EVERY DAY 90 tablet 1     lutein 20 mg cap Take 20 mg by mouth every morning.        multivit,stress formula-zinc (B COMPLEX-C-E-ZINC) Tab tablet Take 1 tablet on Monday, Friday 30 tablet 0     multivitamin therapeutic (THERAGRAN) tablet Take 1 tablet by mouth daily.       zolpidem (AMBIEN) 5 MG tablet TAKE 1 TABLET AT BEDTIME AS NEEDED FOR SLEEP 90 tablet 1     No current facility-administered medications on file prior to visit.          Social History:  Social History     Tobacco Use     Smoking status: Never Smoker     Smokeless tobacco: Never Used   Substance Use  Topics     Alcohol use: Yes     Comment: rare         Physical Exam:   Vitals:    06/19/19 1019   BP: 134/82   Pulse: 64   Resp: 16   Temp: 97.6  F (36.4  C)   TempSrc: Oral   Weight: 156 lb (70.8 kg)       GEN:  NAD  LUNGS:  Clear to auscultation without wheezing.  Normal effort.  HEART:  RRR without murmur, rub or gallop   MS:  Tender over right rhomboids. Spasm over infraspinatous muscle.  No tenderness over deltoid or biceps.  NECK:  No tenderness to percussion over spine.               No tenderness over paracervical muscles.               FROM   NEURO:  DTR's:  Triceps: 2/4  BL                                Bicips:  2/4  BL                               Brachioradialis:  2/4  BL                  MOTOR:  Finger Abduction/Adduction: 5/5  BL                                  Thumb Pincer:  5/5  BL                                   Wrist Flexion/Extension:  5/5  BL                                  Elbow Flexion/Extension:  5/5  BL                                  Shoulder Abduction/Adduction 5/5 BL                 Sensation intact to light touch BL         Assessment/Plan:    1. Upper back pain on right side  Ambulatory referral to PT/OT   2. Acute pain of right shoulder  Ambulatory referral to PT/OT   3. Lichen sclerosus  triamcinolone (KENALOG) 0.5 % ointment      Tylenol 1000 mg three times a day-four times a day   Ice area frequently.  Referred for physical therapy.    She requests refills of current medications unrelated to today's medical evaluation. These medications and related lab tests were reviewed with her and refilled.            Zaira Saenz MD      6/19/2019    The following portions of the patient's history were reviewed and updated as appropriate: allergies, current medications, past family history, past medical history, past social history, past surgical history and problem list.

## 2021-05-29 NOTE — TELEPHONE ENCOUNTER
----- Message from Maria Victoria Mancuso NP sent at 6/4/2019  3:57 PM CDT -----  All labs were within normal range, no changes necessary

## 2021-05-29 NOTE — TELEPHONE ENCOUNTER
Please refill: Levothyroxine 25 MCG     Supply as 30 day or 90 days? 90 day supply w/ 1 refill    Please send RX to: [facility]    Is patient out of medication? Not yet    - How much meds. Left? Less than 30 days worth    Does patient want a call back when completed? No    - Okay to leave a detailed message? Sure

## 2021-05-30 VITALS — HEIGHT: 65 IN | BODY MASS INDEX: 26.66 KG/M2 | WEIGHT: 160 LBS

## 2021-05-30 VITALS — BODY MASS INDEX: 27.66 KG/M2 | HEIGHT: 64 IN | WEIGHT: 162 LBS

## 2021-05-30 NOTE — PROGRESS NOTES
Assessment/Plan:    Desi Vega is a 69 y.o. female presenting for:    1. Acute pain of right shoulder  I have personally reviewed this x-ray and find it to be negative for any fracture, dislocation or bone lesions.  We will have radiology read as well.  We discussed that this is almost certainly muscular.  Referral to physical therapy again was placed.  She would like to go to [facility] here in [place].  She might follow-up with orthopedics and she has their phone number if she would like.  I did send a small course of Flexeril to the pharmacy.  We discussed that this will likely make her drowsy and she is aware.  She will only take this at night.  Discussed gentle stretching and heat.  - XR Shoulder Right 2 or More VWS; Future  - cyclobenzaprine (FLEXERIL) 10 MG tablet; Take 0.5 tablets (5 mg total) by mouth every 8 (eight) hours as needed for muscle spasms.  Dispense: 30 tablet; Refill: 1  - Ambulatory referral to PT/OT        There are no discontinued medications.        Chief Complaint:  Chief Complaint   Patient presents with     Shoulder Pain     right side       Subjective:   Desi Vega is a pleasant 69-year-old female with a past medical history significant for anxiety and insomnia who presents to the clinic today with concerns over right-sided shoulder pain.  The patient notes that she was in clinic about a month ago for this as well.  This started about 6 weeks ago.  She began to have pain in her right upper back.  She was told that this was likely a muscle spasm and was sent to physical therapy.  She made an appointment but by the time her appointment came the pain had improved so she was able to cancel.  She notes that now it has resurfaced.  She states that this is worse in the morning and her shoulder feels very achy with some sharp pain sometimes.  The pain is over her scapula on the right.  Tender to the touch.  She is concerned because 1 of her  "friends was recently diagnosed with bone cancer and this started as bone pain.    The patient states that she has good range of motion in her shoulder.  No fevers or chills.  No trauma that she is aware of.    She is otherwise doing well.  Her endocrinologist had started her on a low-dose of levothyroxine.  He is on medical leave and she will be making appointment with me to follow-up with this.    12 point review of systems completed and negative except for what has been described above    Social History     Tobacco Use   Smoking Status Never Smoker   Smokeless Tobacco Never Used       Current Outpatient Medications   Medication Sig     amoxicillin (AMOXIL) 500 MG capsule Predental     ascorbic acid, vitamin C, (VITAMIN C) 100 MG tablet Take 100 mg by mouth daily.     biotin 2,500 mcg cap Take 1 capsule by mouth daily.      cholecalciferol, vitamin D3, 5,000 unit Tab Take 5,000 Units by mouth every morning.      clopidogrel (PLAVIX) 75 mg tablet Take 1 tablet (75 mg total) by mouth daily.     levothyroxine (SYNTHROID, LEVOTHROID) 25 MCG tablet TAKE 1 TABLET DAILY EXCEPT TAKE 2 TABLETS ON MONDAY AND FRIDAY     losartan (COZAAR) 25 MG tablet TAKE 1 TABLET EVERY DAY     lutein 20 mg cap Take 20 mg by mouth every morning.      multivit,stress formula-zinc (B COMPLEX-C-E-ZINC) Tab tablet Take 1 tablet on Monday, Friday     multivitamin therapeutic (THERAGRAN) tablet Take 1 tablet by mouth daily.     triamcinolone (KENALOG) 0.5 % ointment Apply topically 2 (two) times a day.     zolpidem (AMBIEN) 5 MG tablet TAKE 1 TABLET AT BEDTIME AS NEEDED FOR SLEEP     cyclobenzaprine (FLEXERIL) 10 MG tablet Take 0.5 tablets (5 mg total) by mouth every 8 (eight) hours as needed for muscle spasms.         Objective:  Vitals:    07/24/19 0833   BP: 122/80   Pulse: 76   Resp: 16   Temp: 97.5  F (36.4  C)   TempSrc: Oral   Weight: 157 lb (71.2 kg)   Height: 5' 3.75\" (1.619 m)       Body mass index is 27.16 kg/m .    Vital signs reviewed " and stable  General: No acute distress  Psych: Appropriate affect  HEENT: moist mucous membranes  Cardiovascular: regular rate and rhythm with no murmur  Pulmonary: clear to auscultation bilaterally with no wheeze  Abdomen: soft, non tender, non distended with normo-active bowel sounds  Extremities: warm and well perfused with no edema  Skin: warm and dry with no rash  Musculoskeletal: Patient does have tenderness palpation over her right scapula somewhat in the middle aspect.  Good range of motion in right shoulder.       This note has been dictated and transcribed using voice recognition software.   Any errors in transcription are unintentional and inherent to the software.

## 2021-05-31 VITALS — WEIGHT: 159 LBS | BODY MASS INDEX: 27.14 KG/M2 | HEIGHT: 64 IN

## 2021-05-31 VITALS — BODY MASS INDEX: 28.34 KG/M2 | HEIGHT: 64 IN | WEIGHT: 166 LBS

## 2021-05-31 VITALS — BODY MASS INDEX: 28.57 KG/M2 | HEIGHT: 64 IN | WEIGHT: 167.38 LBS

## 2021-05-31 VITALS — BODY MASS INDEX: 28.68 KG/M2 | WEIGHT: 168 LBS | HEIGHT: 64 IN

## 2021-05-31 VITALS — HEIGHT: 65 IN | BODY MASS INDEX: 27.88 KG/M2 | WEIGHT: 167.3 LBS

## 2021-05-31 VITALS — HEIGHT: 64 IN | BODY MASS INDEX: 27.49 KG/M2 | WEIGHT: 161 LBS

## 2021-05-31 NOTE — PROGRESS NOTES
In clinic device check with Device RN followed by office visit with Dr. Orr.  Please see link for full device report.  Patient was informed of results and next follow up during today's visit.

## 2021-05-31 NOTE — PROGRESS NOTES
[facility] Progress Note    Assessment:  1.  Sick sinus syndrome.  Pacemaker interrogation today demonstrates stable pacemaker function.  He is pacing 80.6% of the time in the atrium.    2.  History of dyspnea on exertion.  This sounds stable to improved.  She does have some dyspnea while climbing stairs.  She will monitor for any progression of symptoms.  She had an echocardiogram in 2016 that suggested mild reduction in left ventricular function and we are going to plan follow-up echocardiogram.    3.  Hypertension.  She is on losartan.  Blood pressure appears to be under good control based on recent blood pressure readings including here in the office today.  I renewed the losartan.    4.  History of transient global amnesia.  This is apparently diagnosed in [place] many years ago and she is been on Plavix since that time.  I have corresponded with Dr. Watt about whether would be any benefit from neurologic assessment given that she has been maintained on Plavix in the interim.      Plan: As outlined above with follow-up in 1 year    1. LV dysfunction  Echo Complete   2. Essential hypertension with goal blood pressure less than 140/90  losartan (COZAAR) 25 MG tablet   3. Cardiac pacemaker in situ, dual chamber     4. Sick Sinus Syndrome           An After Visit Summary was printed and given to the patient.    Subjective:    Desi Vega is a 69 y.o. female who returned for a planned  follow up visit.  She is here for her pacemaker interrogation as well as follow-up as a relates to her cardiac status.  Pacemaker interrogation today demonstrates normal pacemaker function with pacing in the atrium 80.6% of the time and no significant pacing the ventricle and no significant rhythm abnormality.  She reports feeling well.  She has some chronic mild shortness of breath with exertion and this is unchanged.  She reports that blood pressures been under good control.  She denies chest pain,  orthopnea or PND.  She had a stress echocardiogram September 2016 where ejection fraction was said to be normal but the prior echocardiogram March 2016 reported ejection fraction of 45 to 50%.  Is a history of pulmonary embolism in 2015 after knee replacement surgery.  She has been on chronic Plavix apparently her diagnosis of transient global amnesia a number of years ago in [place].  She had a CT coronary angiography 2017 that was reported negative for significant obstructive coronary artery disease.  She was started on Synthroid by her endocrinologist secondary to hair loss.  I reviewed the notes from that visit.    Review of Systems:   General: WNL  Eyes: WNL  Ears/Nose/Throat: WNL  Lungs: WNL  Heart: WNL  Stomach: WNL  Bladder: WNL  Muscle/Joints: WNL  Skin: WNL  Nervous System: WNL  Mental Health: WNL     Blood: WNL      Problem List:    Patient Active Problem List   Diagnosis     Symptomatic Menopause     Osteopenia     Ganglion Of The Left Foot     Sick Sinus Syndrome     Insomnia     Backache     S/P total knee arthroplasty     TGA (transient global amnesia)     H/O gastric bypass     Cardiac pacemaker in situ, dual chamber     Pulmonary embolism (H)     Hypertension     Hair loss       Social History     Socioeconomic History     Marital status:      Spouse name: Not on file     Number of children: Not on file     Years of education: Not on file     Highest education level: Not on file   Occupational History     Not on file   Social Needs     Financial resource strain: Not on file     Food insecurity:     Worry: Not on file     Inability: Not on file     Transportation needs:     Medical: Not on file     Non-medical: Not on file   Tobacco Use     Smoking status: Never Smoker     Smokeless tobacco: Never Used   Substance and Sexual Activity     Alcohol use: Yes     Comment: rare     Drug use: No     Sexual activity: Not on file   Lifestyle     Physical activity:     Days per week: Not on file      Minutes per session: Not on file     Stress: Not on file   Relationships     Social connections:     Talks on phone: Not on file     Gets together: Not on file     Attends Spiritism service: Not on file     Active member of club or organization: Not on file     Attends meetings of clubs or organizations: Not on file     Relationship status: Not on file     Intimate partner violence:     Fear of current or ex partner: Not on file     Emotionally abused: Not on file     Physically abused: Not on file     Forced sexual activity: Not on file   Other Topics Concern     Not on file   Social History Narrative     Not on file       Family History   Problem Relation Age of Onset     Heart attack Father      Diabetes Maternal Grandmother      Diabetes Brother        Current Outpatient Medications   Medication Sig Dispense Refill     amoxicillin (AMOXIL) 500 MG capsule Predental       ascorbic acid, vitamin C, (VITAMIN C) 100 MG tablet Take 100 mg by mouth daily.       biotin 2,500 mcg cap Take 1 capsule by mouth daily.        cholecalciferol, vitamin D3, 5,000 unit Tab Take 5,000 Units by mouth every morning.        clopidogrel (PLAVIX) 75 mg tablet Take 1 tablet (75 mg total) by mouth daily. 90 tablet 3     cyclobenzaprine (FLEXERIL) 10 MG tablet Take 0.5 tablets (5 mg total) by mouth every 8 (eight) hours as needed for muscle spasms. 30 tablet 1     levothyroxine (SYNTHROID, LEVOTHROID) 25 MCG tablet TAKE 1 TABLET DAILY EXCEPT TAKE 2 TABLETS ON MONDAY AND FRIDAY 117 tablet 0     losartan (COZAAR) 25 MG tablet Take 1 tablet (25 mg total) by mouth daily. 90 tablet 4     lutein 20 mg cap Take 20 mg by mouth every morning.        multivit,stress formula-zinc (B COMPLEX-C-E-ZINC) Tab tablet Take 1 tablet on Monday, Friday 30 tablet 0     multivitamin therapeutic (THERAGRAN) tablet Take 1 tablet by mouth daily.       polymyxin B-trimethoprim (FOR POLYTRIM) 10,000 unit- 1 mg/mL Drop ophthalmic drops 1-2 drops to the affected  "eye(s) 4 (four) times a day for 7 days 1 Bottle 0     triamcinolone (KENALOG) 0.5 % ointment Apply topically 2 (two) times a day. 30 g 3     zolpidem (AMBIEN) 5 MG tablet TAKE 1 TABLET AT BEDTIME AS NEEDED FOR SLEEP 90 tablet 1     No current facility-administered medications for this visit.        Objective:     /72 (Patient Site: Left Arm, Patient Position: Sitting, Cuff Size: Adult Regular)   Pulse 76   Resp 16   Ht 5' 3.75\" (1.619 m)   Wt 152 lb (68.9 kg)   LMP 07/29/1988   BMI 26.30 kg/m    152 lb (68.9 kg)   153 pounds August 2018    Physical Exam:    GENERAL APPEARANCE: alert, no apparent distress  HEENT: no scleral icterus or xanthelasma  NECK: jugular venous pressure within normal limits  CHEST: symmetric, the lungs are clear to auscultation, pacemaker site well-healed  CARDIOVASCULAR: regular rhythm without murmur, click, or gallop; no carotid bruits  Abdomen: No Organomegaly, masses, bruits, or tenderness. Bowels sounds are present      EXTREMITIES: no cyanosis, clubbing or edema    Cardiac Testing:  Cardiac Testing:  Procedure Date  Date: 03/21/2016 Start: 11:04 AM     Study Location: [facility]  Technical Quality: Adequate visualization     Patient Status: Routine     Contrast Medium: Definity. Used - 2 mland Wasted - 8 ml     Height: 64 inches Weight: 165 pounds BSA: 1.8 m^2 BMI: 28.32 kg/m^2     BP: 116/80 mmHg     Indications  Cardiomyopathy.      Conclusions       Summary   Definity contrast was used.   Normal left ventricular size and lower limits of normal to mildly reduced   systolic function.   Left ventricular ejection fraction is visually estimated to be 45-50%.   Pacemaker noted in the RA and RV   No significant valvular abnormalities.   Pleural effusion is noted.   Compared to 11/2015 no significant change is observed   CTA Coronary   Order# [id number]   Reading physician: Carolin Hagen MD Ordering physician: Orlin Wells MD Study date: 4/11/17   Patient Information "     Patient Name  Desi Vega MRN  [id number] Sex  Female              Age  1950 (69 y.o.)   Indications     Dyspnea, cardiac origin suspected   Dx: Other nonspecific abnormal cardiovascular system function study [R94.39 (ICD-10-CM)]   Interpretation Summary       Normal coronary anatomy with no significant coronary artery disease detected in this study.      Technical Details     TECHNIQUE: Retrospective ECG gated CT scanning of the heart with intravenous contrast was performed on a Siemens Definition Flash CT scanner using 95 mL Omnipaque 350. Spiral protocol for coronary CT angiography was performed after administering 0.4mg of sublingual nitroglycerin. 76. Pulse range 50% - 70% of the cardiac phase. The imaging protocol was individualized to minimize radiation exposure. Image post processing was performed on a [employer] workstation. This study was performed after discussion of the goals, risk benefits and alternatives of the procedure. Risks discussed included the risk of contrast injection and diagnostic x-ray exposure. The best reconstructions were obtained at 64% of the diastolic phase and 50% of the systolic phase.   Noncoronary Findings     Left Ventricle No left ventricular mass or thrombus.   Right Ventricle Pacer wire present in the ventricle.   Left Atrium No left atrial mass or thrombus.   Aorta Normal caliber of the visualized proximal ascending aorta. Normal sized aortic root.   Aortic Valve Tricuspid aortic valve.   Pulmonary Artery Normal pulmonary artery and venous anatomy. All pulmonary veins draining into the left atrium.   Pericardium Normal pericardial thickness.No pericardial effusion.   Coronary     Diagnostic   Dominance: Right   Left Main   The vessel and its major branches are widely patent without any detectable stenosis or plaque.   Left Anterior Descending   The vessel and its major branches are widely patent without any detectable stenosis or plaque.   Left  Circumflex   The vessel and its major branches are widely patent without any detectable stenosis or plaque.   Right Coronary Artery   The vessel and its major branches are widely patent without any detectable stenosis or plaque.   Intervention     No interventions have been documented.   Complications     There were no immediate complications during the procedure.     Procedure     Type of Study      TTE procedure:ECHO COMPLETE.     Procedure Date  Date: 03/21/2016 Start: 11:04 AM     Study Location: [facility]  Technical Quality: Adequate visualization     Patient Status: Routine     Contrast Medium: Definity. Used - 2 mland Wasted - 8 ml     Height: 64 inches Weight: 165 pounds BSA: 1.8 m^2 BMI: 28.32 kg/m^2     BP: 116/80 mmHg     Indications  Cardiomyopathy.      Conclusions      Summary   Definity contrast was used.   Normal left ventricular size and lower limits of normal to mildly reduced   systolic function.   Left ventricular ejection fraction is visually estimated to be 45-50%.   Pacemaker noted in the RA and RV   No significant valvular abnormalities.   Pleural effusion is noted.   Compared to 11/2015 no significant change is observed      Lab Results:    Lab Results   Component Value Date     09/10/2018    K 4.1 06/03/2019     09/10/2018    CO2 28 09/10/2018    BUN 17 09/10/2018    CREATININE 0.82 06/03/2019    CALCIUM 9.2 06/03/2019     Lab Results   Component Value Date    CHOL 195 09/10/2018    TRIG 92 09/10/2018    HDL 73 09/10/2018     BNP (pg/mL)   Date Value   01/20/2016 13   11/18/2015 48   09/22/2015 259 (H)     Creatinine (mg/dL)   Date Value   06/03/2019 0.82   09/10/2018 0.65   06/04/2018 0.74   07/17/2017 0.79     LDL Calculated (mg/dL)   Date Value   09/10/2018 104   07/17/2017 111   03/18/2016 104     Lab Results   Component Value Date    WBC 4.8 09/10/2018    WBC 7.4 09/23/2015    HGB 14.6 09/10/2018    HCT 43.4 09/10/2018    MCV 97 09/10/2018     09/10/2018                This note has been dictated using voice recognition software. Any grammatical or context distortions are unintentional and inherent to the software.      Orlin Wells M.D., F.A.C.C.  [facility]  [phone]

## 2021-05-31 NOTE — TELEPHONE ENCOUNTER
Need lab orders for 09/19 am - pt is coming for lab only apt and wants to get his thyroid test done.  Please put lab orders in.  Thx

## 2021-05-31 NOTE — PROGRESS NOTES
Can you please contact this patient and let her know that I placed a referral to [facility].  Her and her cardiologist discussed this today.  She was seen by adolfo in the summer 2015    Please let me know if she has any questions.    IVON

## 2021-05-31 NOTE — TELEPHONE ENCOUNTER
Patient requesting new pharmacy ([facility]) with 3 additional refills.        Refill Request  Did you contact pharmacy: Yes  Medication name:   Requested Prescriptions     Pending Prescriptions Disp Refills     levothyroxine (SYNTHROID, LEVOTHROID) 25 MCG tablet 117 tablet 0     Sig: TAKE 1 TABLET DAILY EXCEPT TAKE 2 TABLETS ON MONDAY AND FRIDAY     Who prescribed the medication: Roz Acevedo MD   Pharmacy Name and Location: [facility] #[id number]  Is patient out of medication: No.  14 days left  Patient notified refills processed in 72 hours:  yes  Okay to leave a detailed message: yes, use [phone]

## 2021-05-31 NOTE — PATIENT INSTRUCTIONS - HE
We are going to plan a heart ultrasound to follow up on heart function.Please call my nurse Evette with any heart related symptoms.Her number is [phone] Neurologist to consider include Dr Peralta,Dr Davis are dr Kyle Gould.

## 2021-05-31 NOTE — PROGRESS NOTES
Assessment/ Plan     1. Acute conjunctivitis of left eye, unspecified acute conjunctivitis type  She appears to have a 1 day history of left eye conjunctivitis.  As its been quite crusty, recommend starting antibiotic drops.  We will have her continue these until she is been clear for 2 days.  Recommend cleaning linens and good handwashing.  Follow-up if symptoms are not improving over the next week or so.  - polymyxin B-trimethoprim (FOR POLYTRIM) 10,000 unit- 1 mg/mL Drop ophthalmic drops; 1-2 drops to the affected eye(s) 4 (four) times a day for 7 days  Dispense: 1 Bottle; Refill: 0      Subjective:       Desi Vega is a 69 y.o. female who presents for evaluation of a left right eye.  She states yesterday that I felt very gritty and she thought maybe she had an eyelash in it.  She was rubbing it quite vigorously and then it felt better.  It was not bothering her when she went to bed last night.  However, when she woke up this morning the lashes were crusted shut.  She did use warm cloth to open things up.  It just feels kind of cloudy at this point, she is not having any pain.  She has no exposures that she can think of.  She has not been sick recently with a viral URI.  She has had no change in vision.    Relevant past medical, family, surgical, and social history reviewed with patient, unless noted in HPI, not pertinent for this visit.  Medications were discussed and reconciled.   Review of Systems   A 12 point comprehensive review of systems was negative except as noted.      Current Outpatient Medications   Medication Sig Dispense Refill     amoxicillin (AMOXIL) 500 MG capsule Predental       ascorbic acid, vitamin C, (VITAMIN C) 100 MG tablet Take 100 mg by mouth daily.       biotin 2,500 mcg cap Take 1 capsule by mouth daily.        cholecalciferol, vitamin D3, 5,000 unit Tab Take 5,000 Units by mouth every morning.        clopidogrel (PLAVIX) 75 mg tablet Take 1 tablet (75 mg total) by mouth  "daily. 90 tablet 3     cyclobenzaprine (FLEXERIL) 10 MG tablet Take 0.5 tablets (5 mg total) by mouth every 8 (eight) hours as needed for muscle spasms. 30 tablet 1     levothyroxine (SYNTHROID, LEVOTHROID) 25 MCG tablet TAKE 1 TABLET DAILY EXCEPT TAKE 2 TABLETS ON MONDAY AND FRIDAY 117 tablet 0     losartan (COZAAR) 25 MG tablet TAKE 1 TABLET EVERY DAY 90 tablet 1     lutein 20 mg cap Take 20 mg by mouth every morning.        multivit,stress formula-zinc (B COMPLEX-C-E-ZINC) Tab tablet Take 1 tablet on Monday, Friday 30 tablet 0     multivitamin therapeutic (THERAGRAN) tablet Take 1 tablet by mouth daily.       triamcinolone (KENALOG) 0.5 % ointment Apply topically 2 (two) times a day. 30 g 3     zolpidem (AMBIEN) 5 MG tablet TAKE 1 TABLET AT BEDTIME AS NEEDED FOR SLEEP 90 tablet 1     polymyxin B-trimethoprim (FOR POLYTRIM) 10,000 unit- 1 mg/mL Drop ophthalmic drops 1-2 drops to the affected eye(s) 4 (four) times a day for 7 days 1 Bottle 0     No current facility-administered medications for this visit.        Objective:      /70   Pulse 62   Temp 97.9  F (36.6  C) (Oral)   Resp 12   Ht 5' 3.75\" (1.619 m)   Wt 153 lb (69.4 kg)   LMP 07/29/1988   BMI 26.47 kg/m        General appearance: alert, appears stated age and cooperative  Head: Normocephalic, without obvious abnormality, atraumatic  Eyes:  PERRL, EOM's intact.  Left conjunctiva is erythematous, right is clear  Ears: normal TM's and external ear canals both ears  Neck: no adenopathy      No results found for this or any previous visit (from the past 168 hour(s)).       This note has been dictated using voice recognition software. Any grammatical or context distortions are unintentional and inherent to the software  "

## 2021-06-01 VITALS — WEIGHT: 157.13 LBS | HEIGHT: 64 IN | BODY MASS INDEX: 26.82 KG/M2

## 2021-06-01 VITALS — BODY MASS INDEX: 28.85 KG/M2 | HEIGHT: 64 IN | WEIGHT: 169 LBS

## 2021-06-01 VITALS — HEIGHT: 64 IN | BODY MASS INDEX: 26.12 KG/M2 | WEIGHT: 153 LBS

## 2021-06-01 VITALS — HEIGHT: 64 IN | BODY MASS INDEX: 27.11 KG/M2 | WEIGHT: 158.8 LBS

## 2021-06-01 VITALS — BODY MASS INDEX: 28.51 KG/M2 | HEIGHT: 64 IN | WEIGHT: 167 LBS

## 2021-06-01 VITALS — BODY MASS INDEX: 28.39 KG/M2 | WEIGHT: 166.31 LBS | HEIGHT: 64 IN

## 2021-06-01 VITALS — BODY MASS INDEX: 27.14 KG/M2 | HEIGHT: 64 IN | WEIGHT: 159 LBS

## 2021-06-01 NOTE — PROGRESS NOTES
Assessment/Plan:    Desi Vega is a 69 y.o. female presenting for:    1. Routine general medical examination at a health care facility  Patient will receive her influenza as well as her Shingrix vaccine today.  She states that she called her insurance regarding her shingles vaccine and it should be mostly covered.    Discussed returning for a repeat vaccine in 2 to 6 months.    I did encourage her to see the audiologist at her earliest convenience to discuss hearing aids if she continues to have issues.  - Varicella Zoster, Recombinant Vaccine IM    2. TGA (transient global amnesia)  She will be seeing the neurologist to see if she can get off of the Plavix which she is taking.  This episode was about 10 to 12 years ago.    3. Hair loss  She is on low-dose levothyroxine.  TSH is within the normal range and she is not having any hyperthyroid type of symptoms.  She will continue her current dose.  Refill sent to the pharmacy.        There are no discontinued medications.        Chief Complaint:  Chief Complaint   Patient presents with     Medication Education Visit     Med check     Thyroid Problem     Thyroid check     Flu Vaccine       Subjective:   Desi Vega is a pleasant 69-year-old female presenting to the clinic today for several concerns:    1.  Healthcare maintenance: Patient is due for her mammogram.  She has a scheduled in October.  She also would like to discuss her hearing and ensure that she does not have any wax in her ears.  She feels as though her hearing is decreased.  She does not need a referral to an audiologist but just wants to make sure that there is no occlusion.  She also would like to get her shingles and influenza vaccine today.    2.  Transient global amnesia: Patient had 1 day of transient global amnesia about 10 years ago.  Since that time she is been on Plavix.  She saw a neurologist back in 2015 who told her to continue her Plavix.  Her cardiologist Dr. Wells would  like her off of the Plavix and recommended that she see a different neurologist for second opinion.  She has that coming up in October as well.    3.  Hair loss: Patient has been complaining of hair loss, general fatigue, skin changes and was seen by the endocrinologist.  She was started on low-dose levothyroxine which she is tolerating well.  She takes 25 mg daily but 2 days weekly she takes 50 mg daily.  She has no palpitations or hot flashes.  No diarrhea.  Her endocrinologist is now on medical leave and she is hopeful that I can fill this prescription for her.    12 point review of systems completed and negative except for what has been described above    Social History     Tobacco Use   Smoking Status Never Smoker   Smokeless Tobacco Never Used       Current Outpatient Medications   Medication Sig     amoxicillin (AMOXIL) 500 MG capsule Predental     ascorbic acid, vitamin C, (VITAMIN C) 100 MG tablet Take 100 mg by mouth daily.     biotin 2,500 mcg cap Take 1 capsule by mouth daily.      cholecalciferol, vitamin D3, 5,000 unit Tab Take 5,000 Units by mouth every morning.      clopidogrel (PLAVIX) 75 mg tablet Take 1 tablet (75 mg total) by mouth daily.     cyclobenzaprine (FLEXERIL) 10 MG tablet Take 0.5 tablets (5 mg total) by mouth every 8 (eight) hours as needed for muscle spasms.     levothyroxine (SYNTHROID, LEVOTHROID) 25 MCG tablet TAKE 1 TABLET DAILY EXCEPT TAKE 2 TABLETS ON MONDAY AND FRIDAY     losartan (COZAAR) 25 MG tablet Take 1 tablet (25 mg total) by mouth daily.     lutein 20 mg cap Take 20 mg by mouth every morning.      multivit,stress formula-zinc (B COMPLEX-C-E-ZINC) Tab tablet Take 1 tablet on Monday, Friday     multivitamin therapeutic (THERAGRAN) tablet Take 1 tablet by mouth daily.     polymyxin B-trimethoprim (FOR POLYTRIM) 10,000 unit- 1 mg/mL Drop ophthalmic drops 1-2 drops to the affected eye(s) 4 (four) times a day for 7 days     triamcinolone (KENALOG) 0.5 % ointment Apply  "topically 2 (two) times a day.     zolpidem (AMBIEN) 5 MG tablet TAKE 1 TABLET AT BEDTIME AS NEEDED FOR SLEEP         Objective:  Vitals:    09/20/19 1120   BP: 100/58   Pulse: 84   Resp: 16   Temp: 97.9  F (36.6  C)   TempSrc: Oral   Weight: 152 lb 3 oz (69 kg)   Height: 5' 3.75\" (1.619 m)       Body mass index is 26.33 kg/m .    Vital signs reviewed and stable  General: No acute distress  Psych: Appropriate affect  HEENT: moist mucous membranes, pupils equal, round, reactive to light and accomodation, posterior oropharynx clear of erythema or exudate, tympanic membranes are pearly grey bilaterally  Lymph: no cervical or supraclavicular lymphadenopathy  Cardiovascular: regular rate and rhythm with no murmur  Pulmonary: clear to auscultation bilaterally with no wheeze  Abdomen: soft, non tender, non distended with normo-active bowel sounds  Extremities: warm and well perfused with no edema  Skin: warm and dry with no rash         This note has been dictated and transcribed using voice recognition software.   Any errors in transcription are unintentional and inherent to the software.  "

## 2021-06-01 NOTE — TELEPHONE ENCOUNTER
Rx sent again, informed pt rx was sent 9/3/19.  Pt will call back if pharm still doesn't receive.  -stephanie

## 2021-06-01 NOTE — TELEPHONE ENCOUNTER
Please fax prescription it to [facility] .  Medication Request  Medication name:   levothyroxine (SYNTHROID, LEVOTHROID) 25 MCG tablet 117 tablet 3 9/6/2019     Sig: TAKE 1 TABLET DAILY EXCEPT TAKE 2 TABLETS ON MONDAY AND FRIDAY    Pharmacy Name and Location: [facility]   Reason for request: Prescription was sent to wrong pharmacy . Please fax it to [facility] .  When did you use medication last?:  Unknown   Patient offered appointment:  N/A  Okay to leave a detailed message: no

## 2021-06-01 NOTE — TELEPHONE ENCOUNTER
----- Message from Adriana Leon sent at 9/6/2019  8:51 AM CDT -----  General phone call:    Caller: Pt  Primary cardiologist: Russell  Detailed reason for call:   New or active symptoms? Pt states she is trying to get her Losartin (90 days, 3 refills) script changed from mail order to [facility] on [address]- Christine hasn't heard back from us  Best phone number: Pt: [phone]  Best time to contact:   Ok to leave a detailed message?   Device? Yes    Additional Info:

## 2021-06-01 NOTE — TELEPHONE ENCOUNTER
Medication Question or Clarification  Who is calling: Patient  What medication are you calling about? (include dose and sig)   levothyroxine (SYNTHROID, LEVOTHROID) 25 MCG tablet 40 tablet 0 9/4/2019     Sig: TAKE 1 TABLET DAILY EXCEPT TAKE 2 TABLETS ON MONDAY AND FRIDAY    Sent to pharmacy as: levothyroxine (SYNTHROID, LEVOTHROID) 25 MCG tablet    Cosign for Ordering: Accepted by Maria Victoria Mancuso NP on 9/4/2019  8:29 AM      Who prescribed the medication?:   Maria Victoria Mancuso NP  What is your question/concern?:   Patient is requesting a 90 day supply, 117 Tablets with 3 refills be sent to her pharmacy.  Patient has appointment on 9/20/19 with Provider for thyroid check.  Pharmacy:   [facility] #[id number]  Okay to leave a detailed message?: Yes  Site CMT - Please call the pharmacy to obtain any additional needed information.

## 2021-06-01 NOTE — TELEPHONE ENCOUNTER
Previous refill request has rx requested to go to [facility].  Pt is now requesting refill be sent to [facility].  Rx queued for MD approval.

## 2021-06-02 VITALS — HEIGHT: 64 IN | WEIGHT: 157 LBS | BODY MASS INDEX: 26.8 KG/M2

## 2021-06-02 VITALS — HEIGHT: 64 IN | WEIGHT: 153 LBS | BODY MASS INDEX: 26.12 KG/M2

## 2021-06-02 VITALS — BODY MASS INDEX: 25.83 KG/M2 | WEIGHT: 151.31 LBS | HEIGHT: 64 IN

## 2021-06-03 VITALS — HEIGHT: 64 IN | BODY MASS INDEX: 25.95 KG/M2 | WEIGHT: 152 LBS

## 2021-06-03 VITALS — WEIGHT: 157 LBS | HEIGHT: 64 IN | BODY MASS INDEX: 26.8 KG/M2

## 2021-06-03 VITALS
TEMPERATURE: 97.9 F | HEIGHT: 64 IN | RESPIRATION RATE: 16 BRPM | SYSTOLIC BLOOD PRESSURE: 100 MMHG | HEART RATE: 84 BPM | DIASTOLIC BLOOD PRESSURE: 58 MMHG | BODY MASS INDEX: 25.98 KG/M2 | WEIGHT: 152.19 LBS

## 2021-06-03 VITALS — WEIGHT: 156 LBS | BODY MASS INDEX: 26.99 KG/M2

## 2021-06-03 VITALS — BODY MASS INDEX: 25.95 KG/M2 | HEIGHT: 64 IN | WEIGHT: 152 LBS

## 2021-06-03 VITALS — HEIGHT: 64 IN | BODY MASS INDEX: 26.12 KG/M2 | WEIGHT: 153 LBS

## 2021-06-03 NOTE — PROGRESS NOTES
Remote device check.  Please see link for full device report.  Patient was informed of results and next follow up via mail.   Alyssia Forrester RN

## 2021-06-04 ENCOUNTER — MYC MEDICAL ADVICE (OUTPATIENT)
Dept: FAMILY MEDICINE | Facility: CLINIC | Age: 71
End: 2021-06-04

## 2021-06-04 VITALS
BODY MASS INDEX: 25.12 KG/M2 | TEMPERATURE: 97.4 F | RESPIRATION RATE: 24 BRPM | HEIGHT: 64 IN | WEIGHT: 147.13 LBS | DIASTOLIC BLOOD PRESSURE: 78 MMHG | OXYGEN SATURATION: 99 % | HEART RATE: 69 BPM | SYSTOLIC BLOOD PRESSURE: 132 MMHG

## 2021-06-04 VITALS
BODY MASS INDEX: 25.78 KG/M2 | SYSTOLIC BLOOD PRESSURE: 114 MMHG | WEIGHT: 151 LBS | HEART RATE: 75 BPM | OXYGEN SATURATION: 98 % | HEIGHT: 64 IN | RESPIRATION RATE: 16 BRPM | DIASTOLIC BLOOD PRESSURE: 72 MMHG

## 2021-06-04 VITALS
RESPIRATION RATE: 16 BRPM | DIASTOLIC BLOOD PRESSURE: 76 MMHG | HEART RATE: 82 BPM | BODY MASS INDEX: 25.95 KG/M2 | SYSTOLIC BLOOD PRESSURE: 128 MMHG | WEIGHT: 150 LBS | TEMPERATURE: 97.3 F | OXYGEN SATURATION: 99 %

## 2021-06-04 VITALS
DIASTOLIC BLOOD PRESSURE: 70 MMHG | HEIGHT: 64 IN | HEART RATE: 78 BPM | TEMPERATURE: 97.9 F | SYSTOLIC BLOOD PRESSURE: 124 MMHG | RESPIRATION RATE: 16 BRPM | WEIGHT: 151.19 LBS | BODY MASS INDEX: 25.81 KG/M2 | OXYGEN SATURATION: 98 %

## 2021-06-04 DIAGNOSIS — Z96.653 STATUS POST TOTAL BILATERAL KNEE REPLACEMENT: Primary | ICD-10-CM

## 2021-06-04 RX ORDER — AMOXICILLIN 500 MG/1
CAPSULE ORAL
Qty: 16 CAPSULE | Refills: 0 | Status: SHIPPED | OUTPATIENT
Start: 2021-06-04 | End: 2021-11-08

## 2021-06-04 NOTE — TELEPHONE ENCOUNTER
----- Message from Josee Huff DO sent at 12/30/2019  3:36 PM CST -----  Please call Max,  Radiology has reviewed x-ray and confirms no pneumonia. We will be in touch with remaining labs when they return.  Josee Huff DO

## 2021-06-04 NOTE — TELEPHONE ENCOUNTER
Reason contacted:  Xray results  Information relayed:  Relayed Dr. Huff's message below.  Additional questions:  No  Further follow-up needed:  Yes, will call with lab results when they are back.  Okay to leave a detailed message:  NA

## 2021-06-04 NOTE — TELEPHONE ENCOUNTER
Who is calling:  The patient.  Reason for Call:  She would like Dr. Acevedo to know she has the same bug and she has for the last 5 years, and has scheduled an appointment for tomorrow.  The patient would appreciate it if she could just get the medications of antibiotic and that javier thing.  Date of last appointment with primary care: 10-30-19  Okay to leave a detailed message: Yes

## 2021-06-04 NOTE — PROGRESS NOTES
[facility] Note    Name: Deis Vega  : 1950   MRN: [id number]    Desi Vega is a 69 y.o. female presenting to discuss the following:     CC:   Chief Complaint   Patient presents with     Cough     Diarrhea     Fatigue       HPI:  Rashawn was seen on 19 by me, diagnosed with sinus infection, treated with Augmentin and tessalon perles.     Started to have diarrhea on . Thought symptoms were improving yesterday, ate oatmeal, had a banana. Has had 3 episodes of diarrhea. Has been off Augmentin for 3 days. Feeling weak and fatigued.     Cough was more loose, this morning feels like cough is more barky. Still having loose drainage, harder to spit up.       ROS:   CONSTITUTIONAL: Afebrile. Intermittently feels hungry but restrains due to loose stools.   HEENT: Still having facial pressure.   LUNGS: See above. No shortness of breath.   ABDOMEN: Mild nausea.     PMH:   Patient Active Problem List   Diagnosis     Symptomatic Menopause     Osteopenia     Ganglion Of The Left Foot     Sick Sinus Syndrome     Insomnia     Backache     S/P total knee arthroplasty     TGA (transient global amnesia)     H/O gastric bypass     Cardiac pacemaker in situ, dual chamber     Pulmonary embolism (H)     Hypertension     Hair loss       Past Medical History:   Diagnosis Date     Anhedonia      Arthritis      Diarrhea     thought to be due to gastric bypass     Grief reaction      History of palpitations      Hyperlipidemia      Insomnia      Osteopenia      Osteopenia      Shortness of breath      Sick sinus syndrome (H)     bradycardia - pacemaker placed     TGA (transient global amnesia)     Transient global amnesia - on plavix     Tinnitus      Urinary frequency     with some leakage     PSH:   Past Surgical History:   Procedure Laterality Date     ANKLE SURGERY       CARDIAC PACEMAKER PLACEMENT       CHOLECYSTECTOMY       GASTRIC BYPASS       HYSTERECTOMY  1988     JOINT REPLACEMENT  Left      CO CORRJ HALLUX VALGUS W/SESMDC W/2 OSTEOT      Description: Hallux Valgus (Bunion) Correction;  Proc Date: 01/01/2004;     CO HEMORRHOIDECTOMY INTERNAL RUBBER BAND LIGATIONS      Description: Hemorrhoidectomy;  Proc Date: 01/01/2012;     CO LAP,URETHRAL SUSPENSION      Description: Laparoscopic Urethral Suspension For Stress Incontinence;  Proc Date: 01/01/2002;     CO OPEN TREATMENT PROXIMAL FIBULA/SHAFT FRACTURE      Description: Open Treatment Of Fracture Of Fibular Shaft;  Recorded: 06/11/2013;     CO REMOVAL GALLBLADDER      Description: Cholecystectomy;  Proc Date: 01/01/1987;     CO REPAIR OF RECTOCELE      Description: Rectocele Repair;  Proc Date: 01/01/2002;     CO TOTAL ABDOM HYSTERECTOMY      Description: Hysterectomy;  Proc Date: 01/01/1988;  Comments: for menorrhagia, still has ovaries     REDUCTION MAMMAPLASTY Bilateral 1997     TOTAL KNEE ARTHROPLASTY Right 9/10/2015    Done by Dr. Gutierrez, [facility]     MEDICATIONS:   Current Outpatient Medications on File Prior to Visit   Medication Sig Dispense Refill     benzonatate (TESSALON PERLES) 100 MG capsule Take 1 capsule (100 mg total) by mouth 3 (three) times a day as needed for cough. 30 capsule 0     biotin 2,500 mcg cap Take 1 capsule by mouth daily.        cholecalciferol, vitamin D3, 5,000 unit Tab Take 5,000 Units by mouth every morning.        levothyroxine (SYNTHROID, LEVOTHROID) 25 MCG tablet TAKE 1 TABLET DAILY EXCEPT TAKE 2 TABLETS ON MONDAY AND FRIDAY 117 tablet 3     losartan (COZAAR) 25 MG tablet Take 1 tablet (25 mg total) by mouth daily. 90 tablet 3     lutein 20 mg cap Take 20 mg by mouth every morning.        multivit,stress formula-zinc (B COMPLEX-C-E-ZINC) Tab tablet Take 1 tablet on Monday, Friday 30 tablet 0     multivitamin therapeutic (THERAGRAN) tablet Take 1 tablet by mouth daily.       [DISCONTINUED] clopidogrel (PLAVIX) 75 mg tablet Take 1 tablet (75 mg total) by mouth daily. 90 tablet 3     ascorbic acid,  "vitamin C, (VITAMIN C) 100 MG tablet Take 100 mg by mouth daily.       cyclobenzaprine (FLEXERIL) 10 MG tablet Take 0.5 tablets (5 mg total) by mouth every 8 (eight) hours as needed for muscle spasms. 30 tablet 1     triamcinolone (KENALOG) 0.5 % ointment Apply topically 2 (two) times a day. 30 g 3     zolpidem (AMBIEN) 5 MG tablet TAKE 1 TABLET AT BEDTIME AS NEEDED FOR SLEEP 90 tablet 1     [DISCONTINUED] polymyxin B-trimethoprim (FOR POLYTRIM) 10,000 unit- 1 mg/mL Drop ophthalmic drops 1-2 drops to the affected eye(s) 4 (four) times a day for 7 days 1 Bottle 0     No current facility-administered medications on file prior to visit.      ALLERGIES:  Allergies   Allergen Reactions     Aspirin Swelling     Venom-Honey Bee Swelling     Throat swelling, hands swelling     PHYSICAL EXAM:   /78   Pulse 69   Temp 97.4  F (36.3  C) (Oral)   Resp 24   Ht 5' 3.75\" (1.619 m)   Wt 147 lb 2 oz (66.7 kg)   LMP 07/29/1988   SpO2 99%   BMI 25.45 kg/m     GENERAL: Rashawn is a pleasant, non-toxic appearing female in no acute distress. Appears stated age and a healthy weight.   HEENT: Sclera white, rhinorrhea present, oropharynx pink and moist, tympanic membranes normal bilaterally, no cervical lymphadenopathy.   HEART: Regular rate and rhythm, no murmurs.   LUNGS: Clear to auscultation bilaterally, unlabored.   ABDOMEN: Soft, non-tender to palpation, no palpable masses, no guarding, no rigidity, no rebound tenderness.     IMAGING:   CXR: Per my review of the x-ray, no focal infiltrates or effusions. No cardiomegaly. Awaiting radiology review.     ASSESSMENT & PLAN:   Desi Vega is a 69 y.o. female presenting today for evaluation of persistent cough and now antibiotic induced diarrhea with fatigue.    1. Diarrhea, unspecified type  2. Fatigue, unspecified type  - C. difficile Toxigenic by PCR; Future  - Basic Metabolic Panel    Concern for antibiotic induced diarrhea. As she is now off antibiotics and still " having diarrhea, recommend proceeding with evaluation for c diff given frequency of stools.    Given report of weakness and fatigue, will also screen for electrolyte abnormalities.     Continue with bland diet, oral rehydration as able. Await BMP results and c diff testing. If c diff testing is negative, may treat with imodium.     3. Cough  - XR Chest 2 Views     CXR obtained to evaluate for pneumonia given persistent productive cough and generalized weakness/fatigue. No focal findings on pulmonary exam. No infiltrates per my review but will await radiology review for confirmation and update patient.    Continue to manage postnasal drainage with symptomatic medications such as decongestants, antihistamines, and nasal flonase. Offered cough syrup with codeine to sleep but declines.     RTC: 1 month - medicare wellness exam     Josee Huff DO

## 2021-06-04 NOTE — TELEPHONE ENCOUNTER
Please let her know that I am looking forward to seeing her tomorrow.  I would need to see her prior to prescribing any antibiotic therapy so I am glad she has an appointment.    Dr Acevedo

## 2021-06-04 NOTE — TELEPHONE ENCOUNTER
----- Message from Josee Huff DO sent at 12/31/2019  4:13 PM CST -----  Please call Max and let her know that the c diff testing is negative. Her loose stools were likely a side effect of the antibiotic.  She can try imodium if she is still having loose stools.  Josee Huff DO

## 2021-06-04 NOTE — TELEPHONE ENCOUNTER
----- Message from Josee Huff DO sent at 12/30/2019 10:22 PM CST -----  Please call Max and let her know that her labs were normal. Kidney function normal, does not appear dehydrated. Electrolytes are in the normal range.  Josee Huff DO

## 2021-06-04 NOTE — TELEPHONE ENCOUNTER
Reason contacted:  Lab results  Information relayed:  Dr. Huff's message below was relayed to pt.  Additional questions:  No  Further follow-up needed:  No  Okay to leave a detailed message:  NA

## 2021-06-04 NOTE — PROGRESS NOTES
Please call Max and let her know that her labs were normal. Kidney function normal, does not appear dehydrated. Electrolytes are in the normal range.  Josee Huff, DO

## 2021-06-04 NOTE — PROGRESS NOTES
Please call Max and let her know that the c diff testing is negative. Her loose stools were likely a side effect of the antibiotic.  She can try imodium if she is still having loose stools.  Josee Huff, DO

## 2021-06-04 NOTE — TELEPHONE ENCOUNTER
Reason contacted:  c-diff results  Information relayed:  Dr. Huff's message below relayed to pt.  Additional questions:  No  Further follow-up needed:  No  Okay to leave a detailed message:  NA  Pt states she has not had any loose stools today.

## 2021-06-04 NOTE — PROGRESS NOTES
[facility] Note    Name: Desi Vega  : 1950   MRN: [id number]    Desi Vega is a 69 y.o. female presenting to discuss the following:     CC:   Chief Complaint   Patient presents with     Cough     Nasal Congestion       HPI:  Onset of symptoms last Thursday, postnasal drainage, settling into chest, productive cough with green sputum. Gets these symptoms annually and usually sees Dr. Acevedo. Last time, ended up with bacterial pneumonia.     Feels tessalon perles are helpful. Was treated with Augmentin last time.     States she usually has to take 2 rounds of antibiotics to clear the infection, partially improves between and then recurs again.    Taking Tylenol. Allergic to aspirin products.    ROS:   CONSTITUTIONAL: Chills yesterday, afebrile. Appetite is poor. Energy is decreased.   HEENT: No ear pain or pressure. No facial pressure. Post nasal drainage. Sore throat.   HEART: Central chest pressure/tightness from drainage.   LUNGS: Cough per above. A little short of breath.  ABDOMEN: No abdominal pain, mild nausea.    PMH:   Patient Active Problem List   Diagnosis     Symptomatic Menopause     Osteopenia     Ganglion Of The Left Foot     Sick Sinus Syndrome     Insomnia     Backache     S/P total knee arthroplasty     TGA (transient global amnesia)     H/O gastric bypass     Cardiac pacemaker in situ, dual chamber     Pulmonary embolism (H)     Hypertension     Hair loss       Past Medical History:   Diagnosis Date     Anhedonia      Arthritis      Diarrhea     thought to be due to gastric bypass     Grief reaction      History of palpitations      Hyperlipidemia      Insomnia      Osteopenia      Osteopenia      Shortness of breath      Sick sinus syndrome (H)     bradycardia - pacemaker placed     TGA (transient global amnesia)     Transient global amnesia - on plavix     Tinnitus      Urinary frequency     with some leakage       PSH:   Past Surgical History:    Procedure Laterality Date     ANKLE SURGERY       CARDIAC PACEMAKER PLACEMENT  1999     CHOLECYSTECTOMY       GASTRIC BYPASS       HYSTERECTOMY  1988     JOINT REPLACEMENT Left      NV CORRJ HALLUX VALGUS W/SESMDC W/2 OSTEOT      Description: Hallux Valgus (Bunion) Correction;  Proc Date: 01/01/2004;     NV HEMORRHOIDECTOMY INTERNAL RUBBER BAND LIGATIONS      Description: Hemorrhoidectomy;  Proc Date: 01/01/2012;     NV LAP,URETHRAL SUSPENSION      Description: Laparoscopic Urethral Suspension For Stress Incontinence;  Proc Date: 01/01/2002;     NV OPEN TREATMENT PROXIMAL FIBULA/SHAFT FRACTURE      Description: Open Treatment Of Fracture Of Fibular Shaft;  Recorded: 06/11/2013;     NV REMOVAL GALLBLADDER      Description: Cholecystectomy;  Proc Date: 01/01/1987;     NV REPAIR OF RECTOCELE      Description: Rectocele Repair;  Proc Date: 01/01/2002;     NV TOTAL ABDOM HYSTERECTOMY      Description: Hysterectomy;  Proc Date: 01/01/1988;  Comments: for menorrhagia, still has ovaries     REDUCTION MAMMAPLASTY Bilateral 1997     TOTAL KNEE ARTHROPLASTY Right 9/10/2015    Done by Dr. Gutierrez, [facility]     MEDICATIONS:   Current Outpatient Medications on File Prior to Visit   Medication Sig Dispense Refill     biotin 2,500 mcg cap Take 1 capsule by mouth daily.        cholecalciferol, vitamin D3, 5,000 unit Tab Take 5,000 Units by mouth every morning.        levothyroxine (SYNTHROID, LEVOTHROID) 25 MCG tablet TAKE 1 TABLET DAILY EXCEPT TAKE 2 TABLETS ON MONDAY AND FRIDAY 117 tablet 3     losartan (COZAAR) 25 MG tablet Take 1 tablet (25 mg total) by mouth daily. 90 tablet 3     lutein 20 mg cap Take 20 mg by mouth every morning.        multivit,stress formula-zinc (B COMPLEX-C-E-ZINC) Tab tablet Take 1 tablet on Monday, Friday 30 tablet 0     multivitamin therapeutic (THERAGRAN) tablet Take 1 tablet by mouth daily.       amoxicillin (AMOXIL) 500 MG capsule Predental       ascorbic acid, vitamin C, (VITAMIN C) 100  "MG tablet Take 100 mg by mouth daily.       clopidogrel (PLAVIX) 75 mg tablet Take 1 tablet (75 mg total) by mouth daily. 90 tablet 3     cyclobenzaprine (FLEXERIL) 10 MG tablet Take 0.5 tablets (5 mg total) by mouth every 8 (eight) hours as needed for muscle spasms. 30 tablet 1     polymyxin B-trimethoprim (FOR POLYTRIM) 10,000 unit- 1 mg/mL Drop ophthalmic drops 1-2 drops to the affected eye(s) 4 (four) times a day for 7 days 1 Bottle 0     triamcinolone (KENALOG) 0.5 % ointment Apply topically 2 (two) times a day. 30 g 3     zolpidem (AMBIEN) 5 MG tablet TAKE 1 TABLET AT BEDTIME AS NEEDED FOR SLEEP 90 tablet 1     No current facility-administered medications on file prior to visit.      ALLERGIES:  Allergies   Allergen Reactions     Aspirin Swelling     Venom-Honey Bee Swelling     Throat swelling, hands swelling     SOCIAL HISTORY:   Social History     Tobacco Use     Smoking status: Never Smoker     Smokeless tobacco: Never Used   Substance Use Topics     Alcohol use: Yes     Comment: rare     Drug use: No       PHYSICAL EXAM:   /70   Pulse 78   Temp 97.9  F (36.6  C) (Oral)   Resp 16   Ht 5' 3.75\" (1.619 m)   Wt 151 lb 3 oz (68.6 kg)   LMP 07/29/1988   SpO2 98%   BMI 26.16 kg/m     GENERAL: Rashawn is a pleasant, non-toxic appearing elderly female.   HEENT: Sclera white, nasal congestion present, focal pressure right maxilla, oropharynx pink and moist, tympanic membranes normal bilaterally, oropharynx pink and moist.   HEART: Regular rate and rhythm, no murmurs.   LUNGS: Clear to auscultation bilaterally, unlabored. Cough present, exacerbated with deep breaths.   ABDOMEN: Soft, non-tender to palpation.     ASSESSMENT & PLAN:   Desi Vega is a 69 y.o. female presenting today for evaluation of respiratory infection symptoms.    1. Acute non-recurrent maxillary sinusitis  - amoxicillin-clavulanate (AUGMENTIN) 875-125 mg per tablet; Take 1 tablet by mouth 2 (two) times a day for 10 days.  " Dispense: 20 tablet; Refill: 0  - benzonatate (TESSALON PERLES) 100 MG capsule; Take 1 capsule (100 mg total) by mouth 3 (three) times a day as needed for cough.  Dispense: 30 capsule; Refill: 0      Given congestion and focal facial pressure, will treat with oral antibiotics.     RTC: 1 month - medicare annual wellness exam    Josee Huff DO

## 2021-06-04 NOTE — PROGRESS NOTES
Please call Max,  Radiology has reviewed x-ray and confirms no pneumonia. We will be in touch with remaining labs when they return.  Josee Huff, DO

## 2021-06-05 VITALS — HEIGHT: 64 IN | BODY MASS INDEX: 25.27 KG/M2 | WEIGHT: 148 LBS

## 2021-06-05 VITALS
BODY MASS INDEX: 25.27 KG/M2 | HEART RATE: 67 BPM | SYSTOLIC BLOOD PRESSURE: 98 MMHG | TEMPERATURE: 96 F | RESPIRATION RATE: 12 BRPM | WEIGHT: 148 LBS | DIASTOLIC BLOOD PRESSURE: 73 MMHG | HEIGHT: 64 IN

## 2021-06-05 NOTE — TELEPHONE ENCOUNTER
Rashawn was in a couple of weeks ago on 12/17/2019 and  Was put on antibiotic and finished antibiotic and symptoms got better.  Today symptoms  are coming back again.  Today a runny nose, cough and is just beginning. Rashawn was told by MD Acevedo to call right away when symptoms start.   Rashawn is requesting an antibiotic and tessalon perles again.    Please phone Rashawn at [phone].

## 2021-06-05 NOTE — TELEPHONE ENCOUNTER
I am happy to send the Tesselon perles - would not trat one day of Sx with Abx but if she is still having Sx at the end of next week (or having any shortness or breath, etc) we are happy to see her    She saw Dr Huff the last two times but appears she did not have pneumonia per xray    Tesselon perles sent    BB

## 2021-06-05 NOTE — TELEPHONE ENCOUNTER
Spoke to pt, relayed MD message below.  Pt scheduled an appt for 1/24 with Dr. Acevedo in case she is not feeling better.  She will cancel if not needed.  Pt asked that rx for tessalon perles be called into [facility] pharmacy instead of [facility].  Rx cancelled at [facility] and called in verbal to [facility] pharmacy.

## 2021-06-05 NOTE — PROGRESS NOTES
Assessment/Plan:    Desi Vega is a 69 y.o. female presenting for:    1. Acute recurrent maxillary sinusitis  Recurrent sinusitis.  She has Tessalon Perles at the pharmacy.  Cough syrup with codeine given.  Because she has been on several courses of antibiotics and they have not been that helpful I did send Levaquin to the pharmacy.  I discussed with the patient that this would never be a first-line medication that we would use and if she has recurrent sinus infections next year we would likely start with Augmentin again.    Discussed the risks and black box warning of Levaquin.  - codeine-guaiFENesin (GUAIFENESIN AC)  mg/5 mL liquid; Take 5 mL by mouth 2 (two) times a day as needed for cough.  Dispense: 240 mL; Refill: 0  - levoFLOXacin (LEVAQUIN) 500 MG tablet; Take 1 tablet (500 mg total) by mouth daily for 7 days.  Dispense: 7 tablet; Refill: 0        There are no discontinued medications.        Chief Complaint:  Chief Complaint   Patient presents with     Cough     Cough and congestion since before Thanksgiving       Subjective:   Desi Vega is a very pleasant 69-year-old female presenting to the clinic today with concerns over sinusitis.  Patient states that she has been sick for the last 2 months.  She has had 2 rounds of antibiotics (namely Augmentin) and each of these had helped for about a week or 2 but then she became ill again.  She states that her sinuses are very congested with a lot of pain.  She is had draining which is been causing coughing.  No shortness of breath.    She has not been having any fevers.  She has been eating and drinking fairly well.  She is going on a cruise in 2 weeks.      She has not been sleeping well due to the coughing.  She has been coughing up a clearish yellowish sputum.    Chest x-ray done at her last visit was normal.    12 point review of systems completed and negative except for what has been described above    Social History     Tobacco  Use   Smoking Status Never Smoker   Smokeless Tobacco Never Used       Current Outpatient Medications   Medication Sig     ascorbic acid, vitamin C, (VITAMIN C) 100 MG tablet Take 100 mg by mouth daily.     benzonatate (TESSALON PERLES) 100 MG capsule Take 1 capsule (100 mg total) by mouth 3 (three) times a day as needed for cough.     biotin 2,500 mcg cap Take 1 capsule by mouth daily.      cholecalciferol, vitamin D3, 5,000 unit Tab Take 5,000 Units by mouth every morning.      cyclobenzaprine (FLEXERIL) 10 MG tablet Take 0.5 tablets (5 mg total) by mouth every 8 (eight) hours as needed for muscle spasms.     levothyroxine (SYNTHROID, LEVOTHROID) 25 MCG tablet TAKE 1 TABLET DAILY EXCEPT TAKE 2 TABLETS ON MONDAY AND FRIDAY     losartan (COZAAR) 25 MG tablet Take 1 tablet (25 mg total) by mouth daily.     lutein 20 mg cap Take 20 mg by mouth every morning.      multivit,stress formula-zinc (B COMPLEX-C-E-ZINC) Tab tablet Take 1 tablet on Monday, Friday     multivitamin therapeutic (THERAGRAN) tablet Take 1 tablet by mouth daily.     triamcinolone (KENALOG) 0.5 % ointment Apply topically 2 (two) times a day.     zolpidem (AMBIEN) 5 MG tablet TAKE 1 TABLET AT BEDTIME AS NEEDED FOR SLEEP     codeine-guaiFENesin (GUAIFENESIN AC)  mg/5 mL liquid Take 5 mL by mouth 2 (two) times a day as needed for cough.     levoFLOXacin (LEVAQUIN) 500 MG tablet Take 1 tablet (500 mg total) by mouth daily for 7 days.         Objective:  Vitals:    01/24/20 1016   BP: 128/76   Pulse: 82   Resp: 16   Temp: 97.3  F (36.3  C)   TempSrc: Oral   SpO2: 99%   Weight: 150 lb (68 kg)       Body mass index is 25.95 kg/m .    Vital signs reviewed and stable  General: No acute distress  Psych: Appropriate affect  HEENT: moist mucous membranes, pupils equal, round, reactive to light and accomodation, posterior oropharynx clear of erythema or exudate, tympanic membranes are pearly grey bilaterally  Lymph: no cervical or supraclavicular  lymphadenopathy  Cardiovascular: regular rate and rhythm with no murmur  Pulmonary: clear to auscultation bilaterally with no wheeze  Abdomen: soft, non tender, non distended with normo-active bowel sounds  Extremities: warm and well perfused with no edema  Skin: warm and dry with no rash         This note has been dictated and transcribed using voice recognition software.   Any errors in transcription are unintentional and inherent to the software.

## 2021-06-09 NOTE — PROGRESS NOTES
[facility] Progress Note    Assessment:  1.  Chronic dyspnea on exertion which is persistent and frustrating.  Echo stress test September 2016 did not demonstrate any echocardiographic evidence for exercise-induced ischemia.  Her prior CT PE run reported no evidence for coronary calcification.  I discussed today that I did not feel as it was likely that underlying coronary artery disease was the etiology of her shortness of breath.  She however remains  concerned and given the persistence of symptoms we are going to pursue a CT coronary angiogram.  I reviewed the potential risks of contrast exposure and x-ray exposure and the noninvasive nature of the examination.  She has had no prior difficulty with x-ray exposure.  If the CT is negative I would asked that she follow-up with Dr. Watt to further investigate pulmonary causes of shortness of breath.  It might be reasonable to consider cardiopulmonary stress testing as well.    2.  Hypertension.  Blood pressure appears to be well-controlled and I renewed her losartan today.Lab studies from July 2016 are reviewed.    3.  Anemia.  This is deferred to her primary care physician and whether some component of shortness of breath is related to anemia should be considered.        Plan: As outlined above with follow-up in 6 months    1. Dyspnea  CTA Coronary W Calcium Score   2. Essential hypertension with goal blood pressure less than 140/90  losartan (COZAAR) 25 MG tablet   3. Sick sinus syndrome           An After Visit Summary was printed and given to the patient.    Subjective:    Desi Vega is a 66 y.o. female who returned for a planned  follow up visit.  She continues to be bothered by breathlessness with exertion.  She reports short of breath especially if she is carrying something or climbing stairs.  She does not have complaints of chest discomfort.  She does not have complaints of orthopnea or PND.  She has had a prior pulmonary  embolism subsequent CT PE on the demonstrated resolution of her prior documented pulmonary embolism.  She had an exercise stress echocardiogram that demonstrated reduced but on the stress echocardiogram left ventricular function was said to be normal for previous she was reported to have mild reduction in left ventricular systolic function.  An echocardiogram from March 2016 reported estimate of RV systolic pressure to be within normal limits to borderline increased    Review of Systems:   General: WNL  Eyes: WNL  Ears/Nose/Throat: WNL  Lungs: Shortness of Breath  Heart: Shortness of Breath with activity  Stomach: WNL  Bladder: WNL  Muscle/Joints: WNL  Skin: WNL  Nervous System: WNL  Mental Health: WNL     Blood: WNL      Problem List:    Patient Active Problem List   Diagnosis     Subjective Tinnitus     Symptomatic Menopause     Cervicalgia     Osteopenia     Ganglion Of The Left Foot     Sick Sinus Syndrome     Insomnia     Backache     Grief Reaction     Loss Of Pleasure From Usual Activities (Anhedonia)     Sinusitis     S/P total knee arthroplasty     Sick sinus syndrome     TGA (transient global amnesia)     Acute blood loss anemia     H/O gastric bypass     Pacemaker     Bilateral pulmonary embolism     Pulmonary embolism       Social History     Social History     Marital status:      Spouse name: N/A     Number of children: N/A     Years of education: N/A     Occupational History     Not on file.     Social History Main Topics     Smoking status: Never Smoker     Smokeless tobacco: Not on file     Alcohol use Yes      Comment: rare     Drug use: No     Sexual activity: Not on file     Other Topics Concern     Not on file     Social History Narrative       Family History   Problem Relation Age of Onset     Heart attack Father      Diabetes Maternal Grandmother      Diabetes Brother        Current Outpatient Prescriptions   Medication Sig Dispense Refill     amoxicillin (AMOXIL) 500 MG capsule  "Predental       benzonatate (TESSALON PERLES) 100 MG capsule Take 1 capsule (100 mg total) by mouth every 6 (six) hours as needed for cough. 30 capsule 0     biotin 2,500 mcg cap Take 1 capsule by mouth daily.        cholecalciferol, vitamin D3, 5,000 unit Tab Take 5,000 Units by mouth every morning.        clopidogrel (PLAVIX) 75 mg tablet Take 1 tablet (75 mg total) by mouth daily. 90 tablet 3     coconut oil 1,000 mg cap Take 4 capsules by mouth daily.       ketoconazole (NIZORAL) 2 % cream as needed.   1     losartan (COZAAR) 25 MG tablet Take 1 tablet (25 mg total) by mouth daily. 90 tablet 4     lutein 20 mg cap Take 20 mg by mouth every morning.        multivitamin therapeutic (THERAGRAN) tablet Take 1 tablet by mouth daily.       zolpidem (AMBIEN) 5 MG tablet Take 1 tablet (5 mg total) by mouth bedtime as needed for sleep. 90 tablet 3     No current facility-administered medications for this visit.        Objective:     /84 (Patient Site: Right Arm, Patient Position: Sitting, Cuff Size: Adult Regular)  Pulse 76  Resp 16  Ht 5' 4\" (1.626 m)  Wt 162 lb (73.5 kg)  LMP 07/29/1988  BMI 27.81 kg/m2  162 lb (73.5 kg)   [unfilled]    Physical Exam:    GENERAL APPEARANCE: alert, no apparent distress  HEENT: no scleral icterus or xanthelasma  NECK: jugular venous pressure   CHEST: symmetric, the lungs are clear to auscultation  CARDIOVASCULAR: regular rhythm without murmur, click, or gallop; no carotid bruits  Abdomen: No Organomegaly, masses, bruits, or tenderness. Bowels sounds are present      EXTREMITIES: no cyanosis, clubbing or edema    Cardiac Testing:  Procedure Date  Date: 03/21/2016 Start: 11:04 AM     Study Location: [facility]  Technical Quality: Adequate visualization     Patient Status: Routine     Contrast Medium: Definity. Used - 2 mland Wasted - 8 ml     Height: 64 inches Weight: 165 pounds BSA: 1.8 m^2 BMI: 28.32 kg/m^2     BP: 116/80 " mmHg     Indications  Cardiomyopathy.     Conclusions      Summary  Definity contrast was used.  Normal left ventricular size and lower limits of normal to mildly reduced  systolic function.  Left ventricular ejection fraction is visually estimated to be 45-50%.  Pacemaker noted in the RA and RV  No significant valvular abnormalities.  Pleural effusion is noted.  Compared to 11/2015 no significant change is observed  Procedure Date  Date: 09/02/2016 Start: 09:15 AM     Study Location: [facility]  Technical Quality: Limited visualization due to poor acoustical window.     Patient Status: Routine     Contrast Medium: Definity. Used - 7 mland Wasted - 3 ml     Height: 64 inches Weight: 166 pounds BSA: 1.81 m^2 BMI: 28.49 kg/m^2     HR: 96 bpm BP: 122/90 mmHg     Indications  Dyspnea.     Conclusions      Summary  No chest pain with exercise.  Exercise capacity is appropriate for age.  ECG portion of stress test is negative for ischemia.  Normal exercise stress echocardiogram.  Lab Results:    Lab Results   Component Value Date     07/07/2016    K 3.7 07/07/2016     07/07/2016    CO2 21 (L) 07/07/2016    BUN 14 07/07/2016    CREATININE 0.83 07/07/2016    CALCIUM 9.7 07/07/2016     Lab Results   Component Value Date    CHOL 205 (H) 03/18/2016    TRIG 99 03/18/2016    HDL 81 03/18/2016     BNP (pg/mL)   Date Value   01/20/2016 13   11/18/2015 48   09/22/2015 259 (H)     Creatinine (mg/dL)   Date Value   07/07/2016 0.83   07/01/2016 0.78   03/18/2016 0.72   11/18/2015 0.78     LDL Calculated (mg/dL)   Date Value   03/18/2016 104   06/16/2014 136 (H)   06/11/2013 130 (H)     Lab Results   Component Value Date    WBC 8.3 07/07/2016    WBC 7.4 09/23/2015    HGB 10.4 (L) 07/07/2016    HCT 34.4 (L) 07/07/2016    MCV 71 (L) 07/07/2016     07/07/2016               This note has been dictated using voice recognition software. Any grammatical or context distortions are unintentional and inherent to the  software.      Orlin Wells M.D., F.A.C.Formerly [facility], [facility]  [phone]

## 2021-06-09 NOTE — PROGRESS NOTES
Type: routine pacemaker remote transmission done prior to seeing Russell on 3/24.  Presenting rhythm: sinus, rate 90-100bpm.  Battery/lead status: stable.  Arrhythmias: since 1/30/17; none detected.  Comments Normal magnet and pacemaker function. Routed results to Russell. VENITA

## 2021-06-10 NOTE — TELEPHONE ENCOUNTER
Refill Approved    Rx renewed per Medication Renewal Policy. Medication was last renewed on 9/10/1.    Tori Ortiz, [facility] Triage/Med Refill 8/11/2020     Requested Prescriptions   Pending Prescriptions Disp Refills     levothyroxine (SYNTHROID, LEVOTHROID) 25 MCG tablet 117 tablet 3     Sig: TAKE 1 TABLET DAILY EXCEPT TAKE 2 TABLETS ON MONDAY AND FRIDAY       Thyroid Hormones Protocol Passed - 8/10/2020 10:23 AM        Passed - Provider visit in past 12 months or next 3 months     Last office visit with prescriber/PCP: 1/24/2020 Roz Acevedo MD OR same dept: 1/24/2020 Roz Acevedo MD OR same specialty: 1/24/2020 Roz Acevedo MD  Last physical: 9/10/2018 Last MTM visit: Visit date not found   Next visit within 3 mo: Visit date not found  Next physical within 3 mo: Visit date not found  Prescriber OR PCP: Roz Acevedo MD  Last diagnosis associated with med order: 1. Hypothyroid  - levothyroxine (SYNTHROID, LEVOTHROID) 25 MCG tablet; TAKE 1 TABLET DAILY EXCEPT TAKE 2 TABLETS ON MONDAY AND FRIDAY  Dispense: 117 tablet; Refill: 3    If protocol passes may refill for 12 months if within 3 months of last provider visit (or a total of 15 months).             Passed - TSH on file in past 12 months for patient age 12 & older     TSH   Date Value Ref Range Status   08/05/2020 0.97 0.30 - 5.00 uIU/mL Final

## 2021-06-10 NOTE — TELEPHONE ENCOUNTER
It looks like Rashawn made an appointment in October with the doctor.  There are no notes saying whether she still wants to have labs done early and the appointment is still on the lab schedule for tomorrow.

## 2021-06-10 NOTE — TELEPHONE ENCOUNTER
Please help this patient make a video appointment for next week (AWV if possible) and route back to me for lbs if she wants them beforehand    Thanks!    BB

## 2021-06-10 NOTE — TELEPHONE ENCOUNTER
Who is calling:    Patient     Reason for Call:    Caller states she is coming in for labs 08/05/2020 and also has an upcoming physical on 10/12/2020 and question if PCP can order additional labs to cover the blood screening for here AWV?  States she has been having some Gastro distress lately and question if any labs can be done to test for that as well?     Date of last appointment with primary care: 01/24/2020  Okay to leave a detailed message: Yes     Please place order and notify patient to the outcome      Any other labs you want to add?

## 2021-06-10 NOTE — TELEPHONE ENCOUNTER
Wellness Screening Tool  Symptom Screening:  Do you have one of the following NEW symptoms:    Fever (subjective or >100.0)?  No    A new cough?  No    Shortness of breath?  No     Chills? No     New loss of taste or smell? No     Generalized body aches? No     New persistent headache? No     New sore throat? No     Nausea, vomiting, or diarrhea?  No    Within the past 3 weeks, have you been exposed to someone with a known positive illness below:    COVID-19 (known or suspected)?  No    Chicken pox?  No    Mealses?  No    Pertussis?  No    Patient notified of visitor policy- They may have one person accompany them to their appointment, but they will need to wear a mask and will be screened upon arrival for symptoms: Yes  Pt informed to wear a mask: Yes  Pt notified if they develop any symptoms listed above, prior to their appointment, they are to call the clinic directly at [phone] for further instructions.  Yes  Patient's appointment status: Patient will be seen in clinic as scheduled on 8/10/20

## 2021-06-10 NOTE — TELEPHONE ENCOUNTER
I have ordered her basic lab test.  If other tests are necessary on her day of her physical we can certainly order those as well at that time.  Otherwise, she can hold off on coming into labs and we can just do them when she comes in in October.    BB

## 2021-06-10 NOTE — TELEPHONE ENCOUNTER
Refill Request  Did you contact pharmacy: No  Medication name:   Requested Prescriptions     Pending Prescriptions Disp Refills     levothyroxine (SYNTHROID, LEVOTHROID) 25 MCG tablet 117 tablet 3     Sig: TAKE 1 TABLET DAILY EXCEPT TAKE 2 TABLETS ON MONDAY AND FRIDAY     Who prescribed the medication: Roz Acevedo MD  Requested Pharmacy: [facility]  Is patient out of medication: No  Patient notified refills processed in 3 business days:  yes  Okay to leave a detailed message: no

## 2021-06-10 NOTE — PATIENT INSTRUCTIONS - HE
Please call my nurse Evette with any heart related questions.Your recent blood work all looks stable.Please call her at [phone].Your most recent TSH is 0.97, your cholesterol was 162.

## 2021-06-10 NOTE — TELEPHONE ENCOUNTER
Attempted to call pt, no answer, no voicemail, unable to leave a message.   Dr. Acevedo-  Do you want to order labs in case pt does come in for lab appt?

## 2021-06-10 NOTE — TELEPHONE ENCOUNTER
Who is calling:    Patient    Reason for Call:    Caller states she is coming in for labs 08/05/2020 and also has an upcoming physical on 10/12/2020 and question if PCP can order additional labs to cover the blood screening for here AWV?  States she has been having some Gastro distress lately and question if any labs can be done to test for that as well?    Date of last appointment with primary care: 01/24/2020  Okay to leave a detailed message: Yes    Please place order and notify patient to the outcome..

## 2021-06-10 NOTE — TELEPHONE ENCOUNTER
Attempted to call pt, no answer, no voicemail, unable to leave a msg.  If pt should call back, please relay MD message below and assist with scheduling a video visit appt with Dr. Acevedo the week of 8/10. If she would still like to have her labs done on 8/5, please document and Dr. Acevedo will put in the order.

## 2021-06-11 NOTE — PROGRESS NOTES
Assessment/Plan:     Health maintenance female exam.  All questions answered.  Await pap smear results.  Breast self exam technique reviewed and patient encouraged to perform self-exam monthly.  Discussed healthy lifestyle modifications.  Mammogram ordered.  Await fasting lab results  The following high BMI interventions were performed this visit: encouragement to exercise    1. Other insomnia  Ambien sent to the pharmacy.  - zolpidem (AMBIEN) 5 MG tablet; Take 1 tablet (5 mg total) by mouth at bedtime as needed for sleep.  Dispense: 90 tablet; Refill: 1    2. TGA (transient global amnesia)  We have discussed several times I am not sure if she actually needs this medication but she prefers to continue taking it.  Discussed the risks of the medication.  It would be interesting to have her see the neurologist to get their opinion but she is not interested at this point.  - clopidogrel (PLAVIX) 75 mg tablet; Take 1 tablet (75 mg total) by mouth daily.  Dispense: 90 tablet; Refill: 3    3. Anemia  Unclear etiology.  Potentially vitamin B12 deficiency but I would expect that to be a bit more macrocytic.  Iron studies will be done today.  She has had to have iron infusions in the past.  - HM1(CBC and Differential)  - Iron and Transferrin Iron Binding Capacity  - HM1 (CBC with Diff)    4. Fatigue  - Thyroid Cascade  - Vitamin B12  - Vitamin D, Total (25-Hydroxy)    5. Hypertension  Well-controlled today.  - Basic Metabolic Panel    6. Healthcare maintenance  - Hepatitis C Virus (HCV) RNA Detection and Quantification by RT-PCR ($$$)  - Lipid Cascade FASTING    7. Hyperglycemia  Most recent A1c was 6.0.  She prefers that we recheck this yearly to ensure that it does not become more elevated.  - Glycosylated Hemoglobin A1c          Subjective:      Desi Vega is a 67 y.o. female who presents for an annual exam.  She is overall doing very well.  She really has no concerns today.    She does note that she has  been a bit more fatigued recently.  When she saw a lung specialist, who found that her lung function was normal, it was noted that she was a bit anemic with a hemoglobin of 9.  She states that she has had a history of anemia ever since her gastric bypass surgery and actually had to have iron infusions a few years back.  She was getting vitamin B12 injections as well but decided that she did not want to get those anymore and stopped.  Unfortunately there was not an MCV on her most recent hemoglobin level.    She is up-to-date with her colonoscopy with her most recent colonoscopy being about 5 years ago and normal per her report.      Healthy Habits:   Regular Exercise: Yes  Sunscreen Use: Yes  Healthy Diet: Yes  Dental Visits Regularly: Yes  Seat Belt: Yes  Sexually active: No  Self Breast Exam Monthly:Yes  Colonoscopy: Yes  Prevention of Osteoporosis: No  Last Dexa: Yes    Immunization History   Administered Date(s) Administered     Influenza high dose, seasonal 2013, 2015, 09/15/2016     Influenza, inj, historic 10/01/2014     Pneumo Conj 13-V (&after) 2014     Pneumo Polysac 23-V 2014     Tdap 2016     Immunization status: up to date and documented.    Gynecologic History  Patient's last menstrual period was 1988.  Contraception: post menopausal status  Last mammogram: . Results were: normal      OB History    Para Term  AB Living   2 2 2      SAB TAB Ectopic Multiple Live Births             # Outcome Date GA Lbr Luciano/2nd Weight Sex Delivery Anes PTL Lv   2 Term            1 Term                   Current Outpatient Prescriptions   Medication Sig Dispense Refill     amoxicillin (AMOXIL) 500 MG capsule Predental       benzonatate (TESSALON PERLES) 100 MG capsule Take 1 capsule (100 mg total) by mouth every 6 (six) hours as needed for cough. 30 capsule 0     biotin 2,500 mcg cap Take 1 capsule by mouth daily.        cholecalciferol, vitamin D3, 5,000 unit Tab  Take 5,000 Units by mouth every morning.        clopidogrel (PLAVIX) 75 mg tablet Take 1 tablet (75 mg total) by mouth daily. 90 tablet 3     coconut oil 1,000 mg cap Take 2 capsules by mouth daily.        ketoconazole (NIZORAL) 2 % cream as needed.   1     losartan (COZAAR) 25 MG tablet Take 1 tablet (25 mg total) by mouth daily. 90 tablet 4     lutein 20 mg cap Take 20 mg by mouth every morning.        multivitamin therapeutic (THERAGRAN) tablet Take 1 tablet by mouth daily.       zolpidem (AMBIEN) 5 MG tablet Take 1 tablet (5 mg total) by mouth at bedtime as needed for sleep. 90 tablet 1     No current facility-administered medications for this visit.      Past Medical History:   Diagnosis Date     Anhedonia      Arthritis      Diarrhea     thought to be due to gastric bypass     Grief reaction      History of palpitations      Hyperlipidemia      Insomnia      Osteopenia      Osteopenia      Shortness of breath      Sick sinus syndrome     bradycardia - pacemaker placed     TGA (transient global amnesia) 2004    Transient global amnesia - on plavix     Tinnitus      Urinary frequency     with some leakage     Past Surgical History:   Procedure Laterality Date     ANKLE SURGERY       CARDIAC PACEMAKER PLACEMENT  1999     CHOLECYSTECTOMY       GASTRIC BYPASS       HYSTERECTOMY  1988     JOINT REPLACEMENT Left      CT CORRJ HALLUX VALGUS W/SESMDC W/2 OSTEOT      Description: Hallux Valgus (Bunion) Correction;  Proc Date: 01/01/2004;     CT HEMORRHOIDECTOMY INTERNAL RUBBER BAND LIGATIONS      Description: Hemorrhoidectomy;  Proc Date: 01/01/2012;     CT LAP,URETHRAL SUSPENSION      Description: Laparoscopic Urethral Suspension For Stress Incontinence;  Proc Date: 01/01/2002;     CT OPEN TREATMENT PROXIMAL FIBULA/SHAFT FRACTURE      Description: Open Treatment Of Fracture Of Fibular Shaft;  Recorded: 06/11/2013;     CT REMOVAL GALLBLADDER      Description: Cholecystectomy;  Proc Date: 01/01/1987;     CT REPAIR OF  "RECTOCELE      Description: Rectocele Repair;  Proc Date: 01/01/2002;     FL TOTAL ABDOM HYSTERECTOMY      Description: Hysterectomy;  Proc Date: 01/01/1988;  Comments: for menorrhagia, still has ovaries     REDUCTION MAMMAPLASTY Bilateral 1997     TOTAL KNEE ARTHROPLASTY Right 9/10/2015    Done by Dr. Gutierrez, [facility]     Aspirin (tartrazine only) and Venom-honey bee  Family History   Problem Relation Age of Onset     Heart attack Father      Diabetes Maternal Grandmother      Diabetes Brother      Social History     Social History     Marital status:      Spouse name: N/A     Number of children: N/A     Years of education: N/A     Occupational History     Not on file.     Social History Main Topics     Smoking status: Never Smoker     Smokeless tobacco: Not on file     Alcohol use Yes      Comment: rare     Drug use: No     Sexual activity: Not on file     Other Topics Concern     Not on file     Social History Narrative       Review of Systems  General:  Denies problems  Eyes:  Denies problems  Ears/Nose/Throat:  Denies problems  Cardiovascular:  Denies problems  Respiratory:  Denies problems  Gastrointestinal:  Denies problems  Genitourinary:  Denies problems  Musculoskeletal:  Denies problems  Skin:  Denies problems, Neurologic:  Denies problems  Psychiatric:  Denies problems  Endocrine:  Denies problems  Heme/Lymphatic:  Denies problems  Allergic/Immunologic:  Denies problems       Objective:         Vitals:    07/17/17 0905   BP: 118/70   Pulse: 68   Resp: 16   Temp: 97.7  F (36.5  C)   TempSrc: Oral   Weight: 159 lb (72.1 kg)   Height: 5' 4\" (1.626 m)       Physical Exam:  General Appearance: Alert, cooperative, no distress, appears stated age   Head: Normocephalic, without obvious abnormality, atraumatic  Eyes: PERRL, conjunctiva/corneas clear, EOM's intact   Ears: Normal TM's and external ear canals, both ears  Nose:Nares normal, septum midline,mucosa normal, no drainage    Throat:Lips, " mucosa, and tongue normal; teeth and gums normal  Neck: Supple, symmetrical, trachea midline, no adenopathy;  thyroid: not enlarged, symmetric, no tenderness/mass/nodules  Back: Symmetric, no curvature, ROM normal,  Lungs: Clear to auscultation bilaterally, respirations unlabored  Breasts: No breast masses, tenderness, asymmetry, or nipple discharge.  Heart: Regular rate and rhythm, S1 and S2 normal, no murmur, rub, or gallop  Abdomen: Soft, non-tender, bowel sounds active all four quadrants,  no masses, no organomegaly  Extremities: Extremities normal, atraumatic, no cyanosis or edema  Skin: Skin color, texture, turgor normal, no rashes or lesions  Lymph nodes: Cervical, supraclavicular, and axillary nodes normal and   Neurologic: Normal

## 2021-06-11 NOTE — PROGRESS NOTES
Patient oxygen saturation on RA at rest is 99%.  Oxygen saturation after ambulating 150ft on RA is 98%.

## 2021-06-11 NOTE — PROGRESS NOTES
PULMONARY OUTPATIENT CONSULT NOTE    Assessment:   1. Exertional dyspnea  Likely related to severe deconditioning. Decline physical activity since bilateral knee surgery 2014 and 2015. Unchanged SpO2 with activities.   Patient had a negative exercise stress echocardiogram. Adequate function of pacemaker. Hx pulmonary embolism post knee surgery 9/2015, finished 6 months anticoagulation, no pulmonary hypertension, normal RV size and function per echocardiogram, no signs of recurrent PE per CTA.   No tobacco use in the past, no outdoor allergies. Recent PFTs were within normal limits.   2. HTN  3. S/p pacemaker for SSS  4. Hx pulmonary embolism post knee surgery 9/2015  Finished 6 months anticoagulation  5. Bilateral knee arthroplasty   6. Severe deconditioning      Plan:   1. Increase physical activity   2. Contact us if you change your mind regarding referral to physical therapy  3. Follow up as needed    Thank you for this consultation.     Ronaldo Doyle  Pulmonary / Critical Care  6/12/2017    CC:      Chief Complaint   Patient presents with     Consult     pt c/o shortness of breath-pt states that it has been going on for 2 year in September 2017, she had knee surgery in September of 2015 and 2 wks later she developed blood clots in her lungs and ever since then she hasn't been able to breathe the same, she said that they have done another CT scan and they clots are now gone, but she now has shortness of breath anytime that she is exerting herself-she says that it is really bad if she is carrying things, steps are bad for her breathing, or rushing       HPI:       Desi Vega is an 67 y.o. female who presents for evaluation of dyspnea.  Patient has history of HTN, sick sinus syndrome s/p pacemaker, OA s/p bilateral knee surgeries 2014 and 2015, pulmonary embolism post knee surgery 9/2015 finished 6 months anticoagulation.    Patient complains of exertional dyspnea for several months.  Patient was evaluated by cardiology, underwent exercise echocardiogram on 9/2016 showed normal EF, no valvular disease, no wall motion abnormalities. Chest CT scan PE protocol don digna 7/2016 was negative for PE, no parenchymal disease. Coronary CTA scan done on 4/11/2017 showed normal coronaries, negative extracardiac findings. Adequate function of pacemaker.   Patient was referred to pulmonary for further evaluation.   Reports difficulty walking over one block before stopping, denies cough or wheezes.   Patient denies outdoors allergies, denies being diagnosed with asthma, denies tobacco use in the past.   Denies chest pain. Denies orthopnea or PND. Denies swelling of LEs. Since knee surgery her activity has declined. Denies significant changes in her weight.   She is still dealing with knee pain with ambulation.   No sleeping problems, refresh in AM.   No tobacco use in the past.    Past Medical History:   Diagnosis Date     Anhedonia      Arthritis      Diarrhea     thought to be due to gastric bypass     Grief reaction      History of palpitations      Hyperlipidemia      Insomnia      Osteopenia      Osteopenia      Shortness of breath      Sick sinus syndrome     bradycardia - pacemaker placed     TGA (transient global amnesia) 2004    Transient global amnesia - on plavix     Tinnitus      Urinary frequency     with some leakage     Medications:     Prior to Admission medications    Medication Sig Start Date End Date Taking? Authorizing Provider   ascorbic acid, vitamin C, (VITAMIN C) 100 MG tablet Take 100 mg by mouth daily.   Yes PROVIDER, HISTORICAL   biotin 2,500 mcg cap Take 1 capsule by mouth daily.    Yes PROVIDER, HISTORICAL   cholecalciferol, vitamin D3, 5,000 unit Tab Take 5,000 Units by mouth every morning.    Yes PROVIDER, HISTORICAL   clopidogrel (PLAVIX) 75 mg tablet Take 1 tablet (75 mg total) by mouth daily. 8/23/16  Yes Roz Acevedo MD   coconut oil 1,000 mg cap Take 2 capsules by  "mouth daily.    Yes PROVIDER, HISTORICAL   ketoconazole (NIZORAL) 2 % cream as needed.  3/4/16  Yes PROVIDER, HISTORICAL   losartan (COZAAR) 25 MG tablet Take 1 tablet (25 mg total) by mouth daily. 3/24/17  Yes Orlin Wells MD   lutein 20 mg cap Take 20 mg by mouth every morning.    Yes PROVIDER, HISTORICAL   multivitamin therapeutic (THERAGRAN) tablet Take 1 tablet by mouth daily.   Yes PROVIDER, HISTORICAL   zolpidem (AMBIEN) 5 MG tablet Take 1 tablet (5 mg total) by mouth bedtime as needed for sleep. 2/6/17  Yes Roz Acevedo MD   amoxicillin (AMOXIL) 500 MG capsule Predental 2/29/16   PROVIDER, HISTORICAL   benzonatate (TESSALON PERLES) 100 MG capsule Take 1 capsule (100 mg total) by mouth every 6 (six) hours as needed for cough. 10/12/16 10/12/17  Roz Acevedo MD     Social History     Social History     Marital status:      Spouse name: N/A     Number of children: N/A     Years of education: N/A     Occupational History     Not on file.     Social History Main Topics     Smoking status: Never Smoker     Smokeless tobacco: Not on file     Alcohol use Yes      Comment: rare     Drug use: No     Sexual activity: Not on file     Other Topics Concern     Not on file     Social History Narrative     Family History   Problem Relation Age of Onset     Heart attack Father      Diabetes Maternal Grandmother      Diabetes Brother      Review of Systems  A 12 point comprehensive review of systems was negative except as noted.     Objective:     Vitals:    06/12/17 1431   BP: 136/84   Pulse: 68   Resp: 16   SpO2: 100%   Weight: 167 lb 4.8 oz (75.9 kg)   Height: 5' 4.5\" (1.638 m)   SpO2 at rest on RA is 99%.   SpO2 after ambulating 150ft on RA is 98%.    Gen: awake, alert, no distress  HEENT: pink conjunctiva, moist mucosa, Mallampati II/IV  Neck: no thyromegaly, masses or JVD  Lungs: clear  CV: regular, no murmurs or gallops appreciated  Abdomen: soft, NT, BS wnl  Ext: no edema  Neuro: CN " II-XII intact, non focal      Diagnostic tests:   PFTs (6/12/2017)  PFTs (6/12/2017)  FVC  2.53 L (84%)  FEV1  2.00 L (86%)  FEV1 / FVC 79  No significant post bronchodilator response  TLC  4.66 L (91%)  RV  2.12 L (101%)  DLCO corrected Hg 106%    Calcium coronary CT scan (4/11/2017)    Left Main   The vessel and its major branches are widely patent without any detectable stenosis or plaque.      Left Anterior Descending   The vessel and its major branches are widely patent without any detectable stenosis or plaque.      Left Circumflex   The vessel and its major branches are widely patent without any detectable stenosis or plaque.      Right Coronary Artery   The vessel and its major branches are widely patent without any detectable stenosis or plaque.        Left Ventricle. No left ventricular mass or thrombus.   Right Ventricle. Pacer wire present in the ventricle.   Left Atrium. No left atrial mass or thrombus.   Aorta. Normal caliber of the visualized proximal ascending aorta. Normal sized aortic root.   Aortic Valve . Normal   Tricuspid aortic valve. Normal   Pulmonary Artery. Normal pulmonary artery and venous anatomy. All pulmonary veins draining into the left atrium.   Pericardium. Normal pericardial thickness.No pericardial effusion.     EXTRACARDIAC OVERREAD OF CARDIAC CT 4/11/2017 11:21 AM  COMPARISON: 7/7/2016  LIMITED CHEST: Negative.  LIMITED MEDIASTINUM: Negative.  LIMITED UPPER ABDOMEN: Negative.  CONCLUSION:    1.  No significant incidental extracardiac findings.    Stress echo:  Procedure Date  Date: 09/02/2016 Start: 09:15 AM  Summary  Normal left ventricular size and lower limits of normal to mildly reduced  systolic function.  Left ventricular ejection fraction is visually estimated to be 45-50%.  Normal exercise stress echocardiogram    No chest pain with exercise.  Exercise capacity is appropriate for age.  ECG portion of stress test is negative for ischemia.  Pacemaker noted in the RA and  RV    CTA CHEST PE RUN 7/7/2016 12:21 PM  INDICATION: sob  COMPARISON: June 2013 CT.  ANGIOGRAM CHEST: No evidence pulmonary embolism, aortic dissection, or coronary atherosclerosis.  LUNGS AND PLEURA: Stable left apical 3 mm nodule (series 3, image 110). No infiltrate or effusion.  MEDIASTINUM: No adenopathy.  LIMITED UPPER ABDOMEN: Cholecystectomy.  MUSCULOSKELETAL: Negative.  CONCLUSION:  1.  No evidence pulmonary embolism.  2.  Stable left apical 3 mm lung nodule is benign by time criteria.    CTA CHEST PE RUN 9/22/2015 7:02 PM  INDICATION: sob, s/p total knee replacement  COMPARISON: 06/11/2013.  ANGIOGRAM CHEST: There are bilateral pulmonary emboli with filling defects seen in the right upper lobe segmental branches, right interlobar artery and right middle lobe and lower lobe segmental branches. There are filling defects in the left upper lobe and left lower lobe segmental arterial branches. The embolic burden is greater on the right than left. There is no evidence for thoracic aortic aneurysm or thoracic aortic dissection.  LUNGS AND PLEURA: The lungs are clear of focal infiltrates. No pulmonary nodule or mass. No pneumothorax or pleural effusion.  MEDIASTINUM: There is no mediastinal or hilar lymphadenopathy. There is no pericardial effusion. No coronary artery calcification. There is pacemaker with leads over the right atrium and right ventricle.  LIMITED UPPER ABDOMEN: Patient is status post cholecystectomy and gastric surgery. Visualized upper abdomen is otherwise negative.  MUSCULOSKELETAL: Negative.  CONCLUSION:  1.  Bilateral pulmonary emboli.

## 2021-06-12 NOTE — TELEPHONE ENCOUNTER
Results and recommendations discussed with pt.  Pt verbalized understanding and had no questions.  -stephanie    Primary phone # updated in chart.  -stephanie

## 2021-06-12 NOTE — TELEPHONE ENCOUNTER
----- Message from Heidi Vivar sent at 10/16/2020  9:45 AM CDT -----    Caller: Patient  Primary cardiologist: Dr. Wells    Detailed reason for call: Patient was wondering if there are any results yet for her Echo    Best phone number: [phone]  Best time to contact: today  Ok to leave a detailed message? yes  Device? yes

## 2021-06-13 NOTE — PROGRESS NOTES
Assessment/ Plan     1. Shingles  Patient symptoms and exam are consistent with shingles.  The rash is near her left lateral thigh so recommend she get in for an eye exam in the next 24-48 hours.  Will put her on Valtrex 1000 3 times daily for 7 days.  Discussed this could shorten the course and make it less severe, but is not a cure.  Discussed possibility of postherpetic neuralgia.  Recommend that she stay away from infants under 1.  She will watch for secondary causes of infection let us know if she has increased redness or swelling.  Patient was quite shocked that this is shingles given the fact that she has had the shingles vaccine.  Reassurance given to her that her ear exam is completely normal.  - valACYclovir (VALTREX) 1000 MG tablet; Take 1 tablet (1,000 mg total) by mouth 3 (three) times a day for 7 days.  Dispense: 21 tablet; Refill: 0      Subjective:       Desi Vega is a 67 y.o. female who presents for evaluation of ear pain.  Patient states that 4 days ago she started to have an earache.  It became very severe and radiated up the side of her head and into her jaw.  It was hard for her to open her mouth.  She felt a little bit of a sore throat.  She has not had any other symptoms until late yesterday she developed a little rash near her left eye.  This morning when she woke up it looks like 2 tiny blisters.  She has had a little bit of watering of the eye, but no pain or irritation.  She had her shingles vaccine about 7 or 8 years ago.  Had no recent dental work or exposures to illnesses.        Relevant past medical, family, surgical, and social history reviewed with patient, unless noted in HPI, not pertinent for this visit.    Review of Systems   A 12 point comprehensive review of systems was negative except as noted.      Current Outpatient Prescriptions   Medication Sig Dispense Refill     amoxicillin (AMOXIL) 500 MG capsule Predental       biotin 2,500 mcg cap Take 1 capsule by mouth  "daily.        cholecalciferol, vitamin D3, 5,000 unit Tab Take 5,000 Units by mouth every morning.        clopidogrel (PLAVIX) 75 mg tablet Take 1 tablet (75 mg total) by mouth daily. 90 tablet 3     ketoconazole (NIZORAL) 2 % cream as needed.   1     losartan (COZAAR) 25 MG tablet Take 1 tablet (25 mg total) by mouth daily. 90 tablet 4     lutein 20 mg cap Take 20 mg by mouth every morning.        multivitamin therapeutic (THERAGRAN) tablet Take 1 tablet by mouth daily.       zolpidem (AMBIEN) 5 MG tablet Take 1 tablet (5 mg total) by mouth at bedtime as needed for sleep. 90 tablet 1     valACYclovir (VALTREX) 1000 MG tablet Take 1 tablet (1,000 mg total) by mouth 3 (three) times a day for 7 days. 21 tablet 0     No current facility-administered medications for this visit.        Objective:      /68  Pulse 72  Temp 97.6  F (36.4  C) (Oral)   Resp 14  Ht 5' 4\" (1.626 m)  Wt 168 lb (76.2 kg)  LMP 07/29/1988  BMI 28.84 kg/m2      General appearance: alert, appears stated age and cooperative  Head: Normocephalic, without obvious abnormality, atraumatic  Eyes: conjunctivae/corneas clear. PERRL, EOM's intact.   Ears: normal TM's and external ear canals both ears  Nose: Nares normal. Septum midline. Mucosa normal. No drainage or sinus tenderness.  Throat: lips, mucosa, and tongue normal; teeth and gums normal  Neck: no adenopath  Lungs: clear to auscultation bilaterally  Heart: regular rate and rhythm, S1, S2 normal, no murmur, click, rub or gallop  Skin: 2 small vesicles on erythematous base just lateral and inferior to the left eye.    No results found for this or any previous visit (from the past 168 hour(s)).       This note has been dictated using voice recognition software. Any grammatical or context distortions are unintentional and inherent to the software  "

## 2021-06-13 NOTE — PROGRESS NOTES
[facility] Progress Note    Assessment:  1.  Chronic dyspnea on exertion which is persistent but does sound improved.  She has been active and participating in water aerobics classes.  The stress echocardiogram from 1 year ago as outlined.  The CT angiogram demonstrated no obstructive coronary artery disease.  She is encouraged to continue with regular exercise program.    2.  Hypertension.  Blood pressure appears to be controlled on her current dose of losartan.  Her laboratory studies are stable.    3.  Sick sinus syndrome with pacemaker.  Pacemaker report reviewed from July 2017 and will be repeated October 2017.  She has a very small burden of atrial fibrillation which will need to be monitored.        Plan: As outlined above with follow-up in 1 year.    1. Cardiac pacemaker in situ, dual chamber     2. Essential hypertension           An After Visit Summary was printed and given to the patient.    Subjective:    Desi Vega is a 67 y.o. female who returned for a planned  follow up visit.  She reports overall she feels well.  She still continues to be worsened dyspnea with exertion.  However she indicates that she participates in 245 minute water aerobic classes regularly.  She is not describing chest pain.  She does not describe orthopnea or PND.  Recent laboratory studies from her primary care physician and the notes from her primary care physician are reviewed.  She did undergo CT angiography 2017 that demonstrated no significant obstructive coronary artery disease.  Pacemaker checks have demonstrated a very small burden of atrial fibrillation with repeat pacemaker check planned for next month.    Review of Systems:   General: WNL  Eyes: WNL  Ears/Nose/Throat: WNL  Lungs: WNL  Heart: WNL  Stomach: WNL  Bladder: WNL  Muscle/Joints: WNL  Skin: WNL  Nervous System: WNL  Mental Health: WNL     Blood: WNL      Problem List:    Patient Active Problem List   Diagnosis     Subjective  Tinnitus     Symptomatic Menopause     Cervicalgia     Osteopenia     Ganglion Of The Left Foot     Sick Sinus Syndrome     Insomnia     Backache     Grief Reaction     Loss Of Pleasure From Usual Activities (Anhedonia)     Sinusitis     S/P total knee arthroplasty     Sick sinus syndrome     TGA (transient global amnesia)     Acute blood loss anemia     H/O gastric bypass     Cardiac pacemaker in situ, dual chamber     Bilateral pulmonary embolism     Pulmonary embolism     Hypertension       Social History     Social History     Marital status:      Spouse name: N/A     Number of children: N/A     Years of education: N/A     Occupational History     Not on file.     Social History Main Topics     Smoking status: Never Smoker     Smokeless tobacco: Not on file     Alcohol use Yes      Comment: rare     Drug use: No     Sexual activity: Not on file     Other Topics Concern     Not on file     Social History Narrative       Family History   Problem Relation Age of Onset     Heart attack Father      Diabetes Maternal Grandmother      Diabetes Brother        Current Outpatient Prescriptions   Medication Sig Dispense Refill     biotin 2,500 mcg cap Take 1 capsule by mouth daily.        cholecalciferol, vitamin D3, 5,000 unit Tab Take 5,000 Units by mouth every morning.        clopidogrel (PLAVIX) 75 mg tablet Take 1 tablet (75 mg total) by mouth daily. 90 tablet 3     coconut oil 1,000 mg cap Take 2 capsules by mouth daily.        losartan (COZAAR) 25 MG tablet Take 1 tablet (25 mg total) by mouth daily. 90 tablet 4     lutein 20 mg cap Take 20 mg by mouth every morning.        multivitamin therapeutic (THERAGRAN) tablet Take 1 tablet by mouth daily.       zolpidem (AMBIEN) 5 MG tablet Take 1 tablet (5 mg total) by mouth at bedtime as needed for sleep. 90 tablet 1     amoxicillin (AMOXIL) 500 MG capsule Predental       benzonatate (TESSALON PERLES) 100 MG capsule Take 1 capsule (100 mg total) by mouth every 6  "(six) hours as needed for cough. 30 capsule 0     ketoconazole (NIZORAL) 2 % cream as needed.   1     No current facility-administered medications for this visit.        Objective:     /72 (Patient Site: Left Arm, Patient Position: Sitting, Cuff Size: Adult Regular)  Pulse 86  Resp 16  Ht 5' 4\" (1.626 m)  Wt 166 lb (75.3 kg)  LMP 1988  SpO2 97%  BMI 28.49 kg/m2  166 lb (75.3 kg)       Physical Exam:    GENERAL APPEARANCE: alert, no apparent distress  HEENT: no scleral icterus or xanthelasma  NECK: jugular venous pressure within normal limits.  CHEST: symmetric, the lungs are clear to auscultation, pacemaker site well-healed.  CARDIOVASCULAR: regular rhythm without murmur, click, or gallop; no carotid bruits  Abdomen: No Organomegaly, masses, bruits, or tenderness. Bowels sounds are present      EXTREMITIES: no cyanosis, clubbing or edema    Cardiac Testing:  t  Study date: 17         Patient Information      Patient Name MRN Sex              Age     Desi Vega [id number] Female 1950 (67 y.o.)       Indications      Dyspnea, cardiac origin suspected     Dx: Other nonspecific abnormal cardiovascular system function study [R94.39 (ICD-10-CM)]       Interpretation Summary        Normal coronary anatomy with no significant coronary artery disease detected in this study.   Procedure Date  Date: 2016 Start: 09:15 AM     Study Location: [facility]  Technical Quality: Limited visualization due to poor acoustical window.     Patient Status: Routine     Contrast Medium: Definity. Used - 7 mland Wasted - 3 ml     Height: 64 inches Weight: 166 pounds BSA: 1.81 m^2 BMI: 28.49 kg/m^2     HR: 96 bpm BP: 122/90 mmHg     Indications  Dyspnea.      Conclusions       Summary   No chest pain with exercise.   Exercise capacity is appropriate for age.   ECG portion of stress test is negative for ischemia.   Normal exercise stress echocardiogram.    HOLTER MONITOR REPORT     INTERPRETATION:  " 01/28/2016     INTERPRETATION:  Predominant rhythm is an atrial paced rhythm.  The patient has a  normally functioning dual-chamber pacemaker.  Rare ventricular pacing with fusion is  noted.  Rates ranged from 60 beats per minute to 140 beats per minute.  Rare atrial  premature beats were noted, 12 over 24 hours.  Occasional ventricular premature  beats were noted, 241 over 24 hours.  The patient noted shortness of breath  associated with sinus rhythm, one at a rate of 117 beats per minute and the second  at a rate of 70 beats per minute.     CONCLUSION:  Normally functioning dual-chamber pacemaker with predominant rhythm an  atrial paced rhythm, otherwise normal Holter.        JACKY Gaytan 01/28/2016 15:36:11  T 01/28/2016 16:06:39  R 01/28/2016 16:06:39  [id number]       Lab Results:    Lab Results   Component Value Date     07/17/2017    K 4.7 07/17/2017     07/17/2017    CO2 25 07/17/2017    BUN 20 07/17/2017    CREATININE 0.79 07/17/2017    CALCIUM 9.5 07/17/2017     Lab Results   Component Value Date    CHOL 204 (H) 07/17/2017    TRIG 75 07/17/2017    HDL 78 07/17/2017     BNP (pg/mL)   Date Value   01/20/2016 13   11/18/2015 48   09/22/2015 259 (H)     Creatinine (mg/dL)   Date Value   07/17/2017 0.79   07/07/2016 0.83   07/01/2016 0.78   03/18/2016 0.72     LDL Calculated (mg/dL)   Date Value   07/17/2017 111   03/18/2016 104   06/16/2014 136 (H)     Lab Results   Component Value Date    WBC 5.4 07/17/2017    WBC 7.4 09/23/2015    HGB 10.9 (L) 07/17/2017    HCT 34.6 (L) 07/17/2017    MCV 75 (L) 07/17/2017     07/17/2017               This note has been dictated using voice recognition software. Any grammatical or context distortions are unintentional and inherent to the software.      Orlin Wells M.D., F.A.C.C.  [facility]  [phone]

## 2021-06-14 NOTE — PROGRESS NOTES
Assessment/Plan:    Desi Vega is a 67 y.o. female presenting for:    1. Other insomnia  Refill of Ambien sent to the pharmacy  - zolpidem (AMBIEN) 5 MG tablet; Take 1 tablet (5 mg total) by mouth at bedtime as needed for sleep.  Dispense: 90 tablet; Refill: 1    2. Onychomycosis  Refill of ciclopirox given.  - ciclopirox (PENLAC) 8 % solution; Apply to affected area daily  Dispense: 19.8 mL; Refill: 1    3. Iron deficiency  I do wonder if her nail changes as well as her hair changes are due to her iron deficiency.  It is possible that she might need an iron infusion which she has had in the past.  We will have her take a iron supplement for the next several months and return in 3 months for a recheck.  I would not be surprised if her iron levels do not change much given her history of gastric bypass at which point we will need to proceed with iron infusions which the patient is not thrilled about.  - Iron and Transferrin Iron Binding Capacity; Future  - HM2(CBC w/o Differential); Future    In the meantime for her hair loss she certainly could try minoxidil foam and I discussed this with her.  It is over-the-counter but I am happy to prescribe as well.  I would be doubtful that insurance would pay for.  She will discuss with the dermatologist when she sees them for her skin checkup next week.    Medications Discontinued During This Encounter   Medication Reason     ciclopirox (PENLAC) 8 % solution Reorder     zolpidem (AMBIEN) 5 MG tablet Reorder           Chief Complaint:  Chief Complaint   Patient presents with     Alopecia     Has been going on for a while but is really bad now     Nail Problem     Toenail fungus and brittle yellow finger nails     Medication Refill     90 day supply with 1 refill for her sleep med and needs a refill on the fungus med       Subjective:   Desi Vega is a pleasant 67-year-old female presenting to the clinic today for several concerns:    #1.  Hair loss:  Patient notes for the last 6-8 months she has been experiencing more than average hair loss.  She states that she feels as though she has lost about half of her hair.  There are no specific patches that are missing just a generalized hair loss.  She was diagnosed with iron deficiency anemia about 3 months ago with a fairly low iron level.  She has a history of gastric bypass surgery.  She had a normal vitamin D, vitamin B12 and TSH level.  She cannot think of any incredibly stressful events that have occurred in the last 6-8 months.  I recommended that she take some iron supplements that she was fairly adverse to getting iron infusions however she has not started doing that yet.  She wonders what supplements to take.  She also notes that her nails are somewhat brittle and thin and has discussed this with dermatology in the past was put on a biotin supplement which she is taking.    2.  Onychomycosis: Patient is using ciclopirox solution.  She is hopeful to get a refill.    3.  Insomnia: Patient would like a refill of her Ambien.  She takes this every night.    She also mentions that she has a history of recent shingles on her left side of her face.  She did see an eye physician who reassured her that it was not in her eye.  She continues to have some mild ear pain and would like this looked at today.    12 point review of systems completed and negative except for what has been described above    History   Smoking Status     Never Smoker   Smokeless Tobacco     Not on file       Current Outpatient Prescriptions   Medication Sig     amoxicillin (AMOXIL) 500 MG capsule Predental     biotin 2,500 mcg cap Take 1 capsule by mouth daily.      cholecalciferol, vitamin D3, 5,000 unit Tab Take 5,000 Units by mouth every morning.      ciclopirox (PENLAC) 8 % solution Apply to affected area daily     clopidogrel (PLAVIX) 75 mg tablet Take 1 tablet (75 mg total) by mouth daily.     ketoconazole (NIZORAL) 2 % cream Apply  "topically as needed.     losartan (COZAAR) 25 MG tablet Take 1 tablet (25 mg total) by mouth daily.     lutein 20 mg cap Take 20 mg by mouth every morning.      multivitamin therapeutic (THERAGRAN) tablet Take 1 tablet by mouth daily.     traMADol (ULTRAM) 50 mg tablet Take 1 tablet (50 mg total) by mouth every 6 (six) hours as needed for pain.     zolpidem (AMBIEN) 5 MG tablet Take 1 tablet (5 mg total) by mouth at bedtime as needed for sleep.         Objective:  Vitals:    11/21/17 1017   BP: 120/80   Pulse: 76   Resp: 16   Temp: 97.3  F (36.3  C)   TempSrc: Oral   Weight: 167 lb 6 oz (75.9 kg)   Height: 5' 4\" (1.626 m)       Vital signs reviewed and stable  General: No acute distress  Psych: Appropriate affect  HEENT: moist mucous membranes, pupils equal, round, reactive to light and accomodation, posterior oropharynx clear of erythema or exudate, tympanic membranes are pearly grey bilaterally  Lymph: no cervical or supraclavicular lymphadenopathy  Cardiovascular: regular rate and rhythm with no murmur  Pulmonary: clear to auscultation bilaterally with no wheeze  Abdomen: soft, non tender, non distended with normo-active bowel sounds  Extremities: warm and well perfused with no edema  Skin: warm and dry with no rash, onychomycosis to the great toe bilaterally, posttest on the hair is negative.  No patches of hair that are missing.  Generalized thinning on the top         This note has been dictated and transcribed using voice recognition software.   Any errors in transcription are unintentional and inherent to the software.  "

## 2021-06-16 NOTE — TELEPHONE ENCOUNTER
Telephone Encounter by Flakita Staples at 3/11/2019 10:08 AM     Author: Flakita Staples Service: -- Author Type: --    Filed: 3/11/2019 10:09 AM Encounter Date: 3/11/2019 Status: Signed    : Flakita Staples       Per insurance, medication was denied within the last 60 days. Medication will need to be appealed. PA team did not submit original request so clinic will be responsible for all follow ups with insurance.

## 2021-06-16 NOTE — TELEPHONE ENCOUNTER
Telephone Encounter by Flakita Staples at 3/18/2019  9:16 AM     Author: Flakita Staples Service: -- Author Type: --    Filed: 3/18/2019  9:17 AM Encounter Date: 3/11/2019 Status: Signed    : Flakita Staples APPROVED:    Approval start date: 3/16/19  Approval end date: 3/16/21    Pharmacy has been notified of approval and will contact patient when medication is ready for pickup.

## 2021-06-16 NOTE — PROGRESS NOTES
Assessment/ Plan     1. Itching in the vaginal area  Patient is having severe itching in the labial area.  No signs of fungal infection.  We will have her try a topical steroid twice daily for 2 weeks.  Warned not to use this longer than that as it can cause some thinning of the skin.  - betamethasone valerate (VALISONE) 0.1 % cream; Apply topically 2 (two) times a day.  Dispense: 30 g; Refill: 1    2. Incontinence  Patient is struggling with ongoing incontinence.  She has had a prior bladder sling procedure.  She does have a little bit of prolapse.  She may be interested in a pessary which is what her sister does.  She will let us know if she wants referral to OB/GYN.      Subjective:       Desi Vega is a 67 y.o. female who presents for itching in the vaginal area.  Patient states for the last 2 weeks she has had a very itchy area on the right labia.  She has been scratching it so hard that she is caused a couple excoriations.  When she was looking in the mirror the other day she saw something protruding out of the vagina.  She states it was somewhat red and she was not sure what it was.  She does have a history of prolapse and had a bladder sling many years ago.  She has noticed she has had worsening incontinence.  She had some complications related to that surgery so is not very excited about having another surgery.  Her sister uses a pessary and she is wondering if that is an option for her.  I discussed that we could certainly have her see OB/GYN and they can do that for her.  She is planning on going to [place] so will let us know when she gets back if she wants that referral.    Relevant past medical, family, surgical, and social history reviewed with patient, unless noted in HPI, not pertinent for this visit.    Review of Systems   A 12 point comprehensive review of systems was negative except as noted.      Current Outpatient Prescriptions   Medication Sig Dispense Refill     amoxicillin  "(AMOXIL) 500 MG capsule Predental       biotin 2,500 mcg cap Take 1 capsule by mouth daily.        cholecalciferol, vitamin D3, 5,000 unit Tab Take 5,000 Units by mouth every morning.        clopidogrel (PLAVIX) 75 mg tablet Take 1 tablet (75 mg total) by mouth daily. 90 tablet 3     fluorouracil (EFUDEX) 5 % cream   0     ketoconazole (NIZORAL) 2 % cream Apply topically as needed. 30 g 0     losartan (COZAAR) 25 MG tablet Take 1 tablet (25 mg total) by mouth daily. 90 tablet 2     lutein 20 mg cap Take 20 mg by mouth every morning.        multivitamin therapeutic (THERAGRAN) tablet Take 1 tablet by mouth daily.       traMADol (ULTRAM) 50 mg tablet Take 1 tablet (50 mg total) by mouth every 6 (six) hours as needed for pain. 20 tablet 0     triamcinolone (KENALOG) 0.5 % ointment JOSHUA THIN LAYER EXT AA 1 TO 2 TIMES QD  0     zolpidem (AMBIEN) 5 MG tablet Take 1 tablet (5 mg total) by mouth at bedtime as needed for sleep. 90 tablet 1     betamethasone valerate (VALISONE) 0.1 % cream Apply topically 2 (two) times a day. 30 g 1     No current facility-administered medications for this visit.        Objective:      /68  Pulse 62  Temp 97.8  F (36.6  C) (Oral)   Resp 14  Ht 5' 4\" (1.626 m)  Wt 169 lb (76.7 kg)  LMP 07/29/1988  BMI 29.01 kg/m2      General appearance: alert, appears stated age and cooperative   exam: Excoriations along the right labial majora, no surrounding erythema or signs of yeast.  There is no discharge.  In the introitus I can see the urethra with a very small polyp.  I think this may be what the patient was seen when she was looking in the mirror.  No other lumps or bumps noted.    Recent Results (from the past 168 hour(s))   QTF-Mycobacterium tuberculosis by QuantiFERON-TB Gold   Result Value Ref Range    QTF RESULT Negative Negative    QTF INTREPRETATION       No interferon-gamma response to M. tuberculosis antigens was detected.  Infecton with M. tuberculosis is unlikely.  A negative " result alone does not exclude infection with M. tuberculosis    QTF NIL 0.06 IU/mL    QTF TB ANTIGEN - NIL 0.02 IU/mL    QTF MITOGEN - NIL >10.00 IU/mL          This note has been dictated using voice recognition software. Any grammatical or context distortions are unintentional and inherent to the software

## 2021-06-17 NOTE — PROGRESS NOTES
Assessment/Plan:    Desi Vega is a 68 y.o. female presenting for:    1. Cough  She is having a continued cough here in clinic.  I encouraged her to use Tessalon Perles as well as Mucinex.  I did send albuterol to the pharmacy in hopes that this may help with her coughing as well.  She does not sound particularly wheezy currently and her oxygen level was normal so I did tell her if she is unable to pick this up she could certainly try just the Tessalon Perles and Mucinex for a few days.  She will consider this and let me know if she continues to have issues.  She is not really having any systemic symptoms anymore so I do not think that she needs another course of antibiotics.  We will plan on repeating the chest x-ray in about a month.  - albuterol (PROAIR HFA;PROVENTIL HFA;VENTOLIN HFA) 90 mcg/actuation inhaler; Inhale 2 puffs every 6 (six) hours as needed for wheezing. Or coughing  Dispense: 1 each; Refill: 0  - benzonatate (TESSALON PERLES) 100 MG capsule; Take 1 capsule (100 mg total) by mouth 3 (three) times a day as needed for cough.  Dispense: 30 capsule; Refill: 1    2. TGA (transient global amnesia)  Refill of Plavix and to the pharmacy  - clopidogrel (PLAVIX) 75 mg tablet; Take 1 tablet (75 mg total) by mouth daily.  Dispense: 90 tablet; Refill: 3        Medications Discontinued During This Encounter   Medication Reason     traMADol (ULTRAM) 50 mg tablet Therapy completed     amoxicillin-clavulanate (AUGMENTIN) 875-125 mg per tablet Therapy completed     clopidogrel (PLAVIX) 75 mg tablet Reorder           Chief Complaint:  Chief Complaint   Patient presents with     Cough     Sxs x 2wks- was on abx Augmentin x 10 days bid- just finished on Sunday- still not better       Subjective:   Desi Vega is a pleasant 68-year-old female presenting to the clinic today with concerns over a cough.  The patient began getting sick about 3-1/2 weeks ago.  For the first 10 days she tried to treat on  her own.  She was then seen in clinic with some sinus pressure and a cough.  An x-ray was a bit concerning for an opacity in the left lower lobe.  She started on Augmentin and she has tolerated this well in the past.  She states that she overall felt better on the medication but she continues to cough.  She states that she feels as though she has some phlegm in her upper chest but has a difficult time getting it out.  She states the cough is interestingly better at night when she lays down but worse throughout the day.  She does not think that this is due to allergies.  When she was on the Tessalon Perles it seemed to help but she is not on them anymore.    She has been eating and drinking well.  No shortness of breath.    12 point review of systems completed and negative except for what has been described above    History   Smoking Status     Never Smoker   Smokeless Tobacco     Never Used       Current Outpatient Prescriptions   Medication Sig Note     amoxicillin (AMOXIL) 500 MG capsule Predental      betamethasone valerate (VALISONE) 0.1 % cream Apply topically 2 (two) times a day.      biotin 2,500 mcg cap Take 1 capsule by mouth daily.       cholecalciferol, vitamin D3, 5,000 unit Tab Take 5,000 Units by mouth every morning.       clopidogrel (PLAVIX) 75 mg tablet Take 1 tablet (75 mg total) by mouth daily.      codeine-guaiFENesin (CODEINE-GUAIFENESIN)  mg/5 mL liquid Take 5 mL by mouth 3 (three) times a day as needed for cough.      fluorouracil (EFUDEX) 5 % cream  2/22/2018: Received from: External Pharmacy     ketoconazole (NIZORAL) 2 % cream Apply topically as needed.      losartan (COZAAR) 25 MG tablet Take 1 tablet (25 mg total) by mouth daily.      lutein 20 mg cap Take 20 mg by mouth every morning.       multivitamin therapeutic (THERAGRAN) tablet Take 1 tablet by mouth daily.      triamcinolone (KENALOG) 0.5 % ointment JOSHUA THIN LAYER EXT AA 1 TO 2 TIMES QD 2/22/2018: Received from: External  "Pharmacy     zolpidem (AMBIEN) 5 MG tablet Take 1 tablet (5 mg total) by mouth at bedtime as needed for sleep.      albuterol (PROAIR HFA;PROVENTIL HFA;VENTOLIN HFA) 90 mcg/actuation inhaler Inhale 2 puffs every 6 (six) hours as needed for wheezing. Or coughing      benzonatate (TESSALON PERLES) 100 MG capsule Take 1 capsule (100 mg total) by mouth 3 (three) times a day as needed for cough.          Objective:  Vitals:    05/08/18 1014   BP: 134/78   Pulse: 72   Resp: 20   Temp: 97.9  F (36.6  C)   TempSrc: Oral   SpO2: 99%   Weight: 166 lb 5 oz (75.4 kg)   Height: 5' 4\" (1.626 m)       Vital signs reviewed and stable  General: No acute distress  Psych: Appropriate affect  HEENT: moist mucous membranes, pupils equal, round, reactive to light and accomodation, posterior oropharynx clear of erythema or exudate, tympanic membranes are pearly grey bilaterally  Lymph: no cervical or supraclavicular lymphadenopathy  Cardiovascular: regular rate and rhythm with no murmur  Pulmonary: clear to auscultation bilaterally with no wheeze  Abdomen: soft, non tender, non distended with normo-active bowel sounds  Extremities: warm and well perfused with no edema  Skin: warm and dry with no rash         This note has been dictated and transcribed using voice recognition software.   Any errors in transcription are unintentional and inherent to the software.  "

## 2021-06-17 NOTE — PROGRESS NOTES
Assessment/ Plan     1. Cough  Patient has a severe cough with possible pneumonia seen in the posterior lung area.  As she states Augmentin worked well for her last year when she had similar symptoms, she would like to use that again.  Reviewed potential side effects including GI upset and diarrhea.  She was also given a refill of the Tessalon Perles and a prescription of Robitussin with codeine to take at bedtime.  Warned not mixing this with alcohol or drive while using this product.  Recommend she follow-up in 6-8 weeks for repeat chest x-ray to see if that clears or if it is possibly scarring.  Recommend sooner follow-up if symptoms are not improving.  - XR Chest 2 Views  - amoxicillin-clavulanate (AUGMENTIN) 875-125 mg per tablet; Take 1 tablet by mouth 2 (two) times a day for 10 days.  Dispense: 20 tablet; Refill: 0  - benzonatate (TESSALON) 100 MG capsule; Take 1 capsule (100 mg total) by mouth 3 (three) times a day as needed for cough.  Dispense: 21 capsule; Refill: 1  - codeine-guaiFENesin (CODEINE-GUAIFENESIN)  mg/5 mL liquid; Take 5 mL by mouth 3 (three) times a day as needed for cough.  Dispense: 118 mL; Refill: 0      Subjective:       Desi Vega is a 67 y.o. female who presents for 6 day history of illness.  Patient states this initially started with nasal congestion and cough.  It seems to have gotten worse over the last couple of days.  The cough is worse during the day and not as bad at night.  She has had sinus pressure, but not a pain.  A couple of nights she is woken up with the chills but has not had a fever.  The cough has been productive.  Her chest feels sore and she does feel like she has some shortness of breath from time to time.  She is a non-smoker and has no history of asthma or wheezing.  When she had this a year and half ago, she was given Tessalon Perles and Augmentin and that really seemed to help her symptoms.    Relevant past medical, family, surgical, and social  "history reviewed with patient, unless noted in HPI, not pertinent for this visit.    Review of Systems   A 12 point comprehensive review of systems was negative except as noted.      Current Outpatient Prescriptions   Medication Sig Dispense Refill     amoxicillin (AMOXIL) 500 MG capsule Predental       betamethasone valerate (VALISONE) 0.1 % cream Apply topically 2 (two) times a day. 30 g 1     biotin 2,500 mcg cap Take 1 capsule by mouth daily.        cholecalciferol, vitamin D3, 5,000 unit Tab Take 5,000 Units by mouth every morning.        clopidogrel (PLAVIX) 75 mg tablet Take 1 tablet (75 mg total) by mouth daily. 90 tablet 0     fluorouracil (EFUDEX) 5 % cream   0     ketoconazole (NIZORAL) 2 % cream Apply topically as needed. 30 g 0     losartan (COZAAR) 25 MG tablet Take 1 tablet (25 mg total) by mouth daily. 90 tablet 2     lutein 20 mg cap Take 20 mg by mouth every morning.        multivitamin therapeutic (THERAGRAN) tablet Take 1 tablet by mouth daily.       triamcinolone (KENALOG) 0.5 % ointment JOSHUA THIN LAYER EXT AA 1 TO 2 TIMES QD  0     zolpidem (AMBIEN) 5 MG tablet Take 1 tablet (5 mg total) by mouth at bedtime as needed for sleep. 90 tablet 1     amoxicillin-clavulanate (AUGMENTIN) 875-125 mg per tablet Take 1 tablet by mouth 2 (two) times a day for 10 days. 20 tablet 0     benzonatate (TESSALON) 100 MG capsule Take 1 capsule (100 mg total) by mouth 3 (three) times a day as needed for cough. 21 capsule 1     codeine-guaiFENesin (CODEINE-GUAIFENESIN)  mg/5 mL liquid Take 5 mL by mouth 3 (three) times a day as needed for cough. 118 mL 0     traMADol (ULTRAM) 50 mg tablet Take 1 tablet (50 mg total) by mouth every 6 (six) hours as needed for pain. 20 tablet 0     No current facility-administered medications for this visit.        Objective:      /80  Pulse 80  Temp 98.5  F (36.9  C)  Resp 12  Ht 5' 4\" (1.626 m)  Wt 167 lb (75.8 kg)  LMP 07/29/1988  SpO2 97%  BMI 28.67 " kg/m2      General appearance: alert, appears stated age and cooperative  Head: Normocephalic, without obvious abnormality, atraumatic  Eyes: conjunctivae/corneas clear.   Ears: normal TM's and external ear canals both ears  Nose: Nares normal. Septum midline. Mucosa normal. No drainage or sinus tenderness.  Throat: lips, mucosa, and tongue normal; teeth and gums normal  Neck: no adenopathy  Lungs: clear to auscultation bilaterally, almost constant cough  Heart: regular rate and rhythm, S1, S2 normal, no murmur, click, rub or gallop    Chest x-ray: Personally reviewed, shows posterior infiltrate, I was able to compare this to the film and the report from 2015.  They commented as well in the same area as either an infiltrate or scarring.  She has had no x-ray in the interim.    No results found for this or any previous visit (from the past 168 hour(s)).       This note has been dictated using voice recognition software. Any grammatical or context distortions are unintentional and inherent to the software

## 2021-06-18 NOTE — PROGRESS NOTES
Assessment/Plan:    Desi Vega is a 68 y.o. female presenting for:    Scab on right ear: This appears to be some sort of avulsed skin likely from her earring.  I encouraged her to use bacitracin on the area 2 or 3 times daily and keep me updated regarding the progress.  We discussed signs and symptoms of infection.      Medications Discontinued During This Encounter   Medication Reason     benzonatate (TESSALON PERLES) 100 MG capsule      betamethasone valerate (VALISONE) 0.1 % cream      codeine-guaiFENesin (CODEINE-GUAIFENESIN)  mg/5 mL liquid      fluorouracil (EFUDEX) 5 % cream      ketoconazole (NIZORAL) 2 % cream      triamcinolone (KENALOG) 0.5 % ointment            Chief Complaint:  Chief Complaint   Patient presents with     Ear Pain     Sore R ear- pimple like bump       Subjective:   Desi Vega is a pleasant 68-year-old female presenting to the clinic today with concerns over right-sided ear pain/bump.  The patient notes that she generally wears markus studs in her ears.  Every 6 months or so she will twist these and take him out washed them.  She states that she twisted than the other day and she had pain in her right ear.  She took the stent out and it was somewhat stuck initially but was unable to remove.  She then notes that there was a flesh-colored bump in the area.  She thought it was a scab and picked at it a bit but then it began to bleed slightly.  She has never noticed this before.  She states that she was able to get the backing off of the earring with no difficulty.    12 point review of systems completed and negative except for what has been described above    History   Smoking Status     Never Smoker   Smokeless Tobacco     Never Used       Current Outpatient Prescriptions   Medication Sig     amoxicillin (AMOXIL) 500 MG capsule Predental     ascorbic acid, vitamin C, (VITAMIN C) 100 MG tablet Take 100 mg by mouth daily.     biotin 2,500 mcg cap Take 1 capsule  "by mouth daily.      cholecalciferol, vitamin D3, 5,000 unit Tab Take 5,000 Units by mouth every morning.      clopidogrel (PLAVIX) 75 mg tablet Take 1 tablet (75 mg total) by mouth daily.     levothyroxine (SYNTHROID, LEVOTHROID) 25 MCG tablet Take 1 tablet (25 mcg total) by mouth daily.     losartan (COZAAR) 25 MG tablet Take 1 tablet (25 mg total) by mouth daily.     lutein 20 mg cap Take 20 mg by mouth every morning.      multivit,stress formula-zinc (B COMPLEX-C-E-ZINC) Tab tablet Take 1 tablet on Monday, Friday     multivitamin therapeutic (THERAGRAN) tablet Take 1 tablet by mouth daily.     zolpidem (AMBIEN) 5 MG tablet Take 1 tablet (5 mg total) by mouth at bedtime as needed for sleep.         Objective:  Vitals:    06/25/18 1540   BP: 110/62   Pulse: 76   Resp: 16   Temp: 98.2  F (36.8  C)   TempSrc: Oral   Weight: 157 lb 2 oz (71.3 kg)   Height: 5' 4\" (1.626 m)       Vital signs reviewed and stable  General: No acute distress  Psych: Appropriate affect  HEENT: moist mucous membranes, pupils equal, round, reactive to light and accomodation, posterior oropharynx clear of erythema or exudate, tympanic membranes are pearly grey bilaterally, there is a flesh-colored papule adjacent to patient's piercing in her right ear.  This does appear to be somewhat scabbed on.  No bleeding currently.  Minimal tenderness with palpation.  No surrounding erythema.  Skin: warm and dry with no rash         This note has been dictated and transcribed using voice recognition software.   Any errors in transcription are unintentional and inherent to the software.  "

## 2021-06-18 NOTE — PROGRESS NOTES
Progress Note    Reason for Visit:  Chief Complaint     Consult          Progress Note:    HPI:    This patient seen with the primary care physician because of symptoms of hair loss dry brittle nails dry skin.    The patient said that she has started the symptoms 5 years ago after she lost her .    She also had gastric bypass.  She does have an anxiety and panic-like attack with ringing of 30 years and tingling of her hands.    She had previous skin cancer and she had a skin burn recently from exposure to the sun.    His sister has glaucoma.    She has no family history of thyroid disease.    She has a pacemaker in 99 because of sinus bradycardia.    She had hysterectomy back in 88 partial hysterectomy and was on HRT for at least 12 years but then she stopped that.    She has diabetes diet-controlled which which improved by the gastric bypass.    The patient has 2 children.    Thyroid exam is normal.    Component      Latest Ref Rng & Units 3/18/2016 7/1/2016 7/7/2016 7/17/2017   Sodium      136 - 145 mmol/L 143 140 137 142   Anion Gap, Calculation      5 - 18 mmol/L 10 11 16 11   Glucose      70 - 125 mg/dL 98 107 155 (H) 98   Potassium      3.5 - 5.0 mmol/L 4.7 5.1 (H) 3.7 4.7   BUN      8 - 22 mg/dL 15 17 14 20   Calcium      8.5 - 10.5 mg/dL 9.2 9.6 9.7 9.5   Chloride      98 - 107 mmol/L 107 106 100 106   Creatinine      0.60 - 1.10 mg/dL 0.72 0.78 0.83 0.79   CO2      22 - 31 mmol/L 26 23 21 (L) 25   GFR MDRD Non Af Amer      >60 mL/min/1.73m2 >60 >60 >60 >60   GFR MDRD Af Amer      >60 mL/min/1.73m2 >60 >60 >60 >60   TSH      0.30 - 5.00 uIU/mL    2.08   Hemoglobin A1c      4.2 - 6.1 % 6.0   6.0   Vitamin D, Total (25-Hydroxy)      30.0 - 80.0 ng/mL    61.3         Review of Systems:    Nervous System: No headache, dizziness, fainting or memory loss. No tingling sensation of hand or feet.  Ears: No hearing loss or ringing in the ears  Eyes: No blurring of vision, redness, itching or dryness.  Nose: No  nosebleed or loss of smell  Mouth: No mouth sores or loss of taste  Throat: No hoarseness or difficulty swallowing  Neck: No enlarged thyroid or lymph nodes.  Heart: No chest pain, palpitation or irregular heartbeat. No swelling of hands or feet  Lungs: No shortness of breath, cough, night sweats, wheezing or hemoptysis.  Gastrointestinal: No nausea or vomiting, constipation or diarrhea.  No acid reflux, abdominal pain or blood in stools.  Kidney/Bladdr: No polyuria, polydipsia, nocturia or hematuria.  Genital/Sexual: No loss of libido  Skin: No rash, hair loss or hirsutism.  No abnormal striae  Muscles/Joints/Bones: No morning stiffness, muscle aches and pain or loss of height.    Current Medications:  Current Outpatient Prescriptions   Medication Sig     amoxicillin (AMOXIL) 500 MG capsule Predental     ascorbic acid, vitamin C, (VITAMIN C) 100 MG tablet Take 100 mg by mouth daily.     benzonatate (TESSALON PERLES) 100 MG capsule Take 1 capsule (100 mg total) by mouth 3 (three) times a day as needed for cough.     betamethasone valerate (VALISONE) 0.1 % cream Apply topically 2 (two) times a day.     biotin 2,500 mcg cap Take 1 capsule by mouth daily.      cholecalciferol, vitamin D3, 5,000 unit Tab Take 5,000 Units by mouth every morning.      clopidogrel (PLAVIX) 75 mg tablet Take 1 tablet (75 mg total) by mouth daily.     ketoconazole (NIZORAL) 2 % cream Apply topically as needed.     losartan (COZAAR) 25 MG tablet Take 1 tablet (25 mg total) by mouth daily.     lutein 20 mg cap Take 20 mg by mouth every morning.      multivitamin therapeutic (THERAGRAN) tablet Take 1 tablet by mouth daily.     triamcinolone (KENALOG) 0.5 % ointment JOSHUA THIN LAYER EXT AA 1 TO 2 TIMES QD     zolpidem (AMBIEN) 5 MG tablet Take 1 tablet (5 mg total) by mouth at bedtime as needed for sleep.     codeine-guaiFENesin (CODEINE-GUAIFENESIN)  mg/5 mL liquid Take 5 mL by mouth 3 (three) times a day as needed for cough.      fluorouracil (EFUDEX) 5 % cream        Patients Active Problems:  Patient Active Problem List   Diagnosis     Symptomatic Menopause     Osteopenia     Ganglion Of The Left Foot     Sick Sinus Syndrome     Insomnia     Backache     S/P total knee arthroplasty     TGA (transient global amnesia)     H/O gastric bypass     Cardiac pacemaker in situ, dual chamber     Bilateral pulmonary embolism     Pulmonary embolism     Hypertension       History:   reports that she has never smoked. She has never used smokeless tobacco. She reports that she drinks alcohol. She reports that she does not use illicit drugs.   reports that she has never smoked. She has never used smokeless tobacco. She reports that she drinks alcohol. She reports that she does not use illicit drugs.  History   Smoking Status     Never Smoker   Smokeless Tobacco     Never Used      reports that she has never smoked. She has never used smokeless tobacco. She reports that she drinks alcohol. She reports that she does not use illicit drugs.  History   Sexual Activity     Sexual activity: Not on file     Past Medical History:   Diagnosis Date     Anhedonia      Arthritis      Diarrhea     thought to be due to gastric bypass     Grief reaction      History of palpitations      Hyperlipidemia      Insomnia      Osteopenia      Osteopenia      Shortness of breath      Sick sinus syndrome     bradycardia - pacemaker placed     TGA (transient global amnesia) 2004    Transient global amnesia - on plavix     Tinnitus      Urinary frequency     with some leakage     Family History   Problem Relation Age of Onset     Heart attack Father      Diabetes Maternal Grandmother      Diabetes Brother      Past Medical History:   Diagnosis Date     Anhedonia      Arthritis      Diarrhea     thought to be due to gastric bypass     Grief reaction      History of palpitations      Hyperlipidemia      Insomnia      Osteopenia      Osteopenia      Shortness of breath      Sick sinus  "syndrome     bradycardia - pacemaker placed     TGA (transient global amnesia) 2004    Transient global amnesia - on plavix     Tinnitus      Urinary frequency     with some leakage     Past Surgical History:   Procedure Laterality Date     ANKLE SURGERY       CARDIAC PACEMAKER PLACEMENT  1999     CHOLECYSTECTOMY       GASTRIC BYPASS       HYSTERECTOMY  1988     JOINT REPLACEMENT Left      ME CORRJ HALLUX VALGUS W/SESMDC W/2 OSTEOT      Description: Hallux Valgus (Bunion) Correction;  Proc Date: 01/01/2004;     ME HEMORRHOIDECTOMY INTERNAL RUBBER BAND LIGATIONS      Description: Hemorrhoidectomy;  Proc Date: 01/01/2012;     ME LAP,URETHRAL SUSPENSION      Description: Laparoscopic Urethral Suspension For Stress Incontinence;  Proc Date: 01/01/2002;     ME OPEN TREATMENT PROXIMAL FIBULA/SHAFT FRACTURE      Description: Open Treatment Of Fracture Of Fibular Shaft;  Recorded: 06/11/2013;     ME REMOVAL GALLBLADDER      Description: Cholecystectomy;  Proc Date: 01/01/1987;     ME REPAIR OF RECTOCELE      Description: Rectocele Repair;  Proc Date: 01/01/2002;     ME TOTAL ABDOM HYSTERECTOMY      Description: Hysterectomy;  Proc Date: 01/01/1988;  Comments: for menorrhagia, still has ovaries     REDUCTION MAMMAPLASTY Bilateral 1997     TOTAL KNEE ARTHROPLASTY Right 9/10/2015    Done by Dr. Gutierrez, [facility]       Vitals   height is 5' 4\" (1.626 m) and weight is 158 lb 12.8 oz (72 kg). Her blood pressure is 112/68 and her pulse is 74. Her oxygen saturation is 97%.         Exam  General appearance: The patient looked well, not in acute distress.  Eyes: no evidence of thyroid eye disease.   Retinal exam: No evidence of diabetic retinopathy.  Mouth and Throat: Normal  Neck: No evidence of thyromegaly, enlarged lymph node or tenderness  Chest: Trachea is central. Chest is clear to auscultation and percussion. Breat sounds are normal.  Cardiovascular exam: JVP is not raised. Heart sounds are normal, no murmurs or " rub  Peripheral pulses are palpable.   Abdomen: No masses or tenderness.    Back: No vertebral tenderness or kyphosis.  Extremities: No evidence of leg edema.   Skin: Normal to touch.  No abnormal striae  Neurologic exam:  Visual fields are intact by confrontation, grossly intact. No evidence of peripheral neuropathy.  Detailed foot exam normal.        Diagnosis:  No diagnosis found.    Orders:   No orders of the defined types were placed in this encounter.        Assessment and Plan: Hair loss dry hair dry skin and brittle nails she has a lot of symptoms to suggest hypothyroidism I reviewed her thyroid function test TSH has been above 2.  I will check a thyroid function test and if there is room would put the patient on Synthroid 12.5 mcg that may help also her sinus bradycardia her blood pressure is 112/68.    Because of the gastric bypass I will check ferritin and iron level the patient is on ferrous gluconate 1 tablet daily I will check zinc levels as well and advise her to replace as appropriate    I discussed with the patient that I do not think it is appropriate to give her HRT she has some cold intolerance in the morning but she feels hot at night.    She has hypertension on losartan 25 mg daily blood pressure 112/68 pulse is 74 I did advise her to check blood pressure at night    She takes Plavix 75 mg daily she had TIA 12 years ago    She has vitamin D deficiency on 5000 units daily vitamin D 61.3 she return in 6 month.    I have reviewed and ordered clinical lab test    I have reviewed and ordered radiology tests.    I have reviewed and ordered her medication as required.    I have reviewed her test results and advised with the performing physician.    I have reviewed the patient's old records.    I have reviewed and summarized the patient old records.    I did spend 60 minutes with the patient more than 50% was spent on counseling and managing her care.

## 2021-06-18 NOTE — PROGRESS NOTES
Assessment/Plan:    Desi Vega is a 68 y.o. female presenting for:    1. Sunburn  She will continue to cover with Vaseline or Aquaphor.  Does not appear to all infected.  Should heal well.  She will follow-up with her dermatologist in November for her routine skin check.    2. Cough  I have personally reviewed the chest x-ray and find it to be much improved from previous.  This is particularly noted in the lateral view.  Will await radiology final read.  - XR Chest 2 Views        There are no discontinued medications.        Chief Complaint:  Chief Complaint   Patient presents with     Sunburn     Chest- was wearing a boat neck top- was wearing 50 sunblock and a hat       Subjective:   Desi Vega is a pleasant 68-year-old female presenting to the clinic today with concerns over a healing sunburn on her chest.  The patient notes that on Memorial Day, approximately 9 days ago, she was out in the sun.  She had 50 SPF sunblock on but she believes that her short had folded a bit and expose some of her upper chest.  She states that she had a very bad sunburn which actually blistered.  She states that it is improved however her son wanted her to come in and get it checked out.  She has been using Vaseline on the area which has been helpful.    She had a pneumonia approximately 1 month ago.  She is overall feeling better but was supposed to get a repeat chest x-ray and is hoping to be able to do that today.    12 point review of systems completed and negative except for what has been described above    History   Smoking Status     Never Smoker   Smokeless Tobacco     Never Used       Current Outpatient Prescriptions   Medication Sig     amoxicillin (AMOXIL) 500 MG capsule Predental     ascorbic acid, vitamin C, (VITAMIN C) 100 MG tablet Take 100 mg by mouth daily.     benzonatate (TESSALON PERLES) 100 MG capsule Take 1 capsule (100 mg total) by mouth 3 (three) times a day as needed for cough.      "betamethasone valerate (VALISONE) 0.1 % cream Apply topically 2 (two) times a day.     biotin 2,500 mcg cap Take 1 capsule by mouth daily.      cholecalciferol, vitamin D3, 5,000 unit Tab Take 5,000 Units by mouth every morning.      clopidogrel (PLAVIX) 75 mg tablet Take 1 tablet (75 mg total) by mouth daily.     codeine-guaiFENesin (CODEINE-GUAIFENESIN)  mg/5 mL liquid Take 5 mL by mouth 3 (three) times a day as needed for cough.     fluorouracil (EFUDEX) 5 % cream      ketoconazole (NIZORAL) 2 % cream Apply topically as needed.     losartan (COZAAR) 25 MG tablet Take 1 tablet (25 mg total) by mouth daily.     lutein 20 mg cap Take 20 mg by mouth every morning.      multivitamin therapeutic (THERAGRAN) tablet Take 1 tablet by mouth daily.     triamcinolone (KENALOG) 0.5 % ointment JOSHUA THIN LAYER EXT AA 1 TO 2 TIMES QD     zolpidem (AMBIEN) 5 MG tablet Take 1 tablet (5 mg total) by mouth at bedtime as needed for sleep.         Objective:  Vitals:    06/06/18 1312   BP: 102/68   Pulse: 72   Resp: 12   Temp: 97.6  F (36.4  C)   TempSrc: Oral   Weight: 159 lb (72.1 kg)   Height: 5' 4\" (1.626 m)       Vital signs reviewed and stable  General: No acute distress  Psych: Appropriate affect  HEENT: moist mucous membranes  Skin: Some erythema and scabbing in central upper chest.  This seems to be healing well.         This note has been dictated and transcribed using voice recognition software.   Any errors in transcription are unintentional and inherent to the software.  "

## 2021-06-18 NOTE — LETTER
Letter by Tamera Duke at      Author: Tamera Duke Service: -- Author Type: --    Filed:  Encounter Date: 2/17/2019 Status: (Other)       Desi Vega  [id number] [place] [id number]      February 18, 2019      Dear Ms. Vega,    RE: Remote Results    We are writing to you regarding your recent Remote Pacemaker check from home. Your transmission was received successfully. Battery status is satisfactory at this time.     Your results are within normal limits.    Your next device appointment will be a remote check on May 21, 2019.  You can choose the time of day you wish to transmit.    To schedule or reschedule, please call [phone] and press 1.    NOTE: If you would like to do an extra transmission, please call [phone] and press 3 to speak to a nurse BEFORE transmitting. This ensures that the Device Clinic staff is aware of the reason you are sending a transmission, and can follow-up with you after it has been reviewed.    We will be checking your implanted device from home (remotely) every three months unless otherwise instructed. We will need to see you in the clinic at least once a year. You may need to be seen in the clinic sooner depending on the results of your check.    Please be aware:    The follow-up schedule is like a Physician prescription.    Your remote monitor is paired to your specific implanted device.      Sincerely,    [facility]

## 2021-06-18 NOTE — PATIENT INSTRUCTIONS - HE
Patient Instructions by Roz Acevedo MD at 10/12/2020 11:00 AM     Author: Roz Acevedo MD Service: -- Author Type: Physician    Filed: 10/12/2020 11:17 AM Encounter Date: 10/12/2020 Status: Signed    : Roz Acevedo MD (Physician)         Patient Education     Exercise for a Healthier Heart  You may wonder how you can improve the health of your heart. If youre thinking about exercise, youre on the right track. You dont need to become an athlete, but you do need a certain amount of brisk exercise to help strengthen your heart. If you have been diagnosed with a heart condition, your doctor may recommend exercise to help stabilize your condition. To help make exercise a habit, choose safe, fun activities.       Be sure to check with your health care provider before starting an exercise program.    Why exercise?  Exercising regularly offers many healthy rewards. It can help you do all of the following:    Improve your blood cholesterol levels to help prevent further heart trouble    Lower your blood pressure to help prevent a stroke or heart attack    Control diabetes, or reduce your risk of getting this disease    Improve your heart and lung function    Reach and maintain a healthy weight    Make your muscles stronger and more limber so you can stay active    Prevent falls and fractures by slowing the loss of bone mass (osteoporosis)    Manage stress better  Exercise tips  Ease into your routine. Set small goals. Then build on them.  Exercise on most days. Aim for a total of 150 or more minutes of moderate to  vigorous intensity activity each week. Consider 40 minutes, 3 to 4 times a week. For best results, activity should last for 40 minutes on average. It is OK to work up to the 40 minute period over time. Examples of moderate-intensity activity is walking one mile in 15 minutes or 30 to 45 minutes of yard work.  Step up your daily activity level. Along with your exercise  program, try being more active throughout the day. Walk instead of drive. Do more household tasks or yard work.  Choose one or more activities you enjoy. Walking is one of the easiest things you can do. You can also try swimming, riding a bike, or taking an exercise class.  Stop exercising and call your doctor if you:    Have chest pain or feel dizzy or lightheaded    Feel burning, tightness, pressure, or heaviness in your chest, neck, shoulders, back, or arms    Have unusual shortness of breath    Have increased joint or muscle pain    Have palpitations or an irregular heartbeat      7002-8893 [employer]. [address] [place] [address]. All rights reserved. This information is not intended as a substitute for professional medical care. Always follow your healthcare professional's instructions.         Patient Education   Urinary Incontinence, Female (Adult)  Urinary incontinence means loss of control of the bladder. This problem affects many women, especially as they get older. If you have incontinence, you may be embarrassed to ask for help. But know that this problem can be treated.  Types of Incontinence  There are different types of incontinence. Two of the main types are described here. You can have more than one type.    Stress incontinence. With this type, urine leaks when pressure (stress) is put on the bladder. This may happen when you cough, sneeze, or laugh. Stress incontinence most often occurs because the pelvic floor muscles that support the bladder and urethra are weak. This can happen after pregnancy and vaginal childbirth or a hysterectomy. It can also be due to excess body weight or hormone changes.    Urge incontinence (also called overactive bladder). With this type, a sudden urge to urinate is felt often. This may happen even though there may not be much urine in the bladder. The need to urinate often during the night is common. Urge incontinence most often occurs  because of bladder spasms. This may be due to bladder irritation or infection. Damage to bladder nerves or pelvic muscles, constipation, and certain medicines can also lead to urge incontinence.  Treatment of urinary incontinence depends on the cause. Further evaluation is needed to find the type you have. This will likely include an exam and certain tests. Based on the results, you and your healthcare provider can then plan treatment. Until a diagnosis is made, the home care tips below can help relieve symptoms.  Home care    Do pelvic floor muscle exercises, if they are prescribed. The pelvic floor muscles help support the bladder and urethra. Many women find that their symptoms improve when doing special exercises that strengthen these muscles. To do the exercises contract the muscles you would use to stop your stream of urine, but do this when youre not urinating. Hold for 10 seconds, then relax. Repeat 10 to 20 times in a row, at least 3 times a day. Your provider may give you other instructions for how to do the exercises and how often.    Keep a bladder diary. This helps track how often and how much you urinate over a set period of time. Bring this diary with you to your next visit with the provider. The information can help your provider learn more about your bladder problem.    Lose weight, if advised to by your provider. Excess weight puts pressure on the bladder. Your provider can help you create a weight-loss plan thats right for you. This may include exercising more and making certain diet changes.    Don't consume foods and drinks that may irritate the bladder. These can include alcohol and caffeinated drinks.    Quit smoking. Smoking and other tobacco use can lead to chronic cough that strains the pelvic floor muscles. Smoking may also damage the bladder and urethra. Talk with your provider about treatments or methods you can use to quit smoking.    If drinking large amounts of fluid causes you to  have symptoms, you may be advised to limit your fluid intake. You may also be advised to drink most of your fluids during the day and to limit fluids at night.    If youre worried about urine leakage or accidents, you may wear absorbent pads to catch urine. Change the pads often. This helps reduce discomfort. It may also reduce the risk of skin or bladder infections.  Follow-up care  Follow up with your healthcare provider, or as directed. It may take some to find the right treatment for your problem. Your treatment plan may include special therapies or medicines. Certain procedures or surgery may also be options. Be sure to discuss any questions you have with your provider.  When to seek medical advice  Call the healthcare provider right away if any of these occur:    Fever of 100.4 F (38 C) or higher, or as directed by your provider    Bladder pain or fullness    Abdominal swelling    Nausea or vomiting    Back pain    Weakness, dizziness or fainting  Date Last Reviewed: 10/1/2017    4062-9158 [employer]. [address] [place] [address]. All rights reserved. This information is not intended as a substitute for professional medical care. Always follow your healthcare professional's instructions.     Patient Education   Preventing Falls in the Home  As you get older, falls are more likely. Thats because your reaction time slows. Your muscles and joints may also get stiffer, making them less flexible. Illness, medications, and vision changes can also affect your balance. A fall could leave you unable to live on your own. To make your home safer, follow these tips:    Floors    Put nonskid pads under area rugs.    Remove throw rugs.    Replace worn floor coverings.    Tack carpets firmly to each step on carpeted stairs. Put nonskid strips on the edges of uncarpeted stairs.    Keep floors and stairs free of clutter and cords.    Arrange furniture so there are clear pathways.    Clean up any  spills right away.    Bathrooms    Install grab bars in the tub or shower.    Apply nonskid strips or put a nonskid rubber mat in the tub or shower.    Sit on a bath chair to bathe.    Use bathmats with nonskid backing.    Lighting    Keep a flashlight in each room.    Put a nightlight along the pathway between the bedroom and the bathroom.    6594-0939 [employer]. [address] [place] [address], [place] [address]. All rights reserved. This information is not intended as a substitute for professional medical care. Always follow your healthcare professional's instructions.           Advance Directive  Patients advance directive was discussed and I am comfortable with the patients wishes.  Patient Education   Personalized Prevention Plan  You are due for the preventive services outlined below.  Your care team is available to assist you in scheduling these services.  If you have already completed any of these items, please share that information with your care team to update in your medical record.  Health Maintenance   Topic Date Due   ? DXA SCAN  05/01/2013   ? INFLUENZA VACCINE RULE BASED (1) 08/01/2020   ? ZOSTER VACCINES (2 of 2) 11/15/2019   ? MEDICARE ANNUAL WELLNESS VISIT  10/12/2021   ? FALL RISK ASSESSMENT  10/12/2021   ? MAMMOGRAM  12/09/2021   ? COLORECTAL CANCER SCREENING  02/03/2022   ? ADVANCE CARE PLANNING  09/10/2023   ? LIPID  08/05/2025   ? TD 18+ HE  03/18/2026   ? HEPATITIS C SCREENING  Completed   ? Pneumococcal Vaccine: 65+ Years  Completed   ? Pneumococcal Vaccine: Pediatrics (0 to 5 Years) and At-Risk Patients (6 to 64 Years)  Aged Out   ? HEPATITIS B VACCINES  Aged Out

## 2021-06-19 NOTE — LETTER
Letter by Avani Mello EPS at      Author: Avani Mello EPS Service: -- Author Type: --    Filed:  Encounter Date: 5/22/2019 Status: (Other)         Desi Vega  [address] [place] [address]      May 22, 2019      Dear Ms. Vega,    RE: Remote Results    We are writing to you regarding your recent Remote Pacemaker check from home. Your transmission was received successfully. Battery status is satisfactory at this time.     Your results are within normal limits.    Your next device appointment will be a clinic visit.  Please call in June to schedule.      To schedule or reschedule, please call [phone] and press 1.    NOTE: If you would like to do an extra transmission, please call [phone] and press 3 to speak to a nurse BEFORE transmitting. This ensures that the Device Clinic staff is aware of the reason you are sending a transmission, and can follow-up with you after it has been reviewed.    We will be checking your implanted device from home (remotely) every three months unless otherwise instructed. We will need to see you in the clinic at least once a year. You may need to be seen in the clinic sooner depending on the results of your check.    Please be aware:    The follow-up schedule is like a Physician prescription.    Your remote monitor is paired to your specific implanted device.      Sincerely,    [facility]

## 2021-06-19 NOTE — PROGRESS NOTES
[facility] Progress Note    Assessment:  1.  Chronic dyspnea on exertion which sounds improved.  She has been more active and doing activities that she enjoys.  She is not having any chest discomfort.  She had a coronary CT angiogram in April 2017 that demonstrated no obstructive disease.    2.  Hypertension.  She is on losartan.  Her blood pressure today is elevated although blood pressures in June 2018 documented through her primary care physician's office are well within normal range.  She is going to monitor her blood pressure and report these back to us over the next few weeks.    3.  Sick sinus syndrome with pacemaker implantation.  Pacemaker interrogation demonstrates stable findings and normal pacemaker function.    4.  History of transient global amnesia by report in [place] before she moved to [place].  Is been on Plavix since that time.  I asked her to discuss with Dr. Watt whether would be beneficial for her to visit with neurology to determine if she could come off Plavix.          Plan: As outlined above with follow-up in 6 months    1. Sick Sinus Syndrome     2. Essential hypertension with goal blood pressure less than 140/90  losartan (COZAAR) 25 MG tablet   3. Cardiac pacemaker in situ, dual chamber     4. Essential hypertension           An After Visit Summary was printed and given to the patient.    Subjective:    Desi Vega is a 68 y.o. female who returned for a planned  follow up visit.  She is here for her routine pacemaker follow-up.  She has been followed because of some chronic dyspnea on exertion which has been improved.  She states that she has been active and is been volunteering more and does not experience any chest pain or shortness of breath.  The stress echocardiogram September 2016 is suggested LV function to be normal.  She had a prior echocardiogram March 2016 where ejection fraction was said to be 45-50%.  She has a history of pulmonary embolism in  2015 after knee surgery.  She reports that she has been on Plavix which was started in [place] after an apparent episode of transient global amnesia.  She had a CT coronary angiogram because of ongoing symptoms back in 2017 that reported normal coronary anatomy.    Review of Systems:   General: WNL  Eyes: WNL  Ears/Nose/Throat: WNL  Lungs: WNL  Heart: WNL  Stomach: WNL  Bladder: WNL  Muscle/Joints: WNL  Skin: WNL  Nervous System: WNL  Mental Health: WNL     Blood: WNL      Problem List:    Patient Active Problem List   Diagnosis     Symptomatic Menopause     Osteopenia     Ganglion Of The Left Foot     Sick Sinus Syndrome     Insomnia     Backache     S/P total knee arthroplasty     TGA (transient global amnesia)     H/O gastric bypass     Cardiac pacemaker in situ, dual chamber     Bilateral pulmonary embolism (H)     Pulmonary embolism (H)     Hypertension     Hair loss       Social History     Social History     Marital status:      Spouse name: N/A     Number of children: N/A     Years of education: N/A     Occupational History     Not on file.     Social History Main Topics     Smoking status: Never Smoker     Smokeless tobacco: Never Used     Alcohol use Yes      Comment: rare     Drug use: No     Sexual activity: Not on file     Other Topics Concern     Not on file     Social History Narrative       Family History   Problem Relation Age of Onset     Heart attack Father      Diabetes Maternal Grandmother      Diabetes Brother        Current Outpatient Prescriptions   Medication Sig Dispense Refill     amoxicillin (AMOXIL) 500 MG capsule Predental       ascorbic acid, vitamin C, (VITAMIN C) 100 MG tablet Take 100 mg by mouth daily.       biotin 2,500 mcg cap Take 1 capsule by mouth daily.        cholecalciferol, vitamin D3, 5,000 unit Tab Take 5,000 Units by mouth every morning.        clopidogrel (PLAVIX) 75 mg tablet Take 1 tablet (75 mg total) by mouth daily. 90 tablet 3     levothyroxine (SYNTHROID,  "LEVOTHROID) 25 MCG tablet Take 1 tablet (25 mcg total) by mouth daily. 90 tablet 1     losartan (COZAAR) 25 MG tablet Take 1 tablet (25 mg total) by mouth daily. 90 tablet 4     lutein 20 mg cap Take 20 mg by mouth every morning.        multivit,stress formula-zinc (B COMPLEX-C-E-ZINC) Tab tablet Take 1 tablet on Monday, Friday 30 tablet 0     multivitamin therapeutic (THERAGRAN) tablet Take 1 tablet by mouth daily.       zolpidem (AMBIEN) 5 MG tablet Take 1 tablet (5 mg total) by mouth at bedtime as needed for sleep. 90 tablet 1     No current facility-administered medications for this visit.        Objective:     /90 (Patient Site: Left Arm, Patient Position: Sitting, Cuff Size: Adult Regular)  Pulse 72  Resp 12  Ht 5' 4\" (1.626 m)  Wt 153 lb (69.4 kg)  LMP 07/29/1988  BMI 26.26 kg/m2  153 lb (69.4 kg)     134/94    Physical Exam:    GENERAL APPEARANCE: alert, no apparent distress  HEENT: no scleral icterus or xanthelasma  NECK: jugular venous pressure and normal limits  CHEST: symmetric, the lungs are clear to auscultation the pacemaker site well-healed  CARDIOVASCULAR: regular rhythm without significant murmur, click, or gallop; no carotid bruits  Abdomen: No Organomegaly, masses, bruits, or tenderness. Bowels sounds are present      EXTREMITIES: no cyanosis, clubbing or edema    Cardiac Testing:  Procedure Date  Date: 03/21/2016 Start: 11:04 AM     Study Location: [facility]  Technical Quality: Adequate visualization     Patient Status: Routine     Contrast Medium: Definity. Used - 2 mland Wasted - 8 ml     Height: 64 inches Weight: 165 pounds BSA: 1.8 m^2 BMI: 28.32 kg/m^2     BP: 116/80 mmHg     Indications  Cardiomyopathy.      Conclusions       Summary   Definity contrast was used.   Normal left ventricular size and lower limits of normal to mildly reduced   systolic function.   Left ventricular ejection fraction is visually estimated to be 45-50%.   Pacemaker noted in the RA and RV   No " significant valvular abnormalities.   Pleural effusion is noted.   Compared to 11/2015 no significant change is observed       Indications      Dyspnea, cardiac origin suspected     Dx: Other nonspecific abnormal cardiovascular system function study [R94.39 (ICD-10-CM)]       Interpretation Summary        Normal coronary anatomy with no significant coronary artery disease detected in this study.           Technical Details      TECHNIQUE: Retrospective ECG gated CT scanning of the heart with intravenous contrast was performed on a Siemens Definition Flash CT scanner using 95 mL Omnipaque 350. Spiral protocol for coronary CT angiography was performed after administering 0.4mg of sublingual nitroglycerin. 76. Pulse range 50% - 70% of the cardiac phase. The imaging protocol was individualized to minimize radiation exposure. Image post processing was performed on a [employer] workstation. This study was performed after discussion of the goals, risk benefits and alternatives of the procedure. Risks discussed included the risk of contrast injection and diagnostic x-ray exposure. The best reconstructions were obtained at 64% of the diastolic phase and 50% of the systolic phase.       Noncoronary Findings      Left Ventricle  No left ventricular mass or thrombus.     Right Ventricle  Pacer wire present in the ventricle.     Left Atrium  No left atrial mass or thrombus.     Aorta  Normal caliber of the visualized proximal ascending aorta. Normal sized aortic root.     Aortic Valve  Tricuspid aortic valve.     Pulmonary Artery  Normal pulmonary artery and venous anatomy. All pulmonary veins draining into the left atrium.     Pericardium  Normal pericardial thickness.No pericardial effusion.       Coronary      Dominance: Right     Left Main   The vessel and its major branches are widely patent without any detectable stenosis or plaque.        Left Anterior Descending   The vessel and its major branches are widely patent  without any detectable stenosis or plaque.        Left Circumflex   The vessel and its major branches are widely patent without any detectable stenosis or plaque.        Right Coronary Artery   The vessel and its major branches are widely patent without any detectable stenosis or plaque            Lab Results:    Lab Results   Component Value Date     07/17/2017    K 4.0 06/04/2018     07/17/2017    CO2 25 07/17/2017    BUN 20 07/17/2017    CREATININE 0.74 06/04/2018    CALCIUM 9.5 07/17/2017     Lab Results   Component Value Date    CHOL 204 (H) 07/17/2017    TRIG 75 07/17/2017    HDL 78 07/17/2017     BNP (pg/mL)   Date Value   01/20/2016 13   11/18/2015 48   09/22/2015 259 (H)     Creatinine (mg/dL)   Date Value   06/04/2018 0.74   07/17/2017 0.79   07/07/2016 0.83   07/01/2016 0.78     LDL Calculated (mg/dL)   Date Value   07/17/2017 111   03/18/2016 104   06/16/2014 136 (H)     Lab Results   Component Value Date    WBC 5.1 04/09/2018    WBC 7.4 09/23/2015    HGB 14.2 04/09/2018    HCT 42.8 04/09/2018    MCV 96 04/09/2018     04/09/2018               This note has been dictated using voice recognition software. Any grammatical or context distortions are unintentional and inherent to the software.      Orlin Wells M.D., F.A.C.C.  [facility]  [phone]

## 2021-06-19 NOTE — LETTER
Letter by Roz Acevedo MD at      Author: Roz Acevedo MD Service: -- Author Type: --    Filed:  Encounter Date: 7/24/2019 Status: (Other)         Desi Vega  [address] [place] [address]             July 24, 2019         Dear Ms. eVga,    Below are the results from your recent visit:    Resulted Orders   XR Shoulder Right 2 or More VWS    Narrative    EXAM: XR SHOULDER RIGHT 2 OR MORE VWS  LOCATION: [facility]  DATE/TIME: 7/24/2019 9:02 AM    INDICATION: Pain in right shoulder  COMPARISON: None.    FINDINGS: Normal alignment without a fracture or dislocation. Adjacent chest wall is normal. Pacemaker leads.        So great to see you!  Radiologist agrees that the xray is normal.      Please call with questions or contact us using [employer].    Sincerely,        Electronically signed by Roz Acevedo MD

## 2021-06-19 NOTE — LETTER
Letter by Roz Acevedo MD at      Author: Roz Acevedo MD Service: -- Author Type: --    Filed:  Encounter Date: 3/11/2019 Status: (Other)       March 13, 2019     Roz Acevedo MD  [address] [address]  [address] [address]    Patient: Desi Vega   MR Number: [id number]   YOB: 1950           To whom it may concern,    Desi Vega is my patient at the [facility].  The patient suffers from debilitating insomnia.  She is tried a myriad of treatments and Ambien seems to be the only medication that works well for her.  She has been stable on this medication for quite some time and I believe changing the medication at this point would risk side effects including inability to sleep and increased risk for falls.    If you have questions, please do not hesitate to call me.    Sincerely,        Roz Acevedo MD

## 2021-06-19 NOTE — PROGRESS NOTES
In clinic device check with Dr. Wells.  Please see link for full device report.  Patient was informed of results and next follow up during today's visit.

## 2021-06-19 NOTE — LETTER
Letter by Alyssia Forrester RN at      Author: Alyssia Forrester RN Service: -- Author Type: --    Filed:  Encounter Date: 11/19/2019 Status: Signed         Desi Vega  [address] [place] [address]      November 19, 2019      Dear Ms. Vega,    RE: Remote Results    We are writing to you regarding your recent Remote Pacemaker check from home. Your transmission was received successfully. Battery status is satisfactory at this time.     Your results are within normal limits.    Your next device appointment will be a remote check on Wednesday, February 19th, 2020.  You can choose the time of day you wish to transmit.    To schedule or reschedule, please call [phone] and press 1.    NOTE: If you would like to do an extra transmission, please call [phone] and press 3 to speak to a nurse BEFORE transmitting. This ensures that the Device Clinic staff is aware of the reason you are sending a transmission, and can follow-up with you after it has been reviewed.    We will be checking your implanted device from home (remotely) every three months unless otherwise instructed. We will need to see you in the clinic at least once a year. You may need to be seen in the clinic sooner depending on the results of your check.    Please be aware:    The follow-up schedule is like a Physician prescription.    Your remote monitor is paired to your specific implanted device.      Sincerely,    [facility]

## 2021-06-20 NOTE — LETTER
Letter by Roz Acevedo MD at      Author: Roz Acevedo MD Service: -- Author Type: --    Filed:  Encounter Date: 8/6/2020 Status: (Other)         Desi Vega  [address] [place] [address]             August 6, 2020         Dear Ms. Vega,    Below are the results from your recent visit:    Resulted Orders   Lipid [place] FASTING   Result Value Ref Range    Cholesterol 162 <=199 mg/dL    Triglycerides 122 <=149 mg/dL    HDL Cholesterol 62 >=50 mg/dL    LDL Calculated 76 <=129 mg/dL    Patient Fasting > 8hrs? Unknown    Basic Metabolic Panel   Result Value Ref Range    Sodium 144 136 - 145 mmol/L    Potassium 4.9 3.5 - 5.0 mmol/L    Chloride 107 98 - 107 mmol/L    CO2 28 22 - 31 mmol/L    Anion Gap, Calculation 9 5 - 18 mmol/L    Glucose 92 70 - 125 mg/dL    Calcium 9.1 8.5 - 10.5 mg/dL    BUN 11 8 - 28 mg/dL    Creatinine 0.73 0.60 - 1.10 mg/dL    GFR MDRD Af Amer >60 >60 mL/min/1.73m2    GFR MDRD Non Af Amer >60 >60 mL/min/1.73m2    Narrative    Fasting Glucose reference range is 70-99 mg/dL per  American Diabetes Association (ADA) guidelines.   Thyroid Stimulating Hormone (TSH)   Result Value Ref Range    TSH 0.97 0.30 - 5.00 uIU/mL       Labs are normal!  See you in a few months.      Please call with questions or contact us using [employer].    Sincerely,        Electronically signed by Roz Acevedo MD

## 2021-06-20 NOTE — PROGRESS NOTES
Assessment and Plan:   Rashawn is an extremely pleasant 5-year-old female presenting to the clinic for her annual wellness visit.    1. Encounter for screening for lipoid disorders  - Lipid [place], FASTING; Future  - Lipid [place], FASTING    2. Overactive bladder  She has had a bladder lift ×2.  She wants to discuss a pessary of I am not sure she would be a candidate for this given the bladder lift.  We could also discuss medication but she would like to see gynecologist first.  - Ambulatory referral to Gynecology    3. Healthcare maintenance  - HM2(CBC w/o Differential)  - Influenza High Dose, Seasonal 65+ yrs  - Glycosylated Hemoglobin A1c    4. Pulmonary embolism (H)  Still mild shortness of breath.  CT scan showed that all clots are gone.    5. Benign essential hypertension  - Basic Metabolic Panel    6.  Sick sinus symptom with a pacemaker: Recent follow-up with cardiologist was normal.    7. Routine general medical examination at a health care facility  Encourage physical activity and healthful eating.    The patient's current medical problems were reviewed.    I have had an Advance Directives discussion with the patient.  The following high BMI interventions were performed this visit: encouragement to exercise and weight monitoring  The following health maintenance schedule was reviewed with the patient and provided in printed form in the after visit summary:   Health Maintenance   Topic Date Due     DIABETES FOLLOW-UP  1950     DIABETES FOOT EXAM  05/01/1958     DIABETES OPHTHALMOLOGY EXAM  05/01/1958     DIABETES URINE MICROALBUMIN  05/01/1958     ADVANCE DIRECTIVES DISCUSSED WITH PATIENT  05/01/1966     ZOSTER VACCINE  05/01/2008     DXA SCAN  05/01/2013     FALL RISK ASSESSMENT  05/01/2013     INFLUENZA VACCINE RULE BASED (1) 08/01/2018     DIABETES HEMOGLOBIN A1C  12/04/2018     MAMMOGRAM  09/20/2019     COLONOSCOPY  02/03/2022     TD 18+ HE  03/18/2026     PNEUMOCOCCAL POLYSACCHARIDE VACCINE AGE  65 AND OVER  Completed     PNEUMOCOCCAL CONJUGATE VACCINE FOR ADULTS (PCV13 OR PREVNAR)  Completed        Subjective:   Chief Complaint: Desi Vega is an 68 y.o. female here for an Annual Wellness visit.   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    She is overall doing very well.  She tries to stay active.  She goes to [place] in the winter.  She recently saw her cardiologist and there were no concerns in that regard.  Her pulmonary embolism was several years ago now.  She still remains slightly short of breath on occasion but overall is doing well.    She was having some issues with hair loss and is seeing endocrinology who placed her on a low-dose levothyroxine which she states has been helpful.    She has been unfortunately having some issues with bladder leakage.  She states that she wears a pad at night and when she wakes up it is generally fairly damp.  She has had 2 bladder lifts in the past.  She notes that when she coughs and sneezes she does not lose urine but she has urgency to go.    Review of Systems: Please see above.  The rest of the review of systems are negative for all systems.    Patient Care Team:  Roz Acevedo MD as PCP - General (Family Medicine)     Patient Active Problem List   Diagnosis     Symptomatic Menopause     Osteopenia     Ganglion Of The Left Foot     Sick Sinus Syndrome     Insomnia     Backache     S/P total knee arthroplasty     TGA (transient global amnesia)     H/O gastric bypass     Cardiac pacemaker in situ, dual chamber     Bilateral pulmonary embolism (H)     Pulmonary embolism (H)     Hypertension     Hair loss     Past Medical History:   Diagnosis Date     Anhedonia      Arthritis      Diarrhea      thought to be due to gastric bypass     Grief reaction      History of palpitations      Hyperlipidemia      Insomnia      Osteopenia      Osteopenia      Shortness of breath      Sick sinus syndrome (H)     bradycardia - pacemaker placed     TGA (transient global amnesia) 2004    Transient global amnesia - on plavix     Tinnitus      Urinary frequency     with some leakage      Past Surgical History:   Procedure Laterality Date     ANKLE SURGERY       CARDIAC PACEMAKER PLACEMENT  1999     CHOLECYSTECTOMY       GASTRIC BYPASS       HYSTERECTOMY  1988     JOINT REPLACEMENT Left      OR CORRJ HALLUX VALGUS W/SESMDC W/2 OSTEOT      Description: Hallux Valgus (Bunion) Correction;  Proc Date: 01/01/2004;     OR HEMORRHOIDECTOMY INTERNAL RUBBER BAND LIGATIONS      Description: Hemorrhoidectomy;  Proc Date: 01/01/2012;     OR LAP,URETHRAL SUSPENSION      Description: Laparoscopic Urethral Suspension For Stress Incontinence;  Proc Date: 01/01/2002;     OR OPEN TREATMENT PROXIMAL FIBULA/SHAFT FRACTURE      Description: Open Treatment Of Fracture Of Fibular Shaft;  Recorded: 06/11/2013;     OR REMOVAL GALLBLADDER      Description: Cholecystectomy;  Proc Date: 01/01/1987;     OR REPAIR OF RECTOCELE      Description: Rectocele Repair;  Proc Date: 01/01/2002;     OR TOTAL ABDOM HYSTERECTOMY      Description: Hysterectomy;  Proc Date: 01/01/1988;  Comments: for menorrhagia, still has ovaries     REDUCTION MAMMAPLASTY Bilateral 1997     TOTAL KNEE ARTHROPLASTY Right 9/10/2015    Done by Dr. Gutierrez, [facility]      Family History   Problem Relation Age of Onset     Heart attack Father      Diabetes Maternal Grandmother      Diabetes Brother       Social History     Social History     Marital status:      Spouse name: N/A     Number of children: N/A     Years of education: N/A     Occupational History     Not on file.     Social History Main Topics     Smoking status: Never Smoker     Smokeless tobacco: Never Used      "Alcohol use Yes      Comment: rare     Drug use: No     Sexual activity: Not on file     Other Topics Concern     Not on file     Social History Narrative      Current Outpatient Prescriptions   Medication Sig Dispense Refill     amoxicillin (AMOXIL) 500 MG capsule Predental       ascorbic acid, vitamin C, (VITAMIN C) 100 MG tablet Take 100 mg by mouth daily.       biotin 2,500 mcg cap Take 1 capsule by mouth daily.        cholecalciferol, vitamin D3, 5,000 unit Tab Take 5,000 Units by mouth every morning.        clopidogrel (PLAVIX) 75 mg tablet Take 1 tablet (75 mg total) by mouth daily. 90 tablet 3     levothyroxine (SYNTHROID, LEVOTHROID) 25 MCG tablet Take 1 tablet (25 mcg total) by mouth daily. 90 tablet 1     losartan (COZAAR) 25 MG tablet Take 1 tablet (25 mg total) by mouth daily. 90 tablet 2     lutein 20 mg cap Take 20 mg by mouth every morning.        multivit,stress formula-zinc (B COMPLEX-C-E-ZINC) Tab tablet Take 1 tablet on Monday, Friday 30 tablet 0     multivitamin therapeutic (THERAGRAN) tablet Take 1 tablet by mouth daily.       zolpidem (AMBIEN) 5 MG tablet Take 1 tablet (5 mg total) by mouth at bedtime as needed for sleep. 90 tablet 1     No current facility-administered medications for this visit.       Objective:   Vital Signs:   Visit Vitals     /70     Pulse 72     Temp 97.4  F (36.3  C) (Oral)     Resp 12     Ht 5' 3.75\" (1.619 m)     Wt 151 lb 5 oz (68.6 kg)     LMP 07/29/1988     BMI 26.18 kg/m2        VisionScreening:  No exam data present     PHYSICAL EXAM    Physical Exam:  General Appearance: Alert, cooperative, no distress, appears stated age   Head: Normocephalic, without obvious abnormality, atraumatic  Eyes: PERRL, conjunctiva/corneas clear, EOM's intact   Ears: Normal TM's and external ear canals, both ears  Nose:Nares normal, septum midline,mucosa normal, no drainage    Throat:Lips, mucosa, and tongue normal; teeth and gums normal  Neck: Supple, symmetrical, trachea " midline, no adenopathy;  thyroid: not enlarged, symmetric, no tenderness/mass/nodules  Back: Symmetric, no curvature, ROM normal,  Lungs: Clear to auscultation bilaterally, respirations unlabored  Breasts: No breast masses, tenderness, asymmetry, or nipple discharge.  Heart: Regular rate and rhythm, S1 and S2 normal, no murmur, rub, or gallop  Abdomen: Soft, non-tender, bowel sounds active all four quadrants,  no masses, no organomegaly  Extremities: Extremities normal, atraumatic, no cyanosis or edema  Skin: Skin color, texture, turgor normal, no rashes or lesions  Lymph nodes: Cervical, supraclavicular, and axillary nodes normal and   Neurologic: Normal            Assessment Results 9/10/2018   Activities of Daily Living No help needed   Instrumental Activities of Daily Living No help needed   Get Up and Go Score Less than 12 seconds   Mini Cog Total Score 5   Some recent data might be hidden     A Mini-Cog score of 0-2 suggests the possibility of dementia, score of 3-5 suggests no dementia    Identified Health Risks:     She is at risk for lack of exercise and has been provided with information to increase physical activity for the benefit of her well-being.  The patient was counseled and encouraged to consider modifying their diet and eating habits. She was provided with information on recommended healthy diet options.  The patient's JULIÁN-7 score is consistent with probably anxiety disorder.  She was provided with patient information regarding anxiety and was advised to set up a follow up appointment in 12 weeks to further address this issue.  Patient's advanced directive was discussed and I am comfortable with the patient's wishes.

## 2021-06-20 NOTE — LETTER
Letter by Kailee Aquino RN at      Author: Kailee Aquino RN Service: -- Author Type: --    Filed:  Encounter Date: 10/12/2020 Status: (Other)         Desi Vega  [address] [place] [address]      October 12, 2020      Dear Ms. Marquezlamarconchis,    RE: Remote Results    We are writing to you regarding your recent Remote Pacemaker check from home. Your transmission was received successfully. Battery status is satisfactory at this time.     Your results are showing no significant changes.    Your next device appointment will be a remote check on 1/11/2021.  You can choose the time of day you wish to transmit.    To schedule or reschedule, please call [phone] and press 1.    NOTE: If you would like to do an extra transmission, please call [phone] and press 3 to speak to a nurse BEFORE transmitting. This ensures that the Device Clinic staff is aware of the reason you are sending a transmission, and can follow-up with you after it has been reviewed.    We will be checking your implanted device from home (remotely) every three months unless otherwise instructed. We will need to see you in the clinic at least once a year. You may need to be seen in the clinic sooner depending on the results of your check.    Please be aware:    The follow-up schedule is like a Physician prescription.    Your remote monitor is paired to your specific implanted device.      Sincerely,    [facility]

## 2021-06-20 NOTE — LETTER
Letter by Jane Small EPS at      Author: Jane Small EPS Service: -- Author Type: --    Filed:  Encounter Date: 2/7/2020 Status: (Other)         Desi Vega  [address] [place] [address]      February 7, 2020      Dear Ms. Vega,    RE: Remote Results    We are writing to you regarding your recent Remote Pacemaker check from home. Your transmission was received successfully. Battery status is satisfactory at this time.     Your results are within normal limits.    Your next device appointment will be a remote check on May 7, 2020.  You can choose the time of day you wish to transmit.    To schedule or reschedule, please call [phone] and press 1.    NOTE: If you would like to do an extra transmission, please call [phone] and press 3 to speak to a nurse BEFORE transmitting. This ensures that the Device Clinic staff is aware of the reason you are sending a transmission, and can follow-up with you after it has been reviewed.    We will be checking your implanted device from home (remotely) every three months unless otherwise instructed. We will need to see you in the clinic at least once a year. You may need to be seen in the clinic sooner depending on the results of your check.    Please be aware:    The follow-up schedule is like a Physician prescription.    Your remote monitor is paired to your specific implanted device.      Sincerely,    [facility]

## 2021-06-20 NOTE — LETTER
Letter by Lela Larose RN at      Author: Lela Larose RN Service: -- Author Type: --    Filed:  Encounter Date: 6/12/2020 Status: (Other)         Desi Vega  [address] [place] [address]      June 12, 2020      Dear Ms. Vega,    RE: Remote Results    We are writing to you regarding your recent Remote Pacemaker check from home. Your transmission was received successfully. Battery status is satisfactory at this time.     Your results are within normal limits.    Your next device appointment will be a remote check on 9/15/2020.  You can choose the time of day you wish to transmit.    To schedule or reschedule, please call [phone] and press 1.    NOTE: If you would like to do an extra transmission, please call [phone] and press 3 to speak to a nurse BEFORE transmitting. This ensures that the Device Clinic staff is aware of the reason you are sending a transmission, and can follow-up with you after it has been reviewed.    We will be checking your implanted device from home (remotely) every three months unless otherwise instructed. We will need to see you in the clinic at least once a year. You may need to be seen in the clinic sooner depending on the results of your check.    Please be aware:    The follow-up schedule is like a Physician prescription.    Your remote monitor is paired to your specific implanted device.      Sincerely,    [facility]

## 2021-06-20 NOTE — PROGRESS NOTES
[facility] Clinic Note    Patient Name: Desi Vega  Patient Age: 68 y.o.  YOB: 1950  MRN: [id number]    Date of visit: 10/4/2018    Patient Active Problem List   Diagnosis     Symptomatic Menopause     Osteopenia     Ganglion Of The Left Foot     Sick Sinus Syndrome     Insomnia     Backache     S/P total knee arthroplasty     TGA (transient global amnesia)     H/O gastric bypass     Cardiac pacemaker in situ, dual chamber     Pulmonary embolism (H)     Hypertension     Hair loss     Social History     Social History Narrative     Family History   Problem Relation Age of Onset     Heart attack Father      Diabetes Maternal Grandmother      Diabetes Brother      Outpatient Encounter Prescriptions as of 10/4/2018   Medication Sig Dispense Refill     amoxicillin (AMOXIL) 500 MG capsule Predental       ascorbic acid, vitamin C, (VITAMIN C) 100 MG tablet Take 100 mg by mouth daily.       biotin 2,500 mcg cap Take 1 capsule by mouth daily.        cholecalciferol, vitamin D3, 5,000 unit Tab Take 5,000 Units by mouth every morning.        clopidogrel (PLAVIX) 75 mg tablet Take 1 tablet (75 mg total) by mouth daily. 90 tablet 3     levothyroxine (SYNTHROID, LEVOTHROID) 25 MCG tablet Take 1 tablet (25 mcg total) by mouth daily. 90 tablet 1     losartan (COZAAR) 25 MG tablet Take 1 tablet (25 mg total) by mouth daily. 90 tablet 2     lutein 20 mg cap Take 20 mg by mouth every morning.        multivit,stress formula-zinc (B COMPLEX-C-E-ZINC) Tab tablet Take 1 tablet on Monday, Friday 30 tablet 0     multivitamin therapeutic (THERAGRAN) tablet Take 1 tablet by mouth daily.       zolpidem (AMBIEN) 5 MG tablet Take 1 tablet (5 mg total) by mouth at bedtime as needed for sleep. 90 tablet 1     No facility-administered encounter medications on file as of 10/4/2018.        Chief Complaint:   Chief Complaint   Patient presents with     Hand Pain     finger slammed in door       /62  Pulse 68  Temp 97.6  " F (36.4  C) (Oral)   Resp 12  Ht 5' 3.75\" (1.619 m)  Wt 153 lb (69.4 kg)  LMP 07/29/1988  BMI 26.47 kg/m2  HPI:   Finger slammed in door yesterday morning.  Soaked on cool water for some time.  Better than previous.  Distal phalynx.  Able to flex.  Not painful to flex. No numbness.  Painful yesterday but today is better.      ROS: Pertinent ros findings in hpi, all other systems negative.    Objective/Physical Exam:     /62  Pulse 68  Temp 97.6  F (36.4  C) (Oral)   Resp 12  Ht 5' 3.75\" (1.619 m)  Wt 153 lb (69.4 kg)  LMP 07/29/1988  BMI 26.47 kg/m2    Gen: NAD, appears age  Skin: warm, dry  HENT: normocephalic atraumatic, MMM  Eyes: non-icteric, no proptosis  CV: NRRR no m/r/g, no peripheral edema  Resp: CTAB no w/r/r, normal respiratory effort  Abd: non-distended, soft  Hematologic: No petechiae or purpura    Psych: Cooperative, full affect    Right index finger there is mild swelling over the distal phalanx there is a 8 mm superficial laceration on the ulnar side just lateral of the nail.  There is a crack across the distal nail but this does not involve the nailbed.  Slight ecchymosis proximal ulnar corner of the skin surrounding the nail.  Good range of motion of DIP and PIP only tender to palpation distal ulnar aspect of the index finger, there is no tenderness to palpation on the radial side distally or anywhere else in the finger.  Laceration appears clean there is no bleeding.  Radial pulse 2+.      Assessment/Plan:  No results found for this or any previous visit (from the past 24 hour(s)).  No medications were ordered this encounter      ICD-10-CM    1. Injury of index finger, right, initial encounter S69.91XA XR Finger Right 2 or More VWS       I looked over the x-rays myself, I do not see evidence of a fracture.  I recommend keep a bandage over the lacerated area and following up if any worsening symptoms.  Patient was comfortable with this plan.    There are no Patient Instructions " on file for this visit.    Counseled patient regarding treatments, treatment options, risks and benefits and diagnosis.  The patient was interactive, attentive, verbalized understanding, and we discussed plan.     Darryn Garcia MD

## 2021-06-21 NOTE — LETTER
Letter by Tamera Duke at      Author: Tamera Duke Service: -- Author Type: --    Filed:  Encounter Date: 1/11/2021 Status: (Other)         Desi Vega  [address] [place] [address]      January 11, 2021      Dear Ms. Vega,    RE: Remote Results    We are writing to you regarding your recent Remote Pacemaker check from home. Your transmission was received successfully. Battery status is satisfactory at this time.     Your results are within normal limits.    Your next device appointment will be a remote check on April 12, 2021.  You can choose the time of day you wish to transmit.    To schedule or reschedule, please call [phone] and press 1.    NOTE: If you would like to do an extra transmission, please call [phone] and press 3 to speak to a nurse BEFORE transmitting. This ensures that the Device Clinic staff is aware of the reason you are sending a transmission, and can follow-up with you after it has been reviewed.    We will be checking your implanted device from home (remotely) every three months unless otherwise instructed. We will need to see you in the clinic at least once a year. You may need to be seen in the clinic sooner depending on the results of your check.    Please be aware:    The follow-up schedule is like a Physician prescription.    Your remote monitor is paired to your specific implanted device.      Sincerely,    [facility]

## 2021-06-21 NOTE — LETTER
Letter by Tamera Duke at      Author: Tamera Duke Service: -- Author Type: --    Filed:  Encounter Date: 5/25/2021 Status: (Other)         Desi Vega  [address] [place] [address]      May 25, 2021      Dear Ms. Vega,    RE: Remote Results    We are writing to you regarding your recent Remote Pacemaker check from home. Your transmission was received successfully. Battery status is satisfactory at this time.     Your results are within normal limits.    Your next device appointment will be a clinic visit.  Please call in June to schedule.      To schedule or reschedule, please call [phone] and press 1.    NOTE: If you would like to do an extra transmission, please call [phone] and press 3 to speak to a nurse BEFORE transmitting. This ensures that the Device Clinic staff is aware of the reason you are sending a transmission, and can follow-up with you after it has been reviewed.    We will be checking your implanted device from home (remotely) every three months unless otherwise instructed. We will need to see you in the clinic at least once a year. You may need to be seen in the clinic sooner depending on the results of your check.    Please be aware:    The follow-up schedule is like a Physician prescription.    Your remote monitor is paired to your specific implanted device.      Sincerely,    [facility]

## 2021-06-22 NOTE — PROGRESS NOTES
Progress Note    Reason for Visit:  Chief Complaint     Alopecia          Progress Note:    HPI:   This patient is here for follow-up because of her loss.    Component      Latest Ref Rng & Units 6/4/2018 9/10/2018 11/30/2018   Cholesterol      <=199 mg/dL  195    Triglycerides      <=149 mg/dL  92    HDL Cholesterol      >=50 mg/dL  73    LDL Calculated      <=129 mg/dL  104    Patient Fasting > 8hrs?        Yes    Creatinine      0.60 - 1.10 mg/dL 0.74     GFR MDRD Af Amer      >60 mL/min/1.73m2 >60     GFR MDRD Non Af Amer      >60 mL/min/1.73m2 >60     T3, Total      45 - 175 ng/dL 64     Free T4      0.7 - 1.8 ng/dL 1.0  1.0   TSH      0.30 - 5.00 uIU/mL 2.60  1.39   Hemoglobin A1c      3.5 - 6.0 % 5.5 5.5    Potassium      3.5 - 5.0 mmol/L 4.0     Estradiol      pg/mL <10     LH      mIU/mL 18.2     FSH      mIU/mL 47.7     Ferritin      10 - 130 ng/mL 43     Zinc, Serum/Plasma      60 - 120 ug/dL 64     Iron      42 - 175 ug/dL 88     Vitamin B-12      213 - 816 pg/mL 349         Review of Systems:    Nervous System: No headache, dizziness, fainting or memory loss. No tingling sensation of hand or feet.  Ears: No hearing loss or ringing in the ears  Eyes: No blurring of vision, redness, itching or dryness.  Nose: No nosebleed or loss of smell  Mouth: No mouth sores or loss of taste  Throat: No hoarseness or difficulty swallowing  Neck: No enlarged thyroid or lymph nodes.  Heart: No chest pain, palpitation or irregular heartbeat. No swelling of hands or feet  Lungs: No shortness of breath, cough, night sweats, wheezing or hemoptysis.  Gastrointestinal: No nausea or vomiting, constipation or diarrhea.  No acid reflux, abdominal pain or blood in stools.  Kidney/Bladdr: No polyuria, polydipsia, nocturia or hematuria.  Genital/Sexual: No loss of libido  Skin: No rash, hair loss or hirsutism.  No abnormal striae  Muscles/Joints/Bones: No morning stiffness, muscle aches and pain or loss of height.    Current  Medications:  Current Outpatient Medications   Medication Sig     amoxicillin (AMOXIL) 500 MG capsule Predental     ascorbic acid, vitamin C, (VITAMIN C) 100 MG tablet Take 100 mg by mouth daily.     biotin 2,500 mcg cap Take 1 capsule by mouth daily.      cholecalciferol, vitamin D3, 5,000 unit Tab Take 5,000 Units by mouth every morning.      clopidogrel (PLAVIX) 75 mg tablet Take 1 tablet (75 mg total) by mouth daily.     levothyroxine (SYNTHROID, LEVOTHROID) 25 MCG tablet TAKE 1 TABLET (25 MCG TOTAL) BY MOUTH DAILY.     losartan (COZAAR) 25 MG tablet Take 1 tablet (25 mg total) by mouth daily.     lutein 20 mg cap Take 20 mg by mouth every morning.      multivit,stress formula-zinc (B COMPLEX-C-E-ZINC) Tab tablet Take 1 tablet on Monday, Friday     multivitamin therapeutic (THERAGRAN) tablet Take 1 tablet by mouth daily.     zolpidem (AMBIEN) 5 MG tablet Take 1 tablet (5 mg total) by mouth at bedtime as needed for sleep.       Patients Active Problems:  Patient Active Problem List   Diagnosis     Symptomatic Menopause     Osteopenia     Ganglion Of The Left Foot     Sick Sinus Syndrome     Insomnia     Backache     S/P total knee arthroplasty     TGA (transient global amnesia)     H/O gastric bypass     Cardiac pacemaker in situ, dual chamber     Pulmonary embolism (H)     Hypertension     Hair loss       History:   reports that  has never smoked. she has never used smokeless tobacco. She reports that she drinks alcohol. She reports that she does not use drugs.   reports that  has never smoked. she has never used smokeless tobacco. She reports that she drinks alcohol. She reports that she does not use drugs.  Social History     Tobacco Use   Smoking Status Never Smoker   Smokeless Tobacco Never Used      reports that  has never smoked. she has never used smokeless tobacco. She reports that she drinks alcohol. She reports that she does not use drugs.  Social History     Substance and Sexual Activity   Sexual  Activity Not on file     Past Medical History:   Diagnosis Date     Anhedonia      Arthritis      Diarrhea     thought to be due to gastric bypass     Grief reaction      History of palpitations      Hyperlipidemia      Insomnia      Osteopenia      Osteopenia      Shortness of breath      Sick sinus syndrome (H)     bradycardia - pacemaker placed     TGA (transient global amnesia) 2004    Transient global amnesia - on plavix     Tinnitus      Urinary frequency     with some leakage     Family History   Problem Relation Age of Onset     Heart attack Father      Diabetes Maternal Grandmother      Diabetes Brother      Past Medical History:   Diagnosis Date     Anhedonia      Arthritis      Diarrhea     thought to be due to gastric bypass     Grief reaction      History of palpitations      Hyperlipidemia      Insomnia      Osteopenia      Osteopenia      Shortness of breath      Sick sinus syndrome (H)     bradycardia - pacemaker placed     TGA (transient global amnesia) 2004    Transient global amnesia - on plavix     Tinnitus      Urinary frequency     with some leakage     Past Surgical History:   Procedure Laterality Date     ANKLE SURGERY       CARDIAC PACEMAKER PLACEMENT  1999     CHOLECYSTECTOMY       GASTRIC BYPASS       HYSTERECTOMY  1988     JOINT REPLACEMENT Left      TX CORRJ HALLUX VALGUS W/SESMDC W/2 OSTEOT      Description: Hallux Valgus (Bunion) Correction;  Proc Date: 01/01/2004;     TX HEMORRHOIDECTOMY INTERNAL RUBBER BAND LIGATIONS      Description: Hemorrhoidectomy;  Proc Date: 01/01/2012;     TX LAP,URETHRAL SUSPENSION      Description: Laparoscopic Urethral Suspension For Stress Incontinence;  Proc Date: 01/01/2002;     TX OPEN TREATMENT PROXIMAL FIBULA/SHAFT FRACTURE      Description: Open Treatment Of Fracture Of Fibular Shaft;  Recorded: 06/11/2013;     TX REMOVAL GALLBLADDER      Description: Cholecystectomy;  Proc Date: 01/01/1987;     TX REPAIR OF RECTOCELE      Description: Rectocele  "Repair;  Proc Date: 01/01/2002;     AL TOTAL ABDOM HYSTERECTOMY      Description: Hysterectomy;  Proc Date: 01/01/1988;  Comments: for menorrhagia, still has ovaries     REDUCTION MAMMAPLASTY Bilateral 1997     TOTAL KNEE ARTHROPLASTY Right 9/10/2015    Done by Dr. Gutierrez, [facility]       Vitals   height is 5' 3.75\" (1.619 m) and weight is 157 lb (71.2 kg). Her blood pressure is 120/84.         Exam  General appearance: The patient looked well, not in acute distress.  Eyes: no evidence of thyroid eye disease.   Retinal exam: No evidence of diabetic retinopathy.  Mouth and Throat: Normal  Neck: No evidence of thyromegaly, enlarged lymph node or tenderness  Chest: Trachea is central. Chest is clear to auscultation and percussion. Breat sounds are normal.  Cardiovascular exam: JVP is not raised. Heart sounds are normal, no murmurs or rub  Peripheral pulses are palpable.   Abdomen: No masses or tenderness.    Back: No vertebral tenderness or kyphosis.  Extremities: No evidence of leg edema.   Skin: Normal to touch.  No abnormal striae  Neurologic exam:  Visual fields are intact by confrontation, grossly intact. No evidence of peripheral neuropathy.  Detailed foot exam normal.        Diagnosis:  No diagnosis found.    Orders:   No orders of the defined types were placed in this encounter.        Assessment and Plan: Hair loss I started the patient on Synthroid 25 mcg daily TSH came down from 2.6-1.39 free T4 is 1.  She feels more energy her nails are better but still having some hair loss so we will increase the dose to 2 tablets on Monday and Friday the rest of the day she will take 1 tablet only.  I told her if she has any palpitation to go back to 1 pill every day.    She has a pacemaker because of bradycardia    She takes biotin vitamin D 5000 units a day Plavix 75 mg daily she had transient global amnesia    She has hypertension on losartan 25 mg.  She takes B complex.    Her iron think are normal.    She " does have she feels cold during the day and hot during the night it is 5.5 she had gastric bypass.    Patient return to clinic in 6-month    I have reviewed and ordered clinical lab test    I have reviewed and ordered radiology tests.    I have reviewed and ordered her medication as required.    I have reviewed her test results and advised with the performing physician.    I have reviewed the patient's old records.    I have reviewed and summarized the patient old records.    I did spend 40 minutes with the patient more than 50% was spent on counseling and managing her care.

## 2021-06-24 NOTE — TELEPHONE ENCOUNTER
Prior Authorization Request  Who s requesting:  Patient  Pharmacy Name and Location: [facility] pharmacy   Medication Name: Ambien 5 mg tab   Insurance Plan: [employer]   Insurance Member ID Number:  [id number]  Informed patient that prior authorizations can take up to 10 business days for response:   Yes  Okay to leave a detailed message: Yes

## 2021-06-24 NOTE — TELEPHONE ENCOUNTER
Spoke to [facility] pharmacy. They state a PA is needed for ambien (zolpidem) 5mg.  They do not show that a previous PA has been submitted.  Please contact [phone] to start PA process.

## 2021-06-24 NOTE — TELEPHONE ENCOUNTER
I would like to appeal - other meds have not worked in the past    Please let me know what I need to do    BB

## 2021-06-29 NOTE — PROGRESS NOTES
Progress Notes by Orlin Wells MD at 8/10/2020  9:10 AM     Author: Orlin Wells MD Service: -- Author Type: Physician    Filed: 8/10/2020 11:03 AM Encounter Date: 8/10/2020 Status: Signed    : Orlin Wells MD (Physician)             [facility] Progress Note    Assessment:  1.  Sick sinus syndrome.  Pacemaker interrogation a few months ago demonstrated normal pacemaker function without significant rhythm abnormality.  2.  History of dyspnea on exertion.  This continues to be improved.  Echocardiography from 1 year ago does demonstrate mild reduction in left ventricular function.  She has been on losartan and we are going to plan follow-up echocardiographic imaging.  3.  Hypertension.  Blood pressure appears to be under good control.  4.  Risk factor modification.  She has done a nice job with respect to diet and exercise.  The most recent lipids continue to be improved.  LDL cholesterol of 76 August 5, 2020.      Plan: As outlined above with plan for echocardiogram and follow-up in 1 year.    1. Secondary cardiomyopathy (H)  Echo Complete   2. Essential hypertension with goal blood pressure less than 140/90  losartan (COZAAR) 25 MG tablet         An After Visit Summary was printed and given to the patient.    Subjective:    Desi Vega is a 70 y.o. female who returned for a planned  follow up visit.  She reports that she has been feeling well.  She denies chest pain or shortness of breath.  She has been walking regularly although has been a little less walking as of late.  She reports her blood pressures have been under good control.  She tells me she has been off Plavix since she visited with  a number of months ago.  She had been on Plavix for transient global amnesia.  Her pacemaker was most recently interrogated in June 2020 with normal pacemaker function.  Echocardiogram from September 2019 revealed an ejection fraction that was mildly reduced at  45%.  She has a CT coronary angiogram from 2017 that demonstrated no obstructive coronary artery disease at that time.  Review of Systems:   General: WNL  Eyes: WNL  Ears/Nose/Throat: WNL  Lungs: WNL  Heart: WNL  Stomach: WNL  Bladder: WNL  Muscle/Joints: WNL  Skin: WNL  Nervous System: WNL  Mental Health: WNL     Blood: WNL      Problem List:    Patient Active Problem List   Diagnosis   ? Symptomatic Menopause   ? Osteopenia   ? Ganglion Of The Left Foot   ? Sick Sinus Syndrome   ? Insomnia   ? Backache   ? S/P total knee arthroplasty   ? TGA (transient global amnesia)   ? H/O gastric bypass   ? Cardiac pacemaker in situ, dual chamber   ? Pulmonary embolism (H)   ? Hypertension   ? Hair loss   ? LV dysfunction       Social History     Socioeconomic History   ? Marital status:      Spouse name: Not on file   ? Number of children: Not on file   ? Years of education: Not on file   ? Highest education level: Not on file   Occupational History   ? Not on file   Social Needs   ? Financial resource strain: Not on file   ? Food insecurity     Worry: Not on file     Inability: Not on file   ? Transportation needs     Medical: Not on file     Non-medical: Not on file   Tobacco Use   ? Smoking status: Never Smoker   ? Smokeless tobacco: Never Used   Substance and Sexual Activity   ? Alcohol use: Yes     Comment: rare   ? Drug use: No   ? Sexual activity: Not on file   Lifestyle   ? Physical activity     Days per week: Not on file     Minutes per session: Not on file   ? Stress: Not on file   Relationships   ? Social connections     Talks on phone: Not on file     Gets together: Not on file     Attends Taoism service: Not on file     Active member of club or organization: Not on file     Attends meetings of clubs or organizations: Not on file     Relationship status: Not on file   ? Intimate partner violence     Fear of current or ex partner: Not on file     Emotionally abused: Not on file     Physically abused: Not  "on file     Forced sexual activity: Not on file   Other Topics Concern   ? Not on file   Social History Narrative   ? Not on file       Family History   Problem Relation Age of Onset   ? Heart attack Father    ? Diabetes Maternal Grandmother    ? Diabetes Brother        Current Outpatient Medications   Medication Sig Dispense Refill   ? ascorbic acid, vitamin C, (VITAMIN C) 100 MG tablet Take 100 mg by mouth daily.     ? benzonatate (TESSALON PERLES) 100 MG capsule Take 1 capsule (100 mg total) by mouth 3 (three) times a day as needed for cough. 30 capsule 0   ? biotin 2,500 mcg cap Take 1 capsule by mouth daily.      ? cholecalciferol, vitamin D3, 5,000 unit Tab Take 5,000 Units by mouth every morning.      ? codeine-guaiFENesin (GUAIFENESIN AC)  mg/5 mL liquid Take 5 mL by mouth 2 (two) times a day as needed for cough. 240 mL 0   ? cyclobenzaprine (FLEXERIL) 10 MG tablet Take 0.5 tablets (5 mg total) by mouth every 8 (eight) hours as needed for muscle spasms. 30 tablet 1   ? levothyroxine (SYNTHROID, LEVOTHROID) 25 MCG tablet TAKE 1 TABLET DAILY EXCEPT TAKE 2 TABLETS ON MONDAY AND FRIDAY 117 tablet 3   ? losartan (COZAAR) 25 MG tablet Take 1 tablet (25 mg total) by mouth daily. 90 tablet 3   ? lutein 20 mg cap Take 20 mg by mouth every morning.      ? multivit,stress formula-zinc (B COMPLEX-C-E-ZINC) Tab tablet Take 1 tablet on Monday, Friday 30 tablet 0   ? multivitamin therapeutic (THERAGRAN) tablet Take 1 tablet by mouth daily.     ? triamcinolone (KENALOG) 0.5 % ointment Apply topically 2 (two) times a day. 30 g 3   ? zolpidem (AMBIEN) 5 MG tablet TAKE 1 TABLET AT BEDTIME AS NEEDED FOR SLEEP 90 tablet 1     No current facility-administered medications for this visit.        Objective:     /72 (Patient Site: Right Arm, Patient Position: Sitting, Cuff Size: Adult Regular)   Pulse 75   Resp 16   Ht 5' 3.75\" (1.619 m)   Wt 151 lb (68.5 kg)   LMP 07/29/1988   SpO2 98%   BMI 26.12 kg/m    151 lb " (68.5 kg)   [unfilled]  Wt Readings from Last 3 Encounters:   08/10/20 151 lb (68.5 kg)   20 150 lb (68 kg)   19 147 lb 2 oz (66.7 kg)       Physical Exam:    GENERAL APPEARANCE: alert, no apparent distress  HEENT: no scleral icterus or xanthelasma  NECK: jugular venous pressure within normal limits  CHEST: symmetric, the lungs are clear to auscultation pacemaker site well-healed  CARDIOVASCULAR: regular rhythm without murmur, click, or gallop; no carotid bruits  Abdomen: No Organomegaly, masses, bruits, or tenderness. Bowels sounds are present      EXTREMITIES: no cyanosis, clubbing or edema    Cardiac Testing:  Reading physician: Kolton Grant MD  Ordering physician: Orlin Wells MD  Study date: 9/10/19    Performing Date  Performing Department    Sep 10, 2019   CARDIAC TESTING [[id number]]    Patient Information     Patient Name   Desi Vega  MRN   [id number]  Sex   Female   1   1950 (69 y.o.)    Indications     Dx: LV dysfunction [I51.9 (ICD-10-CM)]    Summary       When compared to the previous study dated 3/21/2016, no significant change.    Normal left ventricular size.    Left ventricle ejection fraction is mildly decreased. The estimated left ventricular ejection fraction is 45%.    Normal right ventricular size and systolic function.    No hemodynamically significant valvular heart abnormalities.    Pacemaker lead in right heart chambers        CTA Coronary   Order# [id number]   Reading physician: Carolin Hagen MD  Ordering physician: Orlin Wells MD  Study date: 17    Patient Information     Patient Name   Desi Vega  MRN   [id number]  Sex   Female   1   1950 (66 y.o.)    Indications     Dyspnea, cardiac origin suspected    Dx: Other nonspecific abnormal cardiovascular system function study [R94.39 (ICD-10-CM)]    Interpretation Summary       Normal coronary anatomy with no significant coronary artery disease detected in this  study.      Technical Details     TECHNIQUE:  Retrospective ECG gated CT scanning of the heart with intravenous contrast was performed on a Siemens Definition Flash CT scanner using 95 mL Omnipaque 350.  Spiral protocol for coronary CT angiography was performed after administering 0.4mg of sublingual nitroglycerin.  76.  Pulse range 50% - 70% of the cardiac phase. The imaging protocol was individualized to minimize radiation exposure.  Image post processing was performed on a [employer] workstation.  This study was performed after discussion of the goals, risk benefits and alternatives of the procedure.  Risks discussed included the risk of contrast injection and diagnostic x-ray exposure.  The best reconstructions were obtained at 64% of the diastolic phase and 50% of the systolic phase.    Noncoronary Findings     Left Ventricle  No left ventricular mass or thrombus.    Right Ventricle  Pacer wire present in the ventricle.    Left Atrium  No left atrial mass or thrombus.    Aorta  Normal caliber of the visualized proximal ascending aorta. Normal sized aortic root.    Aortic Valve  Tricuspid aortic valve.    Pulmonary Artery  Normal pulmonary artery and venous anatomy.  All pulmonary veins draining into the left atrium.    Pericardium  Normal pericardial thickness.No pericardial effusion.    Coronary           Lab Results:    Lab Results   Component Value Date     08/05/2020    K 4.9 08/05/2020     08/05/2020    CO2 28 08/05/2020    BUN 11 08/05/2020    CREATININE 0.73 08/05/2020    CALCIUM 9.1 08/05/2020     Lab Results   Component Value Date    CHOL 162 08/05/2020    TRIG 122 08/05/2020    HDL 62 08/05/2020     BNP (pg/mL)   Date Value   01/20/2016 13   11/18/2015 48   09/22/2015 259 (H)     Creatinine (mg/dL)   Date Value   08/05/2020 0.73   12/30/2019 0.82   06/03/2019 0.82   09/10/2018 0.65     LDL Calculated (mg/dL)   Date Value   08/05/2020 76   09/10/2018 104   07/17/2017 111     Lab Results    Component Value Date    WBC 4.8 09/10/2018    WBC 7.4 09/23/2015    HGB 14.6 09/10/2018    HCT 43.4 09/10/2018    MCV 97 09/10/2018     09/10/2018         This note has been dictated using voice recognition software. Any grammatical or context distortions are unintentional and inherent to the software.

## 2021-07-03 NOTE — ADDENDUM NOTE
Addendum Note by Razia Norwood CMA at 6/11/2018  3:09 PM     Author: Razia Norwood CMA Service: -- Author Type: Certified Medical Assistant    Filed: 6/11/2018  3:09 PM Encounter Date: 6/11/2018 Status: Signed    : Razia Norwood CMA (Certified Medical Assistant)    Addended by: RAZIA NORWOOD on: 6/11/2018 03:09 PM        Modules accepted: Orders

## 2021-07-06 DIAGNOSIS — Z00.00 HEALTHCARE MAINTENANCE: ICD-10-CM

## 2021-07-06 DIAGNOSIS — I10 ESSENTIAL HYPERTENSION: ICD-10-CM

## 2021-07-06 DIAGNOSIS — E03.9 HYPOTHYROIDISM, UNSPECIFIED TYPE: ICD-10-CM

## 2021-07-06 LAB
ALBUMIN SERPL-MCNC: 3.5 G/DL (ref 3.4–5)
ALP SERPL-CCNC: 62 U/L (ref 40–150)
ALT SERPL W P-5'-P-CCNC: 21 U/L (ref 0–50)
ANION GAP SERPL CALCULATED.3IONS-SCNC: 7 MMOL/L (ref 3–14)
AST SERPL W P-5'-P-CCNC: 18 U/L (ref 0–45)
BILIRUB SERPL-MCNC: 0.5 MG/DL (ref 0.2–1.3)
BUN SERPL-MCNC: 18 MG/DL (ref 7–30)
CALCIUM SERPL-MCNC: 9.7 MG/DL (ref 8.5–10.1)
CHLORIDE SERPL-SCNC: 105 MMOL/L (ref 94–109)
CHOLEST SERPL-MCNC: 230 MG/DL
CO2 SERPL-SCNC: 29 MMOL/L (ref 20–32)
CREAT SERPL-MCNC: 0.81 MG/DL (ref 0.52–1.04)
GFR SERPL CREATININE-BSD FRML MDRD: 72 ML/MIN/{1.73_M2}
GLUCOSE SERPL-MCNC: 99 MG/DL (ref 70–99)
HDLC SERPL-MCNC: 95 MG/DL
LDLC SERPL CALC-MCNC: 113 MG/DL
NONHDLC SERPL-MCNC: 135 MG/DL
POTASSIUM SERPL-SCNC: 3.4 MMOL/L (ref 3.4–5.3)
PROT SERPL-MCNC: 7.4 G/DL (ref 6.8–8.8)
SODIUM SERPL-SCNC: 141 MMOL/L (ref 133–144)
TRIGL SERPL-MCNC: 108 MG/DL
TSH SERPL DL<=0.005 MIU/L-ACNC: 1.71 MU/L (ref 0.4–4)

## 2021-07-06 PROCEDURE — 36415 COLL VENOUS BLD VENIPUNCTURE: CPT | Performed by: FAMILY MEDICINE

## 2021-07-06 PROCEDURE — 80061 LIPID PANEL: CPT | Performed by: FAMILY MEDICINE

## 2021-07-06 PROCEDURE — 84443 ASSAY THYROID STIM HORMONE: CPT | Performed by: FAMILY MEDICINE

## 2021-07-06 PROCEDURE — 80053 COMPREHEN METABOLIC PANEL: CPT | Performed by: FAMILY MEDICINE

## 2021-07-17 ENCOUNTER — MYC MEDICAL ADVICE (OUTPATIENT)
Dept: FAMILY MEDICINE | Facility: CLINIC | Age: 71
End: 2021-07-17

## 2021-07-19 NOTE — TELEPHONE ENCOUNTER
Call placed to patient regarding my chart messaging.  Scheduled visit 7/223/21 at 1520 as only appointment available this week.  Offered appointment with other providers at [place] if this time does not work.  Call back number given.  My chart message sent.  Estelle Mckeon RN

## 2021-07-23 ENCOUNTER — OFFICE VISIT (OUTPATIENT)
Dept: FAMILY MEDICINE | Facility: CLINIC | Age: 71
End: 2021-07-23
Payer: COMMERCIAL

## 2021-07-23 VITALS
TEMPERATURE: 97.9 F | DIASTOLIC BLOOD PRESSURE: 62 MMHG | BODY MASS INDEX: 20.49 KG/M2 | WEIGHT: 120 LBS | SYSTOLIC BLOOD PRESSURE: 122 MMHG | HEIGHT: 64 IN | RESPIRATION RATE: 12 BRPM | HEART RATE: 68 BPM

## 2021-07-23 DIAGNOSIS — R19.09 ABDOMINAL MASS OF OTHER SITE: Primary | ICD-10-CM

## 2021-07-23 PROBLEM — G47.00 INSOMNIA: Status: ACTIVE | Noted: 2021-07-23

## 2021-07-23 PROBLEM — I10 HYPERTENSION: Status: ACTIVE | Noted: 2017-09-25

## 2021-07-23 PROBLEM — M94.9 DISORDER OF BONE AND CARTILAGE: Status: ACTIVE | Noted: 2021-07-23

## 2021-07-23 PROBLEM — L65.9 HAIR LOSS: Status: ACTIVE | Noted: 2018-06-04

## 2021-07-23 PROBLEM — M67.40 GANGLION OF TENDON SHEATH: Status: ACTIVE | Noted: 2021-07-23

## 2021-07-23 PROBLEM — I51.9 LV DYSFUNCTION: Status: ACTIVE | Noted: 2020-06-12

## 2021-07-23 PROBLEM — M89.9 DISORDER OF BONE AND CARTILAGE: Status: ACTIVE | Noted: 2021-07-23

## 2021-07-23 PROBLEM — M54.9 BACKACHE: Status: ACTIVE | Noted: 2021-07-23

## 2021-07-23 PROBLEM — N95.1 SYMPTOMATIC MENOPAUSAL OR FEMALE CLIMACTERIC STATES: Status: ACTIVE | Noted: 2021-07-23

## 2021-07-23 PROCEDURE — 99213 OFFICE O/P EST LOW 20 MIN: CPT | Performed by: FAMILY MEDICINE

## 2021-07-23 RX ORDER — FLUOROURACIL 50 MG/G
CREAM TOPICAL
COMMUNITY
Start: 2021-01-18 | End: 2021-11-08

## 2021-07-23 RX ORDER — HYDROCORTISONE 25 MG/G
OINTMENT TOPICAL
COMMUNITY
Start: 2021-03-19 | End: 2021-11-08

## 2021-07-23 ASSESSMENT — MIFFLIN-ST. JEOR: SCORE: 1030.35

## 2021-07-24 NOTE — PROGRESS NOTES
Assessment/Plan:    Desi Vega is a 71 year old female presenting for:    Abdominal mass of other site  After verbal consent was obtained debris was removed from umbilicus. The seem to be some scab type of tissue. Reassurance was overall given that I think the firmness she feels is likely some scar tissue from her surgically created umbilicus and is nothing to be concerned about. Likely she is feeling it now due to her weight loss.      Medications Discontinued During This Encounter   Medication Reason     benzonatate (TESSALON) 100 MG capsule            Chief Complaint:  Mass        Subjective:   Desi Vega is a very pleasant 71-year-old female presenting to the clinic today for concerns over a small mass in her abdomen at her bellybutton.    Patient has lost a significant amount of weight over the last year. 2 days ago she noticed a small somewhat firm mass by her bellybutton. It is notable that about 20 years ago she had a tummy tuck and had a naval reconstruction and so this is not her actual nipple.    She notes that the area is slightly firm. It is not tender. She also notes that she saw some scab tissue in her bellybutton and has been trying to pick at it but has been unsuccessful.    She has not noted that the area becomes  with increased intra-abdominal pressure. It is not painful.      12 point review of systems completed and negative except for what has been described above    History   Smoking Status     Never Smoker   Smokeless Tobacco     Never Used         Current Outpatient Medications:      amoxicillin (AMOXIL) 500 MG capsule, 2,000mg oral once prior to dental procedure, Disp: 16 capsule, Rfl: 0     biotin 2.5 MG CAPS, Take 1 capsule by mouth, Disp: , Rfl:      COZAAR 25 MG tablet, Take 1 tablet (25 mg) by mouth daily, Disp: 90 tablet, Rfl: 3     fluorouracil (EFUDEX) 5 % external cream, , Disp: , Rfl:      hydrocortisone 2.5 % ointment, APPLY THIN LAYER EXTERNALLY TO THE  "AFFECTED AREA 1 TO 2 TIMES EVERY DAY, Disp: , Rfl:      levothyroxine (SYNTHROID/LEVOTHROID) 25 MCG tablet, 2 tablets on Monday and Friday and 1 tablet on all other days, Disp: 108 tablet, Rfl: 3     losartan (COZAAR) 25 MG tablet, Take 1 tablet (25 mg) by mouth daily, Disp: 90 tablet, Rfl: 3     Lutein 20 MG CAPS, Take 20 mg by mouth, Disp: , Rfl:      Multiple Vitamins-Minerals (ONCOVITE) TABS, Take 1 tablet by mouth, Disp: , Rfl:      omeprazole (PRILOSEC) 20 MG DR capsule, Take 1 capsule (20 mg) by mouth daily, Disp: 90 capsule, Rfl: 3     triamcinolone (KENALOG) 0.5 % external ointment, , Disp: , Rfl:      vitamin D3 (CHOLECALCIFEROL) 125 MCG (5000 UT) tablet, Take 5,000 Units by mouth, Disp: , Rfl:       Objective:  Vitals:    07/23/21 1522   BP: 122/62   Pulse: 68   Resp: 12   Temp: 97.9  F (36.6  C)   TempSrc: Tympanic   Weight: 54.4 kg (120 lb)   Height: 1.619 m (5' 3.75\")       Body mass index is 20.76 kg/m .    Vital signs reviewed and stable  General: No acute distress  Psych: Appropriate affect  HEENT: moist mucous membranes  Abdomen: soft, non tender, non distended with normo-active bowel sounds, small area of firmness just above her umbilicus however does not seem overly concerning. Does not reduce such as a hernia might. Is not tender. She does have some debris in her umbilicus.  Extremities: warm and well perfused with no edema  Skin: warm and dry with no rash         This note has been dictated and transcribed using voice recognition software.   Any errors in transcription are unintentional and inherent to the software.  "

## 2021-08-19 DIAGNOSIS — E03.9 HYPOTHYROIDISM, UNSPECIFIED TYPE: ICD-10-CM

## 2021-08-22 RX ORDER — LEVOTHYROXINE SODIUM 25 UG/1
TABLET ORAL
Qty: 108 TABLET | Refills: 3 | Status: SHIPPED | OUTPATIENT
Start: 2021-08-22 | End: 2021-11-08

## 2021-09-14 ENCOUNTER — MYC MEDICAL ADVICE (OUTPATIENT)
Dept: FAMILY MEDICINE | Facility: CLINIC | Age: 71
End: 2021-09-14

## 2021-10-21 DIAGNOSIS — K21.00 GASTROESOPHAGEAL REFLUX DISEASE WITH ESOPHAGITIS WITHOUT HEMORRHAGE: ICD-10-CM

## 2021-10-21 DIAGNOSIS — I10 ESSENTIAL HYPERTENSION: ICD-10-CM

## 2021-10-22 RX ORDER — LOSARTAN POTASSIUM 25 MG/1
TABLET ORAL
Qty: 90 TABLET | Refills: 3 | Status: SHIPPED | OUTPATIENT
Start: 2021-10-22 | End: 2021-11-08

## 2021-10-22 NOTE — TELEPHONE ENCOUNTER
Routing refill request to provider for review/approval because:  Patient needs to be seen because:  Due for annual exam    Scot Thomas RN

## 2021-10-24 ENCOUNTER — HEALTH MAINTENANCE LETTER (OUTPATIENT)
Age: 71
End: 2021-10-24

## 2021-11-01 ENCOUNTER — ANCILLARY PROCEDURE (OUTPATIENT)
Dept: BONE DENSITY | Facility: CLINIC | Age: 71
End: 2021-11-01
Attending: FAMILY MEDICINE
Payer: COMMERCIAL

## 2021-11-01 DIAGNOSIS — Z00.00 HEALTHCARE MAINTENANCE: ICD-10-CM

## 2021-11-01 DIAGNOSIS — Z78.0 POST-MENOPAUSAL: ICD-10-CM

## 2021-11-01 PROCEDURE — 77080 DXA BONE DENSITY AXIAL: CPT | Mod: TC | Performed by: RADIOLOGY

## 2021-11-03 ENCOUNTER — LAB (OUTPATIENT)
Dept: LAB | Facility: CLINIC | Age: 71
End: 2021-11-03
Payer: COMMERCIAL

## 2021-11-03 DIAGNOSIS — E78.5 HYPERLIPIDEMIA LDL GOAL <130: ICD-10-CM

## 2021-11-03 LAB
CHOLEST SERPL-MCNC: 209 MG/DL
FASTING STATUS PATIENT QL REPORTED: YES
HDLC SERPL-MCNC: 115 MG/DL
LDLC SERPL CALC-MCNC: 67 MG/DL
NONHDLC SERPL-MCNC: 94 MG/DL
TRIGL SERPL-MCNC: 133 MG/DL

## 2021-11-03 PROCEDURE — 36415 COLL VENOUS BLD VENIPUNCTURE: CPT

## 2021-11-03 PROCEDURE — 80061 LIPID PANEL: CPT

## 2021-11-08 ENCOUNTER — OFFICE VISIT (OUTPATIENT)
Dept: FAMILY MEDICINE | Facility: CLINIC | Age: 71
End: 2021-11-08
Payer: COMMERCIAL

## 2021-11-08 VITALS
DIASTOLIC BLOOD PRESSURE: 68 MMHG | OXYGEN SATURATION: 99 % | WEIGHT: 128 LBS | HEIGHT: 64 IN | HEART RATE: 80 BPM | SYSTOLIC BLOOD PRESSURE: 120 MMHG | BODY MASS INDEX: 21.85 KG/M2 | TEMPERATURE: 97.3 F

## 2021-11-08 DIAGNOSIS — L30.9 DERMATITIS: ICD-10-CM

## 2021-11-08 DIAGNOSIS — Z00.00 ENCOUNTER FOR MEDICARE ANNUAL WELLNESS EXAM: Primary | ICD-10-CM

## 2021-11-08 DIAGNOSIS — E55.9 VITAMIN D DEFICIENCY: ICD-10-CM

## 2021-11-08 DIAGNOSIS — E78.5 HYPERLIPIDEMIA LDL GOAL <130: ICD-10-CM

## 2021-11-08 DIAGNOSIS — E03.9 HYPOTHYROIDISM, UNSPECIFIED TYPE: ICD-10-CM

## 2021-11-08 DIAGNOSIS — I10 ESSENTIAL HYPERTENSION: ICD-10-CM

## 2021-11-08 DIAGNOSIS — K21.00 GASTROESOPHAGEAL REFLUX DISEASE WITH ESOPHAGITIS WITHOUT HEMORRHAGE: ICD-10-CM

## 2021-11-08 PROCEDURE — 99397 PER PM REEVAL EST PAT 65+ YR: CPT | Performed by: FAMILY MEDICINE

## 2021-11-08 RX ORDER — LEVOTHYROXINE SODIUM 25 UG/1
TABLET ORAL
Qty: 108 TABLET | Refills: 3 | Status: SHIPPED | OUTPATIENT
Start: 2021-11-08 | End: 2022-06-20

## 2021-11-08 RX ORDER — LOSARTAN POTASSIUM 25 MG/1
25 TABLET ORAL DAILY
Qty: 90 TABLET | Refills: 3 | Status: SHIPPED | OUTPATIENT
Start: 2021-11-08 | End: 2022-06-20

## 2021-11-08 RX ORDER — TRIAMCINOLONE ACETONIDE 5 MG/G
OINTMENT TOPICAL
Qty: 15 G | Refills: 1 | Status: SHIPPED | OUTPATIENT
Start: 2021-11-08 | End: 2022-01-19

## 2021-11-08 SDOH — ECONOMIC STABILITY: FOOD INSECURITY: WITHIN THE PAST 12 MONTHS, THE FOOD YOU BOUGHT JUST DIDN'T LAST AND YOU DIDN'T HAVE MONEY TO GET MORE.: NEVER TRUE

## 2021-11-08 SDOH — ECONOMIC STABILITY: INCOME INSECURITY: IN THE LAST 12 MONTHS, WAS THERE A TIME WHEN YOU WERE NOT ABLE TO PAY THE MORTGAGE OR RENT ON TIME?: NO

## 2021-11-08 SDOH — ECONOMIC STABILITY: TRANSPORTATION INSECURITY
IN THE PAST 12 MONTHS, HAS LACK OF TRANSPORTATION KEPT YOU FROM MEETINGS, WORK, OR FROM GETTING THINGS NEEDED FOR DAILY LIVING?: NO

## 2021-11-08 SDOH — HEALTH STABILITY: PHYSICAL HEALTH: ON AVERAGE, HOW MANY DAYS PER WEEK DO YOU ENGAGE IN MODERATE TO STRENUOUS EXERCISE (LIKE A BRISK WALK)?: 0 DAYS

## 2021-11-08 SDOH — ECONOMIC STABILITY: INCOME INSECURITY: HOW HARD IS IT FOR YOU TO PAY FOR THE VERY BASICS LIKE FOOD, HOUSING, MEDICAL CARE, AND HEATING?: NOT HARD AT ALL

## 2021-11-08 SDOH — HEALTH STABILITY: PHYSICAL HEALTH: ON AVERAGE, HOW MANY MINUTES DO YOU ENGAGE IN EXERCISE AT THIS LEVEL?: 0 MIN

## 2021-11-08 SDOH — ECONOMIC STABILITY: TRANSPORTATION INSECURITY
IN THE PAST 12 MONTHS, HAS THE LACK OF TRANSPORTATION KEPT YOU FROM MEDICAL APPOINTMENTS OR FROM GETTING MEDICATIONS?: NO

## 2021-11-08 SDOH — ECONOMIC STABILITY: FOOD INSECURITY: WITHIN THE PAST 12 MONTHS, YOU WORRIED THAT YOUR FOOD WOULD RUN OUT BEFORE YOU GOT MONEY TO BUY MORE.: NEVER TRUE

## 2021-11-08 ASSESSMENT — ENCOUNTER SYMPTOMS
MYALGIAS: 0
DIARRHEA: 0
PARESTHESIAS: 0
FEVER: 0
DYSURIA: 0
DIZZINESS: 0
EYE PAIN: 0
NERVOUS/ANXIOUS: 0
SHORTNESS OF BREATH: 0
HEADACHES: 0
HEARTBURN: 0
WEAKNESS: 0
FREQUENCY: 0
HEMATOCHEZIA: 0
CONSTIPATION: 0
ABDOMINAL PAIN: 0
NAUSEA: 0
COUGH: 0
PALPITATIONS: 0
SORE THROAT: 0
BREAST MASS: 0
HEMATURIA: 0
CHILLS: 0
ARTHRALGIAS: 0
JOINT SWELLING: 0

## 2021-11-08 ASSESSMENT — SOCIAL DETERMINANTS OF HEALTH (SDOH)
HOW OFTEN DO YOU ATTEND CHURCH OR RELIGIOUS SERVICES?: MORE THAN 4 TIMES PER YEAR
HOW OFTEN DO YOU GET TOGETHER WITH FRIENDS OR RELATIVES?: TWICE A WEEK
IN A TYPICAL WEEK, HOW MANY TIMES DO YOU TALK ON THE PHONE WITH FAMILY, FRIENDS, OR NEIGHBORS?: TWICE A WEEK
DO YOU BELONG TO ANY CLUBS OR ORGANIZATIONS SUCH AS CHURCH GROUPS UNIONS, FRATERNAL OR ATHLETIC GROUPS, OR SCHOOL GROUPS?: NO

## 2021-11-08 ASSESSMENT — ACTIVITIES OF DAILY LIVING (ADL): CURRENT_FUNCTION: NO ASSISTANCE NEEDED

## 2021-11-08 ASSESSMENT — LIFESTYLE VARIABLES
HOW MANY STANDARD DRINKS CONTAINING ALCOHOL DO YOU HAVE ON A TYPICAL DAY: 1 OR 2
HOW OFTEN DO YOU HAVE A DRINK CONTAINING ALCOHOL: 2-4 TIMES A MONTH
HOW OFTEN DO YOU HAVE SIX OR MORE DRINKS ON ONE OCCASION: NEVER

## 2021-11-08 ASSESSMENT — PAIN SCALES - GENERAL: PAINLEVEL: MILD PAIN (3)

## 2021-11-08 ASSESSMENT — MIFFLIN-ST. JEOR: SCORE: 1066.63

## 2021-11-08 NOTE — PATIENT INSTRUCTIONS
Patient Education   Personalized Prevention Plan  You are due for the preventive services outlined below.  Your care team is available to assist you in scheduling these services.  If you have already completed any of these items, please share that information with your care team to update in your medical record.  Health Maintenance Due   Topic Date Due     ANNUAL REVIEW OF HM ORDERS  Never done     COVID-19 Vaccine (1) Never done     Flu Vaccine (1) 09/01/2021     FALL RISK ASSESSMENT  10/12/2021     Annual Wellness Visit  10/12/2021        At your visit today, we discussed your risk for falls and preventive options.    Fall Prevention  Falls often occur due to slipping, tripping or losing your balance. Millions of people fall every year and injure themselves. Here are ways to reduce your risk of falling again.     Think about your fall, was there anything that caused your fall that can be fixed, removed, or replaced?    Make your home safe by keeping walkways clear of objects you may trip over, such as electric cords.    Use non-slip pads under rugs. Don't use area rugs or small throw rugs.    Use non-slip mats in bathtubs and showers.    Install handrails and lights on staircases. The handrails should be on both sides of the stairs.    Don't walk in poorly lit areas.    Don't stand on chairs or wobbly ladders.    Use caution when reaching overhead or looking upward. This position can cause a loss of balance.    Be sure your shoes fit properly, have non-slip bottoms and are in good condition.     Wear shoes both inside and out. Don't go barefoot or wear slippers.    Be cautious when going up and down stairs, curbs, and when walking on uneven sidewalks.    If your balance is poor, consider using a cane or walker.    If your fall was related to alcohol use, stop or limit alcohol intake.     If your fall was related to use of sleeping medicines, talk to your healthcare provider about this. You may need to reduce  your dosage at bedtime if you awaken during the night to go to the bathroom.      To reduce the need for nighttime bathroom trips:  ? Don't drink fluids for several hours before going to bed  ? Empty your bladder before going to bed  ? Men can keep a urinal at the bedside    Stay as active as you can. Balance, flexibility, strength, and endurance all come from exercise. They all play a role in preventing falls. Ask your healthcare provider which types of activity are right for you.    Get your vision checked on a regular basis.    If you have pets, know where they are before you stand up or walk so you don't trip over them.    Use night lights.    Go over all your medicines with a pharmacist or other healthcare provider to see if any of them could make you more likely to fall.  [employer] last reviewed this educational content on 4/1/2018 2000-2021 [employer]. All rights reserved. This information is not intended as a substitute for professional medical care. Always follow your healthcare professional's instructions.

## 2021-11-08 NOTE — PROGRESS NOTES
"  SUBJECTIVE:   Desi Vega is a 71 year old female who presents for Preventive Visit.      Patient has been advised of split billing requirements and indicates understanding: Yes  Are you in the first 12 months of your Medicare Part B coverage?  No    Answers for HPI/ROS submitted by the patient on 11/8/2021  In general, how would you rate your overall physical health?: good  Frequency of exercise:: None  Do you usually eat at least 4 servings of fruit and vegetables a day, include whole grains & fiber, and avoid regularly eating high fat or \"junk\" foods? : No  Taking medications regularly:: Yes  Medication side effects:: None  Activities of Daily Living: no assistance needed  Home safety: no safety concerns identified  Hearing Impairment:: no hearing concerns  In the past 6 months, have you been bothered by leaking of urine?: Yes  abdominal pain: No  Blood in stool: No  Blood in urine: No  chest pain: No  chills: No  congestion: No  constipation: No  cough: No  diarrhea: No  dizziness: No  ear pain: No  eye pain: No  nervous/anxious: No  fever: No  frequency: No  genital sores: No  headaches: No  hearing loss: No  heartburn: No  arthralgias: No  joint swelling: No  peripheral edema: No  mood changes: No  myalgias: No  nausea: No  dysuria: No  palpitations: No  Skin sensation changes: No  sore throat: No  urgency: No  rash: No  shortness of breath: No  visual disturbance: No  weakness: No  pelvic pain: No  vaginal bleeding: No  vaginal discharge: No  tenderness: No  breast mass: No  breast discharge: No  In general, how would you rate your overall mental or emotional health?: good  Additional concerns today:: No      PHQ-2 Score: (P) 1    Do you feel safe in your environment? Yes    Have you ever done Advance Care Planning? (For example, a Health Directive, POLST, or a discussion with a medical provider or your loved ones about your wishes): Yes, patient states has an Advance Care Planning document and " will bring a copy to the clinic.    Additional concerns to address?  No    Fall risk:  Fallen 2 or more times in the past year?: Yes  Any fall with injury in the past year?: Yes    Cognitive Screenin) Repeat 3 items (Leader, Season, Table)    2) Clock draw: NORMAL  3) 3 item recall: Recalls 3 objects  Results: 3 items recalled: COGNITIVE IMPAIRMENT LESS LIKELY    Mini-CogTM Copyright EUGENIO Bowden. Licensed by the author for use in [facility]; reprinted with permission (iman@[facility]). All rights reserved.      Do you have sleep apnea, excessive snoring or daytime drowsiness?: no            Reviewed and updated as needed this visit by clinical staff  Tobacco  Allergies  Meds   Med Hx  Surg Hx  Fam Hx  Soc Hx       Reviewed and updated as needed this visit by Provider               Social History     Tobacco Use     Smoking status: Never Smoker     Smokeless tobacco: Never Used   Substance Use Topics     Alcohol use: Not on file                           Current providers sharing in care for this patient include:   Patient Care Team:  Roz Acevedo MD as PCP - General  Roz Acevedo MD as Assigned PCP  Orlin Wells MD as Assigned Heart and Vascular Provider    The following health maintenance items are reviewed in Epic and correct as of today:  Health Maintenance   Topic Date Due     ANNUAL REVIEW OF HM ORDERS  Never done     INFLUENZA VACCINE (1) 2021     FALL RISK ASSESSMENT  10/12/2021     COLORECTAL CANCER SCREENING  2022     MEDICARE ANNUAL WELLNESS VISIT  2022     MAMMO SCREENING  2022     DTAP/TDAP/TD IMMUNIZATION (2 - Td or Tdap) 2026     LIPID  2026     ADVANCE CARE PLANNING  2026     DEXA  2036     HEPATITIS C SCREENING  Completed     PHQ-2  Completed     Pneumococcal Vaccine: 65+ Years  Completed     ZOSTER IMMUNIZATION  Completed     COVID-19 Vaccine  Completed     IPV IMMUNIZATION  Aged Out     MENINGITIS  "IMMUNIZATION  Aged Out     HEPATITIS B IMMUNIZATION  Aged Out     Labs reviewed in [facility]  Mammogram Screening: has scheduled    ROS:  Constitutional, HEENT, cardiovascular, pulmonary, GI, , musculoskeletal, neuro, skin, endocrine and psych systems are negative, except as otherwise noted.    OBJECTIVE:   /68   Pulse 80   Temp 97.3  F (36.3  C) (Tympanic)   Ht 1.619 m (5' 3.75\")   Wt 58.1 kg (128 lb)   SpO2 99%   BMI 22.14 kg/m   Estimated body mass index is 22.14 kg/m  as calculated from the following:    Height as of this encounter: 1.619 m (5' 3.75\").    Weight as of this encounter: 58.1 kg (128 lb).  EXAM:   Objective:  Vital signs reviewed and stable  General: No acute distress  Psych: Appropriate affect  HEENT: moist mucous membranes, pupils equal, round, reactive to light and accomodation, tympanic membranes are pearly grey bilaterally  Lymph: no cervical or supraclavicular lymphadenopathy  Cardiovascular: regular rate and rhythm with no murmur  Pulmonary: clear to auscultation bilaterally with no wheeze  Abdomen: soft, non tender, non distended with normo-active bowel sounds  Extremities: warm and well perfused with no edema  Skin: warm and dry with no rash      Diagnostic Test Results:  Labs reviewed in Epic    ASSESSMENT / PLAN:       ICD-10-CM    1. Encounter for Medicare annual wellness exam  Z00.00    2. Essential hypertension  I10 losartan (COZAAR) 25 MG tablet     Comprehensive metabolic panel (BMP + Alb, Alk Phos, ALT, AST, Total. Bili, TP)   3. Hypothyroidism, unspecified type  E03.9 levothyroxine (SYNTHROID/LEVOTHROID) 25 MCG tablet     TSH   4. Gastroesophageal reflux disease with esophagitis without hemorrhage  K21.00 omeprazole (PRILOSEC) 20 MG DR capsule   5. Dermatitis  L30.9 triamcinolone (KENALOG) 0.5 % external ointment   6. Vitamin D deficiency  E55.9 Vitamin D Deficiency   7. Hyperlipidemia LDL goal <130  E78.5 Lipid panel reflex to direct LDL Fasting   Doing well - will return " "in 6 months for labs  Recently fell off a curb and broke tailbone - healing    Patient has been advised of split billing requirements and indicates understanding: Yes    COUNSELING:  Reviewed preventive health counseling, as reflected in patient instructions    Estimated body mass index is 22.14 kg/m  as calculated from the following:    Height as of this encounter: 1.619 m (5' 3.75\").    Weight as of this encounter: 58.1 kg (128 lb).        She reports that she has never smoked. She has never used smokeless tobacco.    Appropriate preventive services were discussed with this patient, including applicable screening as appropriate for cardiovascular disease, diabetes, osteopenia/osteoporosis, and glaucoma.  As appropriate for age/gender, discussed screening for colorectal cancer, prostate cancer, breast cancer, and cervical cancer. Checklist reviewing preventive services available has been given to the patient.    Reviewed patients plan of care and provided an AVS. The Basic Care Plan (routine screening as documented in Health Maintenance) for Desi meets the Care Plan requirement. This Care Plan has been established and reviewed with the Patient.    Counseling Resources:  ATP IV Guidelines  Pooled Cohorts Equation Calculator  Breast Cancer Risk Calculator  BRCA-Related Cancer Risk Assessment: FHS-7 Tool  FRAX Risk Assessment  ICSI Preventive Guidelines  Dietary Guidelines for Americans, 2010  USDA's MyPlate  ASA Prophylaxis  Lung CA Screening    Roz Acevedo MD  [facility]  "

## 2021-11-30 ENCOUNTER — OFFICE VISIT (OUTPATIENT)
Dept: CARDIOLOGY | Facility: CLINIC | Age: 71
End: 2021-11-30
Attending: INTERNAL MEDICINE

## 2021-11-30 ENCOUNTER — ANCILLARY PROCEDURE (OUTPATIENT)
Dept: CARDIOLOGY | Facility: CLINIC | Age: 71
End: 2021-11-30
Attending: INTERNAL MEDICINE
Payer: COMMERCIAL

## 2021-11-30 VITALS
WEIGHT: 127 LBS | HEART RATE: 68 BPM | HEIGHT: 64 IN | SYSTOLIC BLOOD PRESSURE: 110 MMHG | BODY MASS INDEX: 21.68 KG/M2 | DIASTOLIC BLOOD PRESSURE: 68 MMHG

## 2021-11-30 DIAGNOSIS — Z82.49 FAMILY HISTORY OF ABDOMINAL AORTIC ANEURYSM (AAA): Primary | ICD-10-CM

## 2021-11-30 DIAGNOSIS — K35.80 ACUTE APPENDICITIS: ICD-10-CM

## 2021-11-30 DIAGNOSIS — Z95.0 CARDIAC PACEMAKER IN SITU: ICD-10-CM

## 2021-11-30 DIAGNOSIS — Z95.0 CARDIAC PACEMAKER IN SITU: Primary | ICD-10-CM

## 2021-11-30 DIAGNOSIS — I10 PRIMARY HYPERTENSION: ICD-10-CM

## 2021-11-30 DIAGNOSIS — I49.5 SICK SINUS SYNDROME (H): ICD-10-CM

## 2021-11-30 DIAGNOSIS — I51.9 LV DYSFUNCTION: ICD-10-CM

## 2021-11-30 PROCEDURE — 99214 OFFICE O/P EST MOD 30 MIN: CPT | Performed by: INTERNAL MEDICINE

## 2021-11-30 PROCEDURE — 93280 PM DEVICE PROGR EVAL DUAL: CPT | Performed by: INTERNAL MEDICINE

## 2021-11-30 RX ORDER — HYDROCODONE BITARTRATE AND ACETAMINOPHEN 5; 325 MG/1; MG/1
TABLET ORAL
COMMUNITY
Start: 2021-09-18 | End: 2021-11-30

## 2021-11-30 ASSESSMENT — MIFFLIN-ST. JEOR: SCORE: 1066.07

## 2021-11-30 NOTE — PROGRESS NOTES
In clinic device check with Dr Wells.  Please see link for full device report.  Patient was informed of results and next follow up during today's visit.

## 2021-11-30 NOTE — PROGRESS NOTES
HEART CARE ENCOUNTER CONSULTATON NOTE      [facility]  [phone]      Assessment/Recommendations   Assessment/Plan:  1.  Sick sinus syndrome.  Pacemaker interrogation today as outlined below.  Normal pacemaker function.    2.  Mild LV dysfunction.  Echocardiogram from 1 year ago reporting ejection fraction 50% which is stable.  She has been on losartan for hypertension.  On that examination RV systolic function was said to be normal with mild to moderate tricuspid regurgitation and an estimate of RV systolic pressure of 24 mmHg.  She has no symptoms or findings of congestive heart failure and will monitor for symptoms.    3.  Family history of abdominal aortic aneurysm.  Plan abdominal aortic ultrasound  4.  Hypertension.  Blood pressure appears to be well controlled with the current dose of losartan.  Blood pressure today 110/68, blood pressure during recent visit with her primary care provider 120/68.    Plan continue with routine follow-up pacemaker device interrogation.  2.  Ultrasound of the abdominal aorta given family history of abdominal aortic aneurysm  3.  Follow-up in 1 year.     History of Present Illness/Subjective    HPI: Desi Vega is a 71 year old female is seen in follow-up today.  She underwent in office interrogation of her pacemaker today.  This demonstrates normal pacemaker function without documented dysrhythmia.  She is pacing 73% in the atrium and minimally in the ventricle.  She reports feeling well.  She specifically denies chest pain, shortness of breath, dizziness or lightheadedness.  She has not monitored her blood pressure as regularly more recently and I did encourage her to consider monitoring her blood pressure a few times per month.  She has a history of mild LV dysfunction with the most recent echocardiogram October 2020 reporting an EF of 50%.  This compares to 45% in September 2019.  She had a CT coronary angiogram from 2017 and demonstrated no  obstructive coronary artery disease.  She has a history of pulmonary embolism after knee replacement surgery in 2015 with these echocardiogram in October 2020 demonstrating normal estimate of RV systolic pressure.  She does report that her mother and brother both had a history of abdominal aortic aneurysm and its been greater than 5 years since she has been evaluated.    Recent Echocardiogram Results:  Echocardiogram Complete  Order: [id number]  Status: Final result    Visible to patient: Yes (not seen)    Next appt: 11/30/2021 at 07:50 AM in Cardiology (The Device Nurse)    Dx: Secondary cardiomyopathy (H)    1 Result Note    Details    Reading Physician Reading Date Result Priority   Bhavik Esposito MD  [phone] 10/13/2020 Routine   Provider, Historical 10/13/2020      Narrative & Impression    Left ventricular ejection fraction is mildly decreased. The estimated left ventricular ejection fraction is 50%.    Normal left ventricular cavity size and wall thickness.    Grade I left ventricular diastolic dysfunction is present with normal estimated left atrial pressure.    Normal right ventricular size and systolic function.    Mild to moderate tricuspid valve regurgitation.    The estimated systolic pulmonary artery pressure is normal at 24 mm Hg.    When compared to the previous study dated 9/10/2019, visually the LVEF appears similar.         Recent Coronary Angiogram Results:    Exam Date Exam Time Exam Date Exam Time Accession # Performing Department Results    4/11/17 10:11 AM 4/11/17 11:21 AM [id number] [facility]        [id number]         PACS Images     Show images for CTA Angiogram coronary artery    Study Result    Narrative & Impression     Normal coronary anatomy with no significant coronary artery disease detected in this study.           5/25/21 11:45 AM [id number] [facility]      [id number]         Linked Documents    View Image      Narrative & Impression    Type: routine pacemaker remote transmission.   Presenting rhythm: sinus 85 bpm.  Battery/lead status: stable.  Arrhythmias: since 1/11/21; none detected.  Comments: Normal magnet and pacemaker function. VENITA             Physical Examination  Review of Systems   Vitals: 110/68, weight 127 pounds, heart rate of 68 and regular.  Wt Readings from Last 3 Encounters:   11/08/21 58.1 kg (128 lb)   07/23/21 54.4 kg (120 lb)   01/06/21 60.9 kg (134 lb 4 oz)       General Appearance:   no distress, normal body habitus   ENT/Mouth: membranes moist, no oral lesions or bleeding gums.      EYES:  no scleral icterus, normal conjunctivae   Neck: no carotid bruits or thyromegaly   Chest/Lungs:   lungs are clear to auscultation, no rales or wheezing, well-healed pacemaker site., equal chest wall expansion    Cardiovascular:   Regular. Normal first and second heart sounds with no murmurs, rubs, or gallops; the carotid, radial and posterior tibial pulses are intact, Jugular venous pressure within normal limits, no significant edema bilaterally    Abdomen:  no organomegaly, masses, bruits, or tenderness; bowel sounds are present   Extremities: no cyanosis or clubbing   Skin: no xanthelasma, warm.    Neurologic: normal  bilateral, no tremors     Psychiatric: alert and oriented x3, calm        Please refer above for cardiac ROS details.        Medical History  Surgical History Family History Social History   Past Medical History:   Diagnosis Date     Anhedonia      Arthritis      Diarrhea     thought to be due to gastric bypass     Grief reaction      History of palpitations      Hyperlipidemia      Insomnia      Osteopenia      Osteopenia      Shortness of breath      Sick sinus syndrome (H)     bradycardia - pacemaker placed     TGA (transient global amnesia) 2004    Transient global amnesia - on plavix     Tinnitus      Urinary frequency     with some leakage     Past Surgical History:   Procedure  Laterality Date     ANKLE SURGERY       C REPAIR OF RECTOCELE      Description: Rectocele Repair;  Proc Date: 01/01/2002;     C TOTAL ABDOM HYSTERECTOMY      Description: Hysterectomy;  Proc Date: 01/01/1988;  Comments: for menorrhagia, still has ovaries     CHOLECYSTECTOMY       GASTRIC BYPASS       HC CORRECT BUNION,DOUBLE OSTEOTOMY      Description: Hallux Valgus (Bunion) Correction;  Proc Date: 01/01/2004;     HC OPEN TREATMENT PROXIMAL FIBULA/SHAFT FRACTURE      Description: Open Treatment Of Fracture Of Fibular Shaft;  Recorded: 06/11/2013;     HC REMOVAL GALLBLADDER      Description: Cholecystectomy;  Proc Date: 01/01/1987;     HEMORRHOIDECTOMY INTERNAL LIGATION      Description: Hemorrhoidectomy;  Proc Date: 01/01/2012;     HYSTERECTOMY  1988     IMPLANT PACEMAKER  1999     JOINT REPLACEMENT Left      MAMMOPLASTY REDUCTION Bilateral 1997     DC LAP,URETHRAL SUSPENSION      Description: Laparoscopic Urethral Suspension For Stress Incontinence;  Proc Date: 01/01/2002;     TOTAL KNEE ARTHROPLASTY Right 9/10/2015    Done by Dr. Gutierrez, [facility]     Family History   Problem Relation Age of Onset     Coronary Artery Disease Father      Diabetes Maternal Grandmother      Diabetes Brother         Social History     Socioeconomic History     Marital status:      Spouse name: Not on file     Number of children: Not on file     Years of education: Not on file     Highest education level: Not on file   Occupational History     Not on file   Tobacco Use     Smoking status: Never Smoker     Smokeless tobacco: Never Used   Substance and Sexual Activity     Alcohol use: Not on file     Drug use: Not on file     Sexual activity: Not on file   Other Topics Concern     Not on file   Social History Narrative     Not on file     Social Determinants of Health     Financial Resource Strain: Low Risk      Difficulty of Paying Living Expenses: Not hard at all   Food Insecurity: No Food Insecurity     Worried About  Running Out of Food in the Last Year: Never true     Ran Out of Food in the Last Year: Never true   Transportation Needs: No Transportation Needs     Lack of Transportation (Medical): No     Lack of Transportation (Non-Medical): No   Physical Activity: Inactive     Days of Exercise per Week: 0 days     Minutes of Exercise per Session: 0 min   Stress: No Stress Concern Present     Feeling of Stress : Only a little   Social Connections: Moderately Isolated     Frequency of Communication with Friends and Family: Twice a week     Frequency of Social Gatherings with Friends and Family: Twice a week     Attends L[employer] day Services: More than 4 times per year     Active Member of Clubs or Organizations: No     Attends Club or Organization Meetings: Not on file     Marital Status:    Intimate Partner Violence: Not on file   Housing Stability: Low Risk      Unable to Pay for Housing in the Last Year: No     Number of Places Lived in the Last Year: 1     Unstable Housing in the Last Year: No           Medications  Allergies   Current Outpatient Medications   Medication Sig Dispense Refill     biotin 2.5 MG CAPS Take 1 capsule by mouth       COZAAR 25 MG tablet Take 1 tablet (25 mg) by mouth daily 90 tablet 3     levothyroxine (SYNTHROID/LEVOTHROID) 25 MCG tablet TAKE 2 TABLETS BY MOUTH ON  MONDAY AND FRIDAY AND 1  TABLET BY MOUTH ON ALL  OTHER DAYS 108 tablet 3     losartan (COZAAR) 25 MG tablet Take 1 tablet (25 mg) by mouth daily 90 tablet 3     Lutein 20 MG CAPS Take 20 mg by mouth       Multiple Vitamins-Minerals (ONCOVITE) TABS Take 1 tablet by mouth       omeprazole (PRILOSEC) 20 MG DR capsule Take 1 capsule (20 mg) by mouth daily 90 capsule 3     triamcinolone (KENALOG) 0.5 % external ointment Use to leg twice daily as needed 15 g 1     vitamin D3 (CHOLECALCIFEROL) 125 MCG (5000 UT) tablet Take 5,000 Units by mouth         Allergies   Allergen Reactions     Aspirin Swelling     Bee Venom Swelling     Throat  swelling, hands swelling     Fire Ant           Lab Results    Chemistry/lipid CBC Cardiac Enzymes/BNP/TSH/INR   Recent Labs   Lab Test 11/03/21  1006   CHOL 209*      LDL 67   TRIG 133     Recent Labs   Lab Test 11/03/21  1006 07/06/21  1049 08/05/20  1240   LDL 67 113* 76     Recent Labs   Lab Test 07/06/21  1049      POTASSIUM 3.4   CHLORIDE 105   CO2 29   GLC 99   BUN 18   CR 0.81   GFRESTIMATED 72   MATT 9.7     Recent Labs   Lab Test 07/06/21  1049 08/05/20  1240 12/30/19  1325   CR 0.81 0.73 0.82     Recent Labs   Lab Test 09/10/18  1015 06/04/18  1457 07/17/17  0941   A1C 5.5 5.5 6.0          Recent Labs   Lab Test 09/10/18  1015   WBC 4.8   HGB 14.6   HCT 43.4   MCV 97        Recent Labs   Lab Test 09/10/18  1015 04/09/18  1003 01/22/18  0843   HGB 14.6 14.2 15.1    No results for input(s): TROPONINI in the last 02659 hours.  No results for input(s): BNP, NTBNPI, NTBNP in the last 57049 hours.  Recent Labs   Lab Test 07/06/21  1049   TSH 1.71     No results for input(s): INR in the last 26860 hours.     Orlin Wells MD

## 2021-11-30 NOTE — PATIENT INSTRUCTIONS
Please call my nurse Evette with any heart related questions.Please consider checking your blood pressure a few times per month.Her number is [phone].Please write on my chart or call my nurse Evette with any questions.We are planning an abdominal ultrasound given your family history of AAA.

## 2021-11-30 NOTE — LETTER
11/30/2021    Roz Acevedo MD  [id number] [facility] [address]    RE: Desi Vega       Dear Colleague,    I had the pleasure of seeing Desi Vega in the [facility].    HEART CARE ENCOUNTER CONSULTATON NOTE      [facility]  [phone]      Assessment/Recommendations   Assessment/Plan:  1.  Sick sinus syndrome.  Pacemaker interrogation today as outlined below.  Normal pacemaker function.    2.  Mild LV dysfunction.  Echocardiogram from 1 year ago reporting ejection fraction 50% which is stable.  She has been on losartan for hypertension.  On that examination RV systolic function was said to be normal with mild to moderate tricuspid regurgitation and an estimate of RV systolic pressure of 24 mmHg.  She has no symptoms or findings of congestive heart failure and will monitor for symptoms.    3.  Family history of abdominal aortic aneurysm.  Plan abdominal aortic ultrasound  4.  Hypertension.  Blood pressure appears to be well controlled with the current dose of losartan.  Blood pressure today 110/68, blood pressure during recent visit with her primary care provider 120/68.    Plan continue with routine follow-up pacemaker device interrogation.  2.  Ultrasound of the abdominal aorta given family history of abdominal aortic aneurysm  3.  Follow-up in 1 year.     History of Present Illness/Subjective    HPI: Desi Vega is a 71 year old female is seen in follow-up today.  She underwent in office interrogation of her pacemaker today.  This demonstrates normal pacemaker function without documented dysrhythmia.  She is pacing 73% in the atrium and minimally in the ventricle.  She reports feeling well.  She specifically denies chest pain, shortness of breath, dizziness or lightheadedness.  She has not monitored her blood pressure as regularly more recently and I did encourage her to consider monitoring  her blood pressure a few times per month.  She has a history of mild LV dysfunction with the most recent echocardiogram October 2020 reporting an EF of 50%.  This compares to 45% in September 2019.  She had a CT coronary angiogram from 2017 and demonstrated no obstructive coronary artery disease.  She has a history of pulmonary embolism after knee replacement surgery in 2015 with these echocardiogram in October 2020 demonstrating normal estimate of RV systolic pressure.  She does report that her mother and brother both had a history of abdominal aortic aneurysm and its been greater than 5 years since she has been evaluated.    Recent Echocardiogram Results:  Echocardiogram Complete  Order: [id number]   Status: Final result     Visible to patient: Yes (not seen)     Next appt: 11/30/2021 at 07:50 AM in Cardiology (The Device Nurse)     Dx: Secondary cardiomyopathy (H)     1 Result Note    Details    Reading Physician Reading Date Result Priority   Bhavik Esposito MD  [phone] 10/13/2020 Routine   Provider, Historical 10/13/2020      Narrative & Impression    Left ventricular ejection fraction is mildly decreased. The estimated left ventricular ejection fraction is 50%.    Normal left ventricular cavity size and wall thickness.    Grade I left ventricular diastolic dysfunction is present with normal estimated left atrial pressure.    Normal right ventricular size and systolic function.    Mild to moderate tricuspid valve regurgitation.    The estimated systolic pulmonary artery pressure is normal at 24 mm Hg.    When compared to the previous study dated 9/10/2019, visually the LVEF appears similar.         Recent Coronary Angiogram Results:    Exam Date Exam Time Exam Date Exam Time Accession # Performing Department Results    4/11/17 10:11 AM 4/11/17 11:21 AM [id number] [facility]        [id number]         PACS Images     Show images for CTA Angiogram coronary artery    Study  Result    Narrative & Impression     Normal coronary anatomy with no significant coronary artery disease detected in this study.           5/25/21 11:45 AM [id number] [facility]      [id number]         Linked Documents    View Image     Narrative & Impression    Type: routine pacemaker remote transmission.   Presenting rhythm: sinus 85 bpm.  Battery/lead status: stable.  Arrhythmias: since 1/11/21; none detected.  Comments: Normal magnet and pacemaker function. VENITA             Physical Examination  Review of Systems   Vitals: 110/68, weight 127 pounds, heart rate of 68 and regular.  Wt Readings from Last 3 Encounters:   11/08/21 58.1 kg (128 lb)   07/23/21 54.4 kg (120 lb)   01/06/21 60.9 kg (134 lb 4 oz)       General Appearance:   no distress, normal body habitus   ENT/Mouth: membranes moist, no oral lesions or bleeding gums.      EYES:  no scleral icterus, normal conjunctivae   Neck: no carotid bruits or thyromegaly   Chest/Lungs:   lungs are clear to auscultation, no rales or wheezing, well-healed pacemaker site., equal chest wall expansion    Cardiovascular:   Regular. Normal first and second heart sounds with no murmurs, rubs, or gallops; the carotid, radial and posterior tibial pulses are intact, Jugular venous pressure within normal limits, no significant edema bilaterally    Abdomen:  no organomegaly, masses, bruits, or tenderness; bowel sounds are present   Extremities: no cyanosis or clubbing   Skin: no xanthelasma, warm.    Neurologic: normal  bilateral, no tremors     Psychiatric: alert and oriented x3, calm        Please refer above for cardiac ROS details.        Medical History  Surgical History Family History Social History   Past Medical History:   Diagnosis Date     Anhedonia      Arthritis      Diarrhea     thought to be due to gastric bypass     Grief reaction      History of palpitations      Hyperlipidemia      Insomnia      Osteopenia      Osteopenia       Shortness of breath      Sick sinus syndrome (H)     bradycardia - pacemaker placed     TGA (transient global amnesia) 2004    Transient global amnesia - on plavix     Tinnitus      Urinary frequency     with some leakage     Past Surgical History:   Procedure Laterality Date     ANKLE SURGERY       C REPAIR OF RECTOCELE      Description: Rectocele Repair;  Proc Date: 01/01/2002;     C TOTAL ABDOM HYSTERECTOMY      Description: Hysterectomy;  Proc Date: 01/01/1988;  Comments: for menorrhagia, still has ovaries     CHOLECYSTECTOMY       GASTRIC BYPASS       HC CORRECT BUNION,DOUBLE OSTEOTOMY      Description: Hallux Valgus (Bunion) Correction;  Proc Date: 01/01/2004;     HC OPEN TREATMENT PROXIMAL FIBULA/SHAFT FRACTURE      Description: Open Treatment Of Fracture Of Fibular Shaft;  Recorded: 06/11/2013;     HC REMOVAL GALLBLADDER      Description: Cholecystectomy;  Proc Date: 01/01/1987;     HEMORRHOIDECTOMY INTERNAL LIGATION      Description: Hemorrhoidectomy;  Proc Date: 01/01/2012;     HYSTERECTOMY  1988     IMPLANT PACEMAKER  1999     JOINT REPLACEMENT Left      MAMMOPLASTY REDUCTION Bilateral 1997     [place] LAP,URETHRAL SUSPENSION      Description: Laparoscopic Urethral Suspension For Stress Incontinence;  Proc Date: 01/01/2002;     TOTAL KNEE ARTHROPLASTY Right 9/10/2015    Done by Dr. Gutierrez, [facility]     Family History   Problem Relation Age of Onset     Coronary Artery Disease Father      Diabetes Maternal Grandmother      Diabetes Brother         Social History     Socioeconomic History     Marital status:      Spouse name: Not on file     Number of children: Not on file     Years of education: Not on file     Highest education level: Not on file   Occupational History     Not on file   Tobacco Use     Smoking status: Never Smoker     Smokeless tobacco: Never Used   Substance and Sexual Activity     Alcohol use: Not on file     Drug use: Not on file     Sexual activity: Not on file   Other  Topics Concern     Not on file   Social History Narrative     Not on file     Social Determinants of Health     Financial Resource Strain: Low Risk      Difficulty of Paying Living Expenses: Not hard at all   Food Insecurity: No Food Insecurity     Worried About Running Out of Food in the Last Year: Never true     Ran Out of Food in the Last Year: Never true   Transportation Needs: No Transportation Needs     Lack of Transportation (Medical): No     Lack of Transportation (Non-Medical): No   Physical Activity: Inactive     Days of Exercise per Week: 0 days     Minutes of Exercise per Session: 0 min   Stress: No Stress Concern Present     Feeling of Stress : Only a little   Social Connections: Moderately Isolated     Frequency of Communication with Friends and Family: Twice a week     Frequency of Social Gatherings with Friends and Family: Twice a week     Attends Sabianism Services: More than 4 times per year     Active Member of Clubs or Organizations: No     Attends Club or Organization Meetings: Not on file     Marital Status:    Intimate Partner Violence: Not on file   Housing Stability: Low Risk      Unable to Pay for Housing in the Last Year: No     Number of Places Lived in the Last Year: 1     Unstable Housing in the Last Year: No           Medications  Allergies   Current Outpatient Medications   Medication Sig Dispense Refill     biotin 2.5 MG CAPS Take 1 capsule by mouth       COZAAR 25 MG tablet Take 1 tablet (25 mg) by mouth daily 90 tablet 3     levothyroxine (SYNTHROID/LEVOTHROID) 25 MCG tablet TAKE 2 TABLETS BY MOUTH ON  MONDAY AND FRIDAY AND 1  TABLET BY MOUTH ON ALL  OTHER DAYS 108 tablet 3     losartan (COZAAR) 25 MG tablet Take 1 tablet (25 mg) by mouth daily 90 tablet 3     Lutein 20 MG CAPS Take 20 mg by mouth       Multiple Vitamins-Minerals (ONCOVITE) TABS Take 1 tablet by mouth       omeprazole (PRILOSEC) 20 MG DR capsule Take 1 capsule (20 mg) by mouth daily 90 capsule 3      triamcinolone (KENALOG) 0.5 % external ointment Use to leg twice daily as needed 15 g 1     vitamin D3 (CHOLECALCIFEROL) 125 MCG (5000 UT) tablet Take 5,000 Units by mouth         Allergies   Allergen Reactions     Aspirin Swelling     Bee Venom Swelling     Throat swelling, hands swelling     Fire Ant           Lab Results    Chemistry/lipid CBC Cardiac Enzymes/BNP/TSH/INR   Recent Labs   Lab Test 11/03/21  1006   CHOL 209*      LDL 67   TRIG 133     Recent Labs   Lab Test 11/03/21  1006 07/06/21  1049 08/05/20  1240   LDL 67 113* 76     Recent Labs   Lab Test 07/06/21  1049      POTASSIUM 3.4   CHLORIDE 105   CO2 29   GLC 99   BUN 18   CR 0.81   GFRESTIMATED 72   MATT 9.7     Recent Labs   Lab Test 07/06/21  1049 08/05/20  1240 12/30/19  1325   CR 0.81 0.73 0.82     Recent Labs   Lab Test 09/10/18  1015 06/04/18  1457 07/17/17  0941   A1C 5.5 5.5 6.0      Recent Labs   Lab Test 09/10/18  1015   WBC 4.8   HGB 14.6   HCT 43.4   MCV 97        Recent Labs   Lab Test 09/10/18  1015 04/09/18  1003 01/22/18  0843   HGB 14.6 14.2 15.1    No results for input(s): TROPONINI in the last 07675 hours.  No results for input(s): BNP, NTBNPI, NTBNP in the last 24738 hours.  Recent Labs   Lab Test 07/06/21  1049   TSH 1.71     No results for input(s): INR in the last 24503 hours.       Orlin Wells MD  [facility]    cc:   Dar Orr MD  [address]  [facility] [place] [address]

## 2021-12-02 ENCOUNTER — HOSPITAL ENCOUNTER (OUTPATIENT)
Dept: ULTRASOUND IMAGING | Facility: HOSPITAL | Age: 71
Discharge: HOME | End: 2021-12-02
Attending: INTERNAL MEDICINE | Admitting: INTERNAL MEDICINE
Payer: COMMERCIAL

## 2021-12-02 DIAGNOSIS — Z82.49 FAMILY HISTORY OF ABDOMINAL AORTIC ANEURYSM (AAA): ICD-10-CM

## 2021-12-02 PROCEDURE — 76775 US EXAM ABDO BACK WALL LIM: CPT

## 2021-12-08 LAB
MDC_IDC_LEAD_IMPLANT_DT: NORMAL
MDC_IDC_LEAD_IMPLANT_DT: NORMAL
MDC_IDC_LEAD_LOCATION: NORMAL
MDC_IDC_LEAD_LOCATION: NORMAL
MDC_IDC_LEAD_MFG: NORMAL
MDC_IDC_LEAD_MFG: NORMAL
MDC_IDC_LEAD_MODEL: NORMAL
MDC_IDC_LEAD_MODEL: NORMAL
MDC_IDC_LEAD_POLARITY_TYPE: NORMAL
MDC_IDC_LEAD_POLARITY_TYPE: NORMAL
MDC_IDC_LEAD_SERIAL: NORMAL
MDC_IDC_LEAD_SERIAL: NORMAL
MDC_IDC_LEAD_SPECIAL_FUNCTION: NORMAL
MDC_IDC_LEAD_SPECIAL_FUNCTION: NORMAL
MDC_IDC_MSMT_BATTERY_DTM: NORMAL
MDC_IDC_MSMT_BATTERY_IMPEDANCE: 1979 OHM
MDC_IDC_MSMT_BATTERY_REMAINING_LONGEVITY: 38 MO
MDC_IDC_MSMT_BATTERY_STATUS: NORMAL
MDC_IDC_MSMT_BATTERY_VOLTAGE: 2.72 V
MDC_IDC_MSMT_LEADCHNL_RA_IMPEDANCE_VALUE: 814 OHM
MDC_IDC_MSMT_LEADCHNL_RA_PACING_THRESHOLD_AMPLITUDE: 0.75 V
MDC_IDC_MSMT_LEADCHNL_RA_PACING_THRESHOLD_AMPLITUDE: 0.75 V
MDC_IDC_MSMT_LEADCHNL_RA_PACING_THRESHOLD_PULSEWIDTH: 0.4 MS
MDC_IDC_MSMT_LEADCHNL_RA_PACING_THRESHOLD_PULSEWIDTH: 0.4 MS
MDC_IDC_MSMT_LEADCHNL_RA_SENSING_INTR_AMPL: 1.4 MV
MDC_IDC_MSMT_LEADCHNL_RV_IMPEDANCE_VALUE: 578 OHM
MDC_IDC_MSMT_LEADCHNL_RV_PACING_THRESHOLD_AMPLITUDE: 2.25 V
MDC_IDC_MSMT_LEADCHNL_RV_PACING_THRESHOLD_PULSEWIDTH: 1 MS
MDC_IDC_MSMT_LEADCHNL_RV_SENSING_INTR_AMPL: 11.2 MV
MDC_IDC_PG_IMPLANT_DTM: NORMAL
MDC_IDC_PG_MFG: NORMAL
MDC_IDC_PG_MODEL: NORMAL
MDC_IDC_PG_SERIAL: NORMAL
MDC_IDC_PG_TYPE: NORMAL
MDC_IDC_SESS_CLINIC_NAME: NORMAL
MDC_IDC_SESS_DTM: NORMAL
MDC_IDC_SESS_TYPE: NORMAL
MDC_IDC_SET_BRADY_AT_MODE_SWITCH_MODE: NORMAL
MDC_IDC_SET_BRADY_AT_MODE_SWITCH_RATE: 175 {BEATS}/MIN
MDC_IDC_SET_BRADY_LOWRATE: 60 {BEATS}/MIN
MDC_IDC_SET_BRADY_MAX_SENSOR_RATE: 130 {BEATS}/MIN
MDC_IDC_SET_BRADY_MAX_TRACKING_RATE: 130 {BEATS}/MIN
MDC_IDC_SET_BRADY_MODE: NORMAL
MDC_IDC_SET_BRADY_PAV_DELAY_LOW: 150 MS
MDC_IDC_SET_BRADY_SAV_DELAY_LOW: 120 MS
MDC_IDC_SET_LEADCHNL_RA_PACING_AMPLITUDE: 1.5 V
MDC_IDC_SET_LEADCHNL_RA_PACING_CAPTURE_MODE: NORMAL
MDC_IDC_SET_LEADCHNL_RA_PACING_POLARITY: NORMAL
MDC_IDC_SET_LEADCHNL_RA_PACING_PULSEWIDTH: 0.4 MS
MDC_IDC_SET_LEADCHNL_RA_SENSING_POLARITY: NORMAL
MDC_IDC_SET_LEADCHNL_RA_SENSING_SENSITIVITY: 0.5 MV
MDC_IDC_SET_LEADCHNL_RV_PACING_AMPLITUDE: 3 V
MDC_IDC_SET_LEADCHNL_RV_PACING_CAPTURE_MODE: NORMAL
MDC_IDC_SET_LEADCHNL_RV_PACING_POLARITY: NORMAL
MDC_IDC_SET_LEADCHNL_RV_PACING_PULSEWIDTH: 1 MS
MDC_IDC_SET_LEADCHNL_RV_SENSING_POLARITY: NORMAL
MDC_IDC_SET_LEADCHNL_RV_SENSING_SENSITIVITY: 5.6 MV
MDC_IDC_SET_ZONE_DETECTION_INTERVAL: 333.33 MS
MDC_IDC_SET_ZONE_DETECTION_INTERVAL: 342.86 MS
MDC_IDC_SET_ZONE_TYPE: NORMAL
MDC_IDC_SET_ZONE_TYPE: NORMAL
MDC_IDC_STAT_AT_BURDEN_PERCENT: 0 %
MDC_IDC_STAT_AT_DTM_END: NORMAL
MDC_IDC_STAT_AT_DTM_START: NORMAL
MDC_IDC_STAT_AT_MODE_SW_COUNT: 3
MDC_IDC_STAT_BRADY_AP_VP_PERCENT: 0 %
MDC_IDC_STAT_BRADY_AP_VS_PERCENT: 73 %
MDC_IDC_STAT_BRADY_AS_VP_PERCENT: 0 %
MDC_IDC_STAT_BRADY_AS_VS_PERCENT: 27 %
MDC_IDC_STAT_BRADY_DTM_END: NORMAL
MDC_IDC_STAT_BRADY_DTM_START: NORMAL
MDC_IDC_STAT_EPISODE_RECENT_COUNT: 0
MDC_IDC_STAT_EPISODE_RECENT_COUNT: 3
MDC_IDC_STAT_EPISODE_RECENT_COUNT_DTM_END: NORMAL
MDC_IDC_STAT_EPISODE_RECENT_COUNT_DTM_END: NORMAL
MDC_IDC_STAT_EPISODE_RECENT_COUNT_DTM_START: NORMAL
MDC_IDC_STAT_EPISODE_RECENT_COUNT_DTM_START: NORMAL
MDC_IDC_STAT_EPISODE_TYPE: NORMAL
MDC_IDC_STAT_EPISODE_TYPE: NORMAL

## 2021-12-16 ENCOUNTER — ANCILLARY PROCEDURE (OUTPATIENT)
Dept: MAMMOGRAPHY | Facility: CLINIC | Age: 71
End: 2021-12-16
Attending: FAMILY MEDICINE
Payer: COMMERCIAL

## 2021-12-16 DIAGNOSIS — Z12.31 SCREENING MAMMOGRAM, ENCOUNTER FOR: ICD-10-CM

## 2021-12-16 PROCEDURE — 77067 SCR MAMMO BI INCL CAD: CPT

## 2022-01-19 DIAGNOSIS — L30.9 DERMATITIS: ICD-10-CM

## 2022-01-19 RX ORDER — TRIAMCINOLONE ACETONIDE 5 MG/G
OINTMENT TOPICAL
Qty: 30 G | Refills: 1 | Status: SHIPPED | OUTPATIENT
Start: 2022-01-19 | End: 2023-03-27

## 2022-01-19 NOTE — TELEPHONE ENCOUNTER
Routing refill request to provider for review/approval because:  PCP to determine refill    Scot Thomas RN

## 2022-02-01 ENCOUNTER — ANCILLARY PROCEDURE (OUTPATIENT)
Dept: CARDIOLOGY | Facility: CLINIC | Age: 72
End: 2022-02-01
Attending: INTERNAL MEDICINE
Payer: COMMERCIAL

## 2022-02-01 DIAGNOSIS — I49.5 SICK SINUS SYNDROME (H): ICD-10-CM

## 2022-02-01 DIAGNOSIS — Z95.0 PACEMAKER: ICD-10-CM

## 2022-02-01 LAB
MDC_IDC_LEAD_IMPLANT_DT: NORMAL
MDC_IDC_LEAD_IMPLANT_DT: NORMAL
MDC_IDC_LEAD_LOCATION: NORMAL
MDC_IDC_LEAD_LOCATION: NORMAL
MDC_IDC_LEAD_MFG: NORMAL
MDC_IDC_LEAD_MFG: NORMAL
MDC_IDC_LEAD_MODEL: NORMAL
MDC_IDC_LEAD_MODEL: NORMAL
MDC_IDC_LEAD_POLARITY_TYPE: NORMAL
MDC_IDC_LEAD_POLARITY_TYPE: NORMAL
MDC_IDC_LEAD_SERIAL: NORMAL
MDC_IDC_LEAD_SERIAL: NORMAL
MDC_IDC_LEAD_SPECIAL_FUNCTION: NORMAL
MDC_IDC_LEAD_SPECIAL_FUNCTION: NORMAL
MDC_IDC_MSMT_BATTERY_DTM: NORMAL
MDC_IDC_MSMT_BATTERY_IMPEDANCE: 2073 OHM
MDC_IDC_MSMT_BATTERY_REMAINING_LONGEVITY: 35 MO
MDC_IDC_MSMT_BATTERY_STATUS: NORMAL
MDC_IDC_MSMT_BATTERY_VOLTAGE: 2.76 V
MDC_IDC_MSMT_LEADCHNL_RA_IMPEDANCE_VALUE: 741 OHM
MDC_IDC_MSMT_LEADCHNL_RA_PACING_THRESHOLD_AMPLITUDE: 0.75 V
MDC_IDC_MSMT_LEADCHNL_RA_PACING_THRESHOLD_PULSEWIDTH: 0.4 MS
MDC_IDC_MSMT_LEADCHNL_RV_IMPEDANCE_VALUE: 586 OHM
MDC_IDC_MSMT_LEADCHNL_RV_SENSING_INTR_AMPL: 8 MV
MDC_IDC_PG_IMPLANT_DTM: NORMAL
MDC_IDC_PG_MFG: NORMAL
MDC_IDC_PG_MODEL: NORMAL
MDC_IDC_PG_SERIAL: NORMAL
MDC_IDC_PG_TYPE: NORMAL
MDC_IDC_SESS_CLINIC_NAME: NORMAL
MDC_IDC_SESS_DTM: NORMAL
MDC_IDC_SESS_TYPE: NORMAL
MDC_IDC_SET_BRADY_AT_MODE_SWITCH_MODE: NORMAL
MDC_IDC_SET_BRADY_AT_MODE_SWITCH_RATE: 175 {BEATS}/MIN
MDC_IDC_SET_BRADY_LOWRATE: 60 {BEATS}/MIN
MDC_IDC_SET_BRADY_MAX_SENSOR_RATE: 130 {BEATS}/MIN
MDC_IDC_SET_BRADY_MAX_TRACKING_RATE: 130 {BEATS}/MIN
MDC_IDC_SET_BRADY_MODE: NORMAL
MDC_IDC_SET_BRADY_PAV_DELAY_LOW: 150 MS
MDC_IDC_SET_BRADY_SAV_DELAY_LOW: 120 MS
MDC_IDC_SET_LEADCHNL_RA_PACING_AMPLITUDE: 1.5 V
MDC_IDC_SET_LEADCHNL_RA_PACING_CAPTURE_MODE: NORMAL
MDC_IDC_SET_LEADCHNL_RA_PACING_POLARITY: NORMAL
MDC_IDC_SET_LEADCHNL_RA_PACING_PULSEWIDTH: 0.4 MS
MDC_IDC_SET_LEADCHNL_RA_SENSING_POLARITY: NORMAL
MDC_IDC_SET_LEADCHNL_RA_SENSING_SENSITIVITY: 0.35 MV
MDC_IDC_SET_LEADCHNL_RV_PACING_AMPLITUDE: 3 V
MDC_IDC_SET_LEADCHNL_RV_PACING_CAPTURE_MODE: NORMAL
MDC_IDC_SET_LEADCHNL_RV_PACING_POLARITY: NORMAL
MDC_IDC_SET_LEADCHNL_RV_PACING_PULSEWIDTH: 1 MS
MDC_IDC_SET_LEADCHNL_RV_SENSING_POLARITY: NORMAL
MDC_IDC_SET_LEADCHNL_RV_SENSING_SENSITIVITY: 4 MV
MDC_IDC_SET_ZONE_DETECTION_INTERVAL: 333.33 MS
MDC_IDC_SET_ZONE_DETECTION_INTERVAL: 342.86 MS
MDC_IDC_SET_ZONE_TYPE: NORMAL
MDC_IDC_SET_ZONE_TYPE: NORMAL
MDC_IDC_STAT_AT_BURDEN_PERCENT: 0 %
MDC_IDC_STAT_AT_DTM_END: NORMAL
MDC_IDC_STAT_AT_DTM_START: NORMAL
MDC_IDC_STAT_AT_MODE_SW_COUNT: 1
MDC_IDC_STAT_BRADY_AP_VP_PERCENT: 0 %
MDC_IDC_STAT_BRADY_AP_VS_PERCENT: 83 %
MDC_IDC_STAT_BRADY_AS_VP_PERCENT: 0 %
MDC_IDC_STAT_BRADY_AS_VS_PERCENT: 17 %
MDC_IDC_STAT_BRADY_DTM_END: NORMAL
MDC_IDC_STAT_BRADY_DTM_START: NORMAL
MDC_IDC_STAT_EPISODE_RECENT_COUNT: 0
MDC_IDC_STAT_EPISODE_RECENT_COUNT: 0
MDC_IDC_STAT_EPISODE_RECENT_COUNT_DTM_END: NORMAL
MDC_IDC_STAT_EPISODE_RECENT_COUNT_DTM_END: NORMAL
MDC_IDC_STAT_EPISODE_RECENT_COUNT_DTM_START: NORMAL
MDC_IDC_STAT_EPISODE_RECENT_COUNT_DTM_START: NORMAL
MDC_IDC_STAT_EPISODE_TYPE: NORMAL
MDC_IDC_STAT_EPISODE_TYPE: NORMAL

## 2022-06-19 DIAGNOSIS — I10 ESSENTIAL HYPERTENSION: ICD-10-CM

## 2022-06-19 DIAGNOSIS — K21.00 GASTROESOPHAGEAL REFLUX DISEASE WITH ESOPHAGITIS WITHOUT HEMORRHAGE: ICD-10-CM

## 2022-06-19 DIAGNOSIS — E03.9 HYPOTHYROIDISM, UNSPECIFIED TYPE: ICD-10-CM

## 2022-06-20 RX ORDER — LEVOTHYROXINE SODIUM 25 UG/1
TABLET ORAL
Qty: 129 TABLET | Refills: 0 | Status: SHIPPED | OUTPATIENT
Start: 2022-06-20 | End: 2022-06-30

## 2022-06-20 RX ORDER — LOSARTAN POTASSIUM 25 MG/1
TABLET ORAL
Qty: 90 TABLET | Refills: 0 | Status: SHIPPED | OUTPATIENT
Start: 2022-06-20 | End: 2022-06-30

## 2022-06-30 ENCOUNTER — MYC REFILL (OUTPATIENT)
Dept: FAMILY MEDICINE | Facility: CLINIC | Age: 72
End: 2022-06-30

## 2022-06-30 DIAGNOSIS — I10 ESSENTIAL HYPERTENSION: ICD-10-CM

## 2022-06-30 DIAGNOSIS — E03.9 HYPOTHYROIDISM, UNSPECIFIED TYPE: ICD-10-CM

## 2022-06-30 RX ORDER — LOSARTAN POTASSIUM 25 MG/1
25 TABLET ORAL DAILY
Qty: 90 TABLET | Refills: 1 | Status: SHIPPED | OUTPATIENT
Start: 2022-06-30 | End: 2022-09-23

## 2022-06-30 RX ORDER — LEVOTHYROXINE SODIUM 25 UG/1
TABLET ORAL
Qty: 129 TABLET | Refills: 1 | Status: SHIPPED | OUTPATIENT
Start: 2022-06-30 | End: 2022-10-18

## 2022-06-30 NOTE — TELEPHONE ENCOUNTER
Routing refill request to provider for review/approval because:  PCP to address sig.  Estelle Mckeon RN

## 2022-08-01 ENCOUNTER — TRANSFERRED RECORDS (OUTPATIENT)
Dept: HEALTH INFORMATION MANAGEMENT | Facility: CLINIC | Age: 72
End: 2022-08-01

## 2022-08-01 LAB — HEMOCCULT STL QL IA: NORMAL

## 2022-08-04 ENCOUNTER — TELEPHONE (OUTPATIENT)
Dept: FAMILY MEDICINE | Facility: CLINIC | Age: 72
End: 2022-08-04

## 2022-08-04 DIAGNOSIS — L30.9 DERMATITIS: ICD-10-CM

## 2022-08-04 NOTE — TELEPHONE ENCOUNTER
Call placed to patient   Relayed Dr. Acevedo message    Patient would like the Nail Lacquer sent to [facility] with refills if possible     Patient states she is not technically savvy  Has a hard time with mychart and cannot do the steps of a video visit      Telephone appointment scheduled with Dorcas for 8/5/2022 at 0940 to discuss the oral medication  Also, scheduled patient for a lab only visit on 8/5/2022 at 1100 to have liver function tests checked     Will forward to Dr. Acevedo and Dorcas to review    Scot Thomas RN

## 2022-08-04 NOTE — TELEPHONE ENCOUNTER
Reason for Call:  Medication or medication refill:    Do you use a [facility]?  Name of the pharmacy and phone number for the current request:  [facility]     Name of the medication requested: Pt asking for Rxs for 2 meds for nail fungus.  She states that they were last prescribed in 2017 and 2019. Please call patient and advise.      Other request: .      Can we leave a detailed message on this number? YES    Phone number patient can be reached at: Cell number on file:    Telephone Information:   Mobile [phone]     Best Time: any    Call taken on 8/4/2022 at 1:11 PM by Roz Ryan

## 2022-08-04 NOTE — TELEPHONE ENCOUNTER
Call placed to patient   Relayed Dr. cAevedo message    Patient verbalized understanding  Will discuss with Dorcas  Patient was evaluated by her Dermatologist today and told patient she had a nail fungus - patient thought she had leftover medications and got home and noted that the medications were    Thus reaching out to PCP     Patient again has a very hard time with technology - has a lab only appointment at 1100 after telephone visit - if Dorcas would like to assess, maybe could assess when patient comes for lab?     Will forward to Dorcas to review    Scot Thomas RN

## 2022-08-04 NOTE — TELEPHONE ENCOUNTER
Hi - is she hoping to get the nail laquer or a pill med?  I can send the laquer if she would like but for the pill antifungal she will need at least an Evisit or video visit because she will need to do liver function testing and send a picture before starting    Thanks    EB

## 2022-08-04 NOTE — TELEPHONE ENCOUNTER
If she is speaking with Crystal tomorrow I think it would be most appropriate for them to come up with a plan together (often you would not need the laquer if you do the oral)!  If she can figure out how to get an image on a [employer] message that would be helpful for crystal as I am not sure how comfortable she would feel trating without any way to visualize the toenail as the meds can have side effects)  Thanks!    EB

## 2022-08-04 NOTE — TELEPHONE ENCOUNTER
I'll discuss it with her tomorrow. I don't see any Laquer or oral pills in her history.     Thanks,  Gwen Cuellar DNP, NP-C

## 2022-08-08 ENCOUNTER — MYC MEDICAL ADVICE (OUTPATIENT)
Dept: FAMILY MEDICINE | Facility: CLINIC | Age: 72
End: 2022-08-08

## 2022-09-14 ENCOUNTER — TRANSFERRED RECORDS (OUTPATIENT)
Dept: HEALTH INFORMATION MANAGEMENT | Facility: CLINIC | Age: 72
End: 2022-09-14

## 2022-09-22 DIAGNOSIS — I10 ESSENTIAL HYPERTENSION: ICD-10-CM

## 2022-09-23 RX ORDER — LOSARTAN POTASSIUM 25 MG/1
TABLET ORAL
Qty: 100 TABLET | Refills: 0 | Status: SHIPPED | OUTPATIENT
Start: 2022-09-23 | End: 2022-10-18

## 2022-09-23 NOTE — TELEPHONE ENCOUNTER
Routing refill request to provider for review/approval because:  Labs not current:  BMP 7/6/21  Estelle Mckeon RN

## 2022-10-11 ENCOUNTER — LAB (OUTPATIENT)
Dept: LAB | Facility: CLINIC | Age: 72
End: 2022-10-11
Payer: COMMERCIAL

## 2022-10-11 DIAGNOSIS — L65.9 HAIR LOSS: ICD-10-CM

## 2022-10-11 DIAGNOSIS — R73.01 IMPAIRED FASTING GLUCOSE: ICD-10-CM

## 2022-10-11 DIAGNOSIS — E78.5 HYPERLIPIDEMIA LDL GOAL <130: ICD-10-CM

## 2022-10-11 DIAGNOSIS — I10 ESSENTIAL HYPERTENSION: ICD-10-CM

## 2022-10-11 DIAGNOSIS — E03.9 HYPOTHYROIDISM, UNSPECIFIED TYPE: ICD-10-CM

## 2022-10-11 LAB
ANION GAP SERPL CALCULATED.3IONS-SCNC: 12 MMOL/L (ref 7–15)
BUN SERPL-MCNC: 22.1 MG/DL (ref 8–23)
CALCIUM SERPL-MCNC: 9.3 MG/DL (ref 8.8–10.2)
CHLORIDE SERPL-SCNC: 103 MMOL/L (ref 98–107)
CHOLEST SERPL-MCNC: 182 MG/DL
CREAT SERPL-MCNC: 0.84 MG/DL (ref 0.51–0.95)
DEPRECATED HCO3 PLAS-SCNC: 27 MMOL/L (ref 22–29)
GFR SERPL CREATININE-BSD FRML MDRD: 73 ML/MIN/1.73M2
GLUCOSE SERPL-MCNC: 107 MG/DL (ref 70–99)
HBA1C MFR BLD: 5.4 % (ref 0–5.6)
HDLC SERPL-MCNC: 91 MG/DL
LDLC SERPL CALC-MCNC: 73 MG/DL
NONHDLC SERPL-MCNC: 91 MG/DL
POTASSIUM SERPL-SCNC: 4.1 MMOL/L (ref 3.4–5.3)
SODIUM SERPL-SCNC: 142 MMOL/L (ref 136–145)
TRIGL SERPL-MCNC: 90 MG/DL
TSH SERPL DL<=0.005 MIU/L-ACNC: 1.76 UIU/ML (ref 0.3–4.2)

## 2022-10-11 PROCEDURE — 84443 ASSAY THYROID STIM HORMONE: CPT

## 2022-10-11 PROCEDURE — 80048 BASIC METABOLIC PNL TOTAL CA: CPT

## 2022-10-11 PROCEDURE — 83036 HEMOGLOBIN GLYCOSYLATED A1C: CPT

## 2022-10-11 PROCEDURE — 36415 COLL VENOUS BLD VENIPUNCTURE: CPT

## 2022-10-11 PROCEDURE — 80061 LIPID PANEL: CPT

## 2022-10-15 ENCOUNTER — HEALTH MAINTENANCE LETTER (OUTPATIENT)
Age: 72
End: 2022-10-15

## 2022-10-17 ENCOUNTER — ANCILLARY PROCEDURE (OUTPATIENT)
Dept: CARDIOLOGY | Facility: CLINIC | Age: 72
End: 2022-10-17
Attending: INTERNAL MEDICINE
Payer: COMMERCIAL

## 2022-10-17 DIAGNOSIS — I49.5 SICK SINUS SYNDROME (H): ICD-10-CM

## 2022-10-17 DIAGNOSIS — Z95.0 PACEMAKER: ICD-10-CM

## 2022-10-17 PROBLEM — M54.9 BACKACHE: Status: RESOLVED | Noted: 2021-07-23 | Resolved: 2022-10-17

## 2022-10-17 LAB
MDC_IDC_LEAD_IMPLANT_DT: NORMAL
MDC_IDC_LEAD_IMPLANT_DT: NORMAL
MDC_IDC_LEAD_LOCATION: NORMAL
MDC_IDC_LEAD_LOCATION: NORMAL
MDC_IDC_LEAD_MFG: NORMAL
MDC_IDC_LEAD_MFG: NORMAL
MDC_IDC_LEAD_MODEL: NORMAL
MDC_IDC_LEAD_MODEL: NORMAL
MDC_IDC_LEAD_POLARITY_TYPE: NORMAL
MDC_IDC_LEAD_POLARITY_TYPE: NORMAL
MDC_IDC_LEAD_SERIAL: NORMAL
MDC_IDC_LEAD_SERIAL: NORMAL
MDC_IDC_LEAD_SPECIAL_FUNCTION: NORMAL
MDC_IDC_LEAD_SPECIAL_FUNCTION: NORMAL
MDC_IDC_MSMT_BATTERY_DTM: NORMAL
MDC_IDC_MSMT_BATTERY_IMPEDANCE: 2425 OHM
MDC_IDC_MSMT_BATTERY_REMAINING_LONGEVITY: 29 MO
MDC_IDC_MSMT_BATTERY_STATUS: NORMAL
MDC_IDC_MSMT_BATTERY_VOLTAGE: 2.74 V
MDC_IDC_MSMT_LEADCHNL_RA_IMPEDANCE_VALUE: 805 OHM
MDC_IDC_MSMT_LEADCHNL_RA_PACING_THRESHOLD_AMPLITUDE: 0.75 V
MDC_IDC_MSMT_LEADCHNL_RA_PACING_THRESHOLD_PULSEWIDTH: 0.4 MS
MDC_IDC_MSMT_LEADCHNL_RV_IMPEDANCE_VALUE: 569 OHM
MDC_IDC_PG_IMPLANT_DTM: NORMAL
MDC_IDC_PG_MFG: NORMAL
MDC_IDC_PG_MODEL: NORMAL
MDC_IDC_PG_SERIAL: NORMAL
MDC_IDC_PG_TYPE: NORMAL
MDC_IDC_SESS_CLINIC_NAME: NORMAL
MDC_IDC_SESS_DTM: NORMAL
MDC_IDC_SESS_TYPE: NORMAL
MDC_IDC_SET_BRADY_AT_MODE_SWITCH_MODE: NORMAL
MDC_IDC_SET_BRADY_AT_MODE_SWITCH_RATE: 175 {BEATS}/MIN
MDC_IDC_SET_BRADY_LOWRATE: 60 {BEATS}/MIN
MDC_IDC_SET_BRADY_MAX_SENSOR_RATE: 130 {BEATS}/MIN
MDC_IDC_SET_BRADY_MAX_TRACKING_RATE: 130 {BEATS}/MIN
MDC_IDC_SET_BRADY_MODE: NORMAL
MDC_IDC_SET_BRADY_PAV_DELAY_LOW: 150 MS
MDC_IDC_SET_BRADY_SAV_DELAY_LOW: 120 MS
MDC_IDC_SET_LEADCHNL_RA_PACING_AMPLITUDE: 1.62
MDC_IDC_SET_LEADCHNL_RA_PACING_CAPTURE_MODE: NORMAL
MDC_IDC_SET_LEADCHNL_RA_PACING_POLARITY: NORMAL
MDC_IDC_SET_LEADCHNL_RA_PACING_PULSEWIDTH: 0.4 MS
MDC_IDC_SET_LEADCHNL_RA_SENSING_POLARITY: NORMAL
MDC_IDC_SET_LEADCHNL_RA_SENSING_SENSITIVITY: 0.5 MV
MDC_IDC_SET_LEADCHNL_RV_PACING_AMPLITUDE: 3 V
MDC_IDC_SET_LEADCHNL_RV_PACING_CAPTURE_MODE: NORMAL
MDC_IDC_SET_LEADCHNL_RV_PACING_POLARITY: NORMAL
MDC_IDC_SET_LEADCHNL_RV_PACING_PULSEWIDTH: 1 MS
MDC_IDC_SET_LEADCHNL_RV_SENSING_POLARITY: NORMAL
MDC_IDC_SET_LEADCHNL_RV_SENSING_SENSITIVITY: 4 MV
MDC_IDC_SET_ZONE_DETECTION_INTERVAL: 333.33 MS
MDC_IDC_SET_ZONE_DETECTION_INTERVAL: 342.86 MS
MDC_IDC_SET_ZONE_TYPE: NORMAL
MDC_IDC_SET_ZONE_TYPE: NORMAL
MDC_IDC_STAT_AT_BURDEN_PERCENT: 0 %
MDC_IDC_STAT_AT_DTM_END: NORMAL
MDC_IDC_STAT_AT_DTM_START: NORMAL
MDC_IDC_STAT_AT_MODE_SW_COUNT: 2
MDC_IDC_STAT_BRADY_AP_VP_PERCENT: 0 %
MDC_IDC_STAT_BRADY_AP_VS_PERCENT: 80 %
MDC_IDC_STAT_BRADY_AS_VP_PERCENT: 0 %
MDC_IDC_STAT_BRADY_AS_VS_PERCENT: 20 %
MDC_IDC_STAT_BRADY_DTM_END: NORMAL
MDC_IDC_STAT_BRADY_DTM_START: NORMAL
MDC_IDC_STAT_EPISODE_RECENT_COUNT: 0
MDC_IDC_STAT_EPISODE_RECENT_COUNT: 1
MDC_IDC_STAT_EPISODE_RECENT_COUNT_DTM_END: NORMAL
MDC_IDC_STAT_EPISODE_RECENT_COUNT_DTM_END: NORMAL
MDC_IDC_STAT_EPISODE_RECENT_COUNT_DTM_START: NORMAL
MDC_IDC_STAT_EPISODE_RECENT_COUNT_DTM_START: NORMAL
MDC_IDC_STAT_EPISODE_TYPE: NORMAL
MDC_IDC_STAT_EPISODE_TYPE: NORMAL

## 2022-10-17 PROCEDURE — 93294 REM INTERROG EVL PM/LDLS PM: CPT | Performed by: INTERNAL MEDICINE

## 2022-10-17 PROCEDURE — 93296 REM INTERROG EVL PM/IDS: CPT | Performed by: INTERNAL MEDICINE

## 2022-10-18 ENCOUNTER — OFFICE VISIT (OUTPATIENT)
Dept: FAMILY MEDICINE | Facility: CLINIC | Age: 72
End: 2022-10-18
Payer: COMMERCIAL

## 2022-10-18 VITALS
HEART RATE: 73 BPM | SYSTOLIC BLOOD PRESSURE: 130 MMHG | HEIGHT: 64 IN | BODY MASS INDEX: 22.07 KG/M2 | OXYGEN SATURATION: 99 % | DIASTOLIC BLOOD PRESSURE: 75 MMHG | TEMPERATURE: 97 F | WEIGHT: 129.3 LBS

## 2022-10-18 DIAGNOSIS — Z00.00 ENCOUNTER FOR MEDICARE ANNUAL WELLNESS EXAM: Primary | ICD-10-CM

## 2022-10-18 DIAGNOSIS — R19.7 DIARRHEA, UNSPECIFIED TYPE: ICD-10-CM

## 2022-10-18 DIAGNOSIS — I10 ESSENTIAL HYPERTENSION: ICD-10-CM

## 2022-10-18 DIAGNOSIS — Z12.11 SCREEN FOR COLON CANCER: ICD-10-CM

## 2022-10-18 DIAGNOSIS — E03.9 HYPOTHYROIDISM, UNSPECIFIED TYPE: ICD-10-CM

## 2022-10-18 PROCEDURE — 86364 TISS TRNSGLTMNASE EA IG CLAS: CPT | Performed by: FAMILY MEDICINE

## 2022-10-18 PROCEDURE — G0008 ADMIN INFLUENZA VIRUS VAC: HCPCS | Mod: 59 | Performed by: FAMILY MEDICINE

## 2022-10-18 PROCEDURE — 90662 IIV NO PRSV INCREASED AG IM: CPT | Performed by: FAMILY MEDICINE

## 2022-10-18 PROCEDURE — 36415 COLL VENOUS BLD VENIPUNCTURE: CPT | Performed by: FAMILY MEDICINE

## 2022-10-18 PROCEDURE — 91312 COVID-19,PF,PFIZER BOOSTER BIVALENT: CPT | Performed by: FAMILY MEDICINE

## 2022-10-18 PROCEDURE — 0124A COVID-19,PF,PFIZER BOOSTER BIVALENT: CPT | Performed by: FAMILY MEDICINE

## 2022-10-18 PROCEDURE — G0439 PPPS, SUBSEQ VISIT: HCPCS | Mod: 25 | Performed by: FAMILY MEDICINE

## 2022-10-18 RX ORDER — LEVOTHYROXINE SODIUM 25 UG/1
TABLET ORAL
Qty: 129 TABLET | Refills: 3 | Status: SHIPPED | OUTPATIENT
Start: 2022-10-18 | End: 2023-03-26

## 2022-10-18 RX ORDER — LOSARTAN POTASSIUM 25 MG/1
25 TABLET ORAL DAILY
Qty: 90 TABLET | Refills: 3 | Status: SHIPPED | OUTPATIENT
Start: 2022-10-18 | End: 2022-12-14

## 2022-10-18 ASSESSMENT — ENCOUNTER SYMPTOMS
EYE PAIN: 0
PARESTHESIAS: 0
MYALGIAS: 0
HEMATURIA: 0
ARTHRALGIAS: 0
SHORTNESS OF BREATH: 0
DIARRHEA: 0
DYSURIA: 0
HEARTBURN: 0
FREQUENCY: 0
CONSTIPATION: 0
BREAST MASS: 0
HEMATOCHEZIA: 0
NERVOUS/ANXIOUS: 0
SORE THROAT: 0
FEVER: 0
ABDOMINAL PAIN: 0
NAUSEA: 0
DIZZINESS: 0
JOINT SWELLING: 0
PALPITATIONS: 0
WEAKNESS: 0
HEADACHES: 0
COUGH: 0
CHILLS: 0

## 2022-10-18 ASSESSMENT — ACTIVITIES OF DAILY LIVING (ADL): CURRENT_FUNCTION: NO ASSISTANCE NEEDED

## 2022-10-18 NOTE — PATIENT INSTRUCTIONS
Patient Education   Personalized Prevention Plan  You are due for the preventive services outlined below.  Your care team is available to assist you in scheduling these services.  If you have already completed any of these items, please share that information with your care team to update in your medical record.  Health Maintenance Due   Topic Date Due     ANNUAL REVIEW OF HM ORDERS  Never done     Hepatitis B Vaccine (1 of 3 - 3-dose series) Never done     Colorectal Cancer Screening  05/09/2022     COVID-19 Vaccine (5 - Booster for Pfizer series) 06/08/2022     Flu Vaccine (1) 09/01/2022     Annual Wellness Visit  11/08/2022       Exercise for a Healthier Heart  You may wonder how you can improve the health of your heart. If you re thinking about exercise, you re on the right track. You don t need to become an athlete. But you do need a certain amount of brisk exercise to help strengthen your heart. If you have been diagnosed with a heart condition, your healthcare provider may advise exercise to help stabilize your condition. To help make exercise a habit, choose safe, fun activities.      Exercise with a friend. When activity is fun, you're more likely to stick with it.   Before you start  Check with your healthcare provider before starting an exercise program. This is especially important if you have not been active for a while. It's also important if you have a long-term (chronic) health problem such as heart disease, diabetes, or obesity. Or if you are at high risk for having these problems.   Why exercise?  Exercising regularly offers many healthy rewards. It can help you do all of the following:     Improve your blood cholesterol level to help prevent further heart trouble    Lower your blood pressure to help prevent a stroke or heart attack    Control diabetes, or reduce your risk of getting this disease    Improve your heart and lung function    Reach and stay at a healthy weight    Make your muscles  stronger so you can stay active    Prevent falls and fractures by slowing the loss of bone mass (osteoporosis)    Manage stress better    Reduce your blood pressure    Improve your sense of self and your body image  Exercise tips      Ease into your routine. Set small goals. Then build on them. If you are not sure what your activity level should be, talk with your healthcare provider first before starting an exercise routine.    Exercise on most days. Aim for a total of 150 minutes (2 hours and 30 minutes) or more of moderate-intensity aerobic activity each week. Or 75 minutes (1 hour and 15 minutes) or more of vigorous-intensity aerobic activity each week. Or try for a combination of both. Moderate activity means that you breathe heavier and your heart rate increases but you can still talk. Think about doing 40 minutes of moderate exercise, 3 to 4 times a week. For best results, activity should last for about 40 minutes to lower blood pressure and cholesterol. It's OK to work up to the 40-minute period over time. Examples of moderate-intensity activity are walking 1 mile in 15 minutes. Or doing 30 to 45 minutes of yard work.    Step up your daily activity level.  Along with your exercise program, try being more active the whole day. Walk instead of drive. Or park further away so that you take more steps each day. Do more household tasks or yard work. You may not be able to meet the advised mount of physical activity. But doing some moderate- or vigorous-intensity aerobic activity can help reduce your risk for heart disease. Your healthcare provider can help you figure out what is best for you.    Choose 1 or more activities you enjoy.  Walking is one of the easiest things you can do. You can also try swimming, riding a bike, dancing, or taking an exercise class.    When to call your healthcare provider  Call your healthcare provider if you have any of these:     Chest pain or feel dizzy or lightheaded    Burning,  tightness, pressure, or heaviness in your chest, neck, shoulders, back, or arms    Abnormal shortness of breath    More joint or muscle pain    A very fast or irregular heartbeat (palpitations)  [employer] last reviewed this educational content on 7/1/2019 2000-2021 [employer]. All rights reserved. This information is not intended as a substitute for professional medical care. Always follow your healthcare professional's instructions.          Understanding USDA MyPlate  The USDA has guidelines to help you make healthy food choices. These are called MyPlate. MyPlate shows the food groups that make up healthy meals using the image of a place setting. Before you eat, think about the healthiest choices for what to put on your plate or in your cup or bowl. To learn more about building a healthy plate, visit www.choosemyplate.gov.    The food groups    Fruits. Any fruit or 100% fruit juice counts as part of the Fruit Group. Fruits may be fresh, canned, frozen, or dried, and may be whole, cut-up, or pureed. Make 1/2 of your plate fruits and vegetables.    Vegetables. Any vegetable or 100% vegetable juice counts as a member of the Vegetable Group. Vegetables may be fresh, frozen, canned, or dried. They can be served raw or cooked and may be whole, cut-up, or mashed. Make 1/2 of your plate fruits and vegetables.    Grains. All foods made from grains are part of the Grains Group. These include wheat, rice, oats, cornmeal, and barley. Grains are often used to make foods such as bread, pasta, oatmeal, cereal, tortillas, and grits. Grains should be no more than 1/4 of your plate. At least half of your grains should be whole grains.    Protein. This group includes meat, poultry, seafood, beans and peas, eggs, processed soy products (such as tofu), nuts (including nut butters), and seeds. Make protein choices no more than 1/4 of your plate. Meat and poultry choices should be lean or low fat.    Dairy. The Dairy  Group includes all fluid milk products and foods made from milk that contain calcium, such as yogurt and cheese. (Foods that have little calcium, such as cream, butter, and cream cheese, are not part of this group.) Most dairy choices should be low-fat or fat-free.    Oils. Oils aren't a food group, but they do contain essential nutrients. However it's important to watch your intake of oils. These are fats that are liquid at room temperature. They include canola, corn, olive, soybean, vegetable, and sunflower oil. Foods that are mainly oil include mayonnaise, certain salad dressings, and soft margarines. You likely already get your daily oil allowance from the foods you eat.  Things to limit  Eating healthy also means limiting these things in your diet:       Salt (sodium). Many processed foods have a lot of sodium. To keep sodium intake down, eat fresh vegetables, meats, poultry, and seafood when possible. Purchase low-sodium, reduced-sodium, or no-salt-added food products at the store. And don't add salt to your meals at home. Instead, season them with herbs and spices such as dill, oregano, cumin, and paprika. Or try adding flavor with lemon or lime zest and juice.    Saturated fat. Saturated fats are most often found in animal products such as beef, pork, and chicken. They are often solid at room temperature, such as butter. To reduce your saturated fat intake, choose leaner cuts of meat and poultry. And try healthier cooking methods such as grilling, broiling, roasting, or baking. For a simple lower-fat swap, use plain nonfat yogurt instead of mayonnaise when making potato salad or macaroni salad.    Added sugars. These are sugars added to foods. They are in foods such as ice cream, candy, soda, fruit drinks, sports drinks, energy drinks, cookies, pastries, jams, and syrups. Cut down on added sugars by sharing sweet treats with a family member or friend. You can also choose fruit for dessert, and drink water or  other unsweetened beverages.     [employer] last reviewed this educational content on 6/1/2020 2000-2021 [employer]. All rights reserved. This information is not intended as a substitute for professional medical care. Always follow your healthcare professional's instructions.          Urinary Incontinence, Female (Adult)   Urinary incontinence means loss of bladder control. This problem affects many women, especially as they get older. If you have incontinence, you may be embarrassed to ask for help. But know that this problem can be treated.   Types of Incontinence  There are different types of incontinence. Two of the main types are described here. You can have more than one type.     Stress incontinence. With this type, urine leaks when pressure (stress) is put on the bladder. This may happen when you cough, sneeze, or laugh. Stress incontinence most often occurs because the pelvic floor muscles that support the bladder and urethra are weak. This can happen after pregnancy and vaginal childbirth or a hysterectomy. It can also be due to excess body weight or hormone changes.    Urge incontinence (also called overactive bladder). With this type, a sudden urge to urinate is felt often. This may happen even though there may not be much urine in the bladder. The need to urinate often during the night is common. Urge incontinence most often occurs because of bladder spasms. This may be due to bladder irritation or infection. Damage to bladder nerves or pelvic muscles, constipation, and certain medicines can also lead to urge incontinence.  Treatment depends on the cause. Further evaluation is needed to find the type you have. This will likely include an exam and certain tests. Based on the results, you and your healthcare provider can then plan treatment. Until a diagnosis is made, the home care tips below can help ease symptoms.   Home care    Do pelvic floor muscle exercises, if they are  prescribed. The pelvic floor muscles help support the bladder and urethra. Many women find that their symptoms improve when doing special exercises that strengthen these muscles. To do the exercises, contract the muscles you would use to stop your stream of urine. But do this when you re not urinating. Hold for 10 seconds, then relax. Repeat 10 to 20 times in a row, at least 3 times a day. Your healthcare provider may give you other instructions for how to do the exercises and how often.    Keep a bladder diary. This helps track how often and how much you urinate over a set period of time. Bring this diary with you to your next visit with the provider. The information can help your provider learn more about your bladder problem.    Lose weight, if advised to by your provider. Extra weight puts pressure on the bladder. Your provider can help you create a weight-loss plan that s right for you. This may include exercising more and making certain diet changes.    Don't have foods and drinks that may irritate the bladder. These can include alcohol and caffeinated drinks.    Quit smoking. Smoking and other tobacco use can lead to a long-term (chronic) cough that strains the pelvic floor muscles. Smoking may also damage the bladder and urethra. Talk with your provider about treatments or methods you can use to quit smoking.    If drinking large amounts of fluid makes you have symptoms, you may be advised to limit your fluid intake. You may also be advised to drink most of your fluids during the day and to limit fluids at night.    If you re worried about urine leakage or accidents, you may wear absorbent pads to catch urine. Change the pads often. This helps reduce discomfort. It may also reduce the risk of skin or bladder infections.    Follow-up care  Follow up with your healthcare provider, or as directed. It may take some to find the right treatment for your problem. But healthy lifestyle changes can be made right  away. These include such things as exercising on a regular basis, eating a healthy diet, losing weight (if needed), and quitting smoking. Your treatment plan may include special therapies or medicines. Certain procedures or surgery may also be options. Talk about any questions you have with your provider.   When to seek medical advice  Call the healthcare provider right away if any of these occur:    Fever of 100.4 F (38 C) or higher, or as directed by your provider    Bladder pain or fullness    Belly swelling    Nausea or vomiting    Back pain    Weakness, dizziness, or fainting  [employer] last reviewed this educational content on 1/1/2020 2000-2021 [employer]. All rights reserved. This information is not intended as a substitute for professional medical care. Always follow your healthcare professional's instructions.          Preventing Falls at Home  A person can fall for many reasons. Older adults may fall because reaction time slows down as we age. Your muscles and joints may get stiff, weak, or less flexible because of illness, medicines, or a physical condition.   Other health problems that make falls more likely include:     Arthritis    Dizziness or lightheadedness when you stand up (orthostatic hypotension)    History of a stroke    Dizziness    Anemia    Certain medicines taken for mental illness or to control blood pressure.    Problems with balance or gait    Bladder or urinary problems    History of falling    Changes in vision (vision impairment)    Changes in thinking skills and memory (cognitive impairment)  Injuries from a fall can include serious injuries such as broken bones, dislocated joints, internal bleeding and cuts. Injuries like these can limit your independence.   Prevention tips  To help prevent falls and fall-related injuries, follow the tips below.    Floors  To make floors safer:     Put nonskid pads under area rugs.    Remove small rugs.    Replace worn floor  coverings.    Tack carpets firmly to each step on carpeted stairs. Put nonskid strips on the edges of uncarpeted stairs.    Keep floors and stairs free of clutter and cords.    Arrange furniture so there are clear pathways.    Clean up any spills right away.  Bathrooms    To make bathrooms safer:     Install grab bars in the tub or shower.    Apply nonskid strips or put a nonskid rubber mat in the tub or shower.    Sit on a bath chair to bathe.    Use bathmats with nonskid backing.  Lighting  To improve visibility in your home:      Keep a flashlight in each room. Or put a lamp next to the bed within easy reach.    Put nightlights in the bedrooms, hallways, kitchen, and bathrooms.    Make sure all stairways have good lighting.    Take your time when going up and down stairs.    Put handrails on both sides of stairs and in walkways for more support. To prevent injury to your wrist or arm, don t use handrails to pull yourself up.    Install grab bars to pull yourself up.    Move or rearrange items that you use often. This will make them easier to find or reach.    Look at your home to find any safety hazards. Especially look at doorways, walkways, and the driveway. Remove or repair any safety problems that you find.  Other changes to make    Look around to find any safety hazards. Look closely at doorways, walkways, and the driveway. Remove or repair any safety problems that you find.    Wear shoes that fit well.    Take your time when going up and down stairs.    Put handrails on both sides of stairs and in walkways for more support. To prevent injury to your wrist or arm, don t use handrails to pull yourself up.    Install grab bars wherever needed to pull yourself up.    Arrange items that you use often. This will make them easier to find or reach.    [employer]. [employer] last reviewed this educational content on 3/1/2020    3158-5048 [employer]. All rights reserved. This information is not intended as a  substitute for professional medical care. Always follow your healthcare professional's instructions.

## 2022-10-18 NOTE — PROGRESS NOTES
"SUBJECTIVE:   Max is a 72 year old who presents for Preventive Visit.      Patient has been advised of split billing requirements and indicates understanding: Yes  Are you in the first 12 months of your Medicare coverage?  No    Healthy Habits:     In general, how would you rate your overall health?  Excellent    Frequency of exercise:  None    Do you usually eat at least 4 servings of fruit and vegetables a day, include whole grains    & fiber and avoid regularly eating high fat or \"junk\" foods?  No    Taking medications regularly:  Yes    Medication side effects:  None    Ability to successfully perform activities of daily living:  No assistance needed    Home Safety:  No safety concerns identified    Hearing Impairment:  No hearing concerns    In the past 6 months, have you been bothered by leaking of urine? Yes    In general, how would you rate your overall mental or emotional health?  Excellent      PHQ-2 Total Score: 0    Additional concerns today:  Yes    Do you feel safe in your environment? Yes    Have you ever done Advance Care Planning? (For example, a Health Directive, POLST, or a discussion with a medical provider or your loved ones about your wishes): Yes, advance care planning is on file.       Fall risk  Fallen 2 or more times in the past year?: No  Any fall with injury in the past year?: Yes    Cognitive Screening   1) Repeat 3 items (Leader, Season, Table)    2) Clock draw: NORMAL  3) 3 item recall: Recalls 3 objects  Results: 3 items recalled: COGNITIVE IMPAIRMENT LESS LIKELY    Mini-CogTM Copyright EUGENIO Bowden. Licensed by the author for use in [facility]; reprinted with permission (iman@.[facility]). All rights reserved.      Do you have sleep apnea, excessive snoring or daytime drowsiness?: no    Reviewed and updated as needed this visit by clinical staff   Tobacco  Allergies  Meds   Med Hx  Surg Hx  Fam Hx  Soc Hx        Reviewed and updated as needed this visit by Provider         "         Social History     Tobacco Use     Smoking status: Never     Smokeless tobacco: Never   Substance Use Topics     Alcohol use: Not on file     If you drink alcohol do you typically have >3 drinks per day or >7 drinks per week? No    Alcohol Use 10/18/2022   Prescreen: >3 drinks/day or >7 drinks/week? No   Prescreen: >3 drinks/day or >7 drinks/week? -       Diarrhea: Patient states that she has been experiencing diarrhea for the last many years since gastric bypass surgery.  She states over the last year this is gotten worse.  She notes that she takes omeprazole for reflux and this is generally helped her stools however this has not been as effective recently.  She notes that she occasionally will have accidents where she did not make it to the bathroom in time.    She notes that after she eats she will get the diarrhea.  She wonders about irritable bowel.  She does not have any blood in her stool.  She recently did a fit test which was negative.    Current providers sharing in care for this patient include:Patient Care Team:  Roz Acevedo MD as PCP - General  Roz Acevedo MD as Assigned PCP  Orlin Wells MD as Assigned Heart and Vascular Provider  Rodney Vee MD as MD (Endocrinology, Diabetes, and Metabolism)    The following health maintenance items are reviewed in Epic and correct as of today:  Health Maintenance   Topic Date Due     HEPATITIS B IMMUNIZATION (1 of 3 - 3-dose series) Never done     COLORECTAL CANCER SCREENING  05/09/2022     MEDICARE ANNUAL WELLNESS VISIT  11/08/2022     ANNUAL REVIEW OF HM ORDERS  10/18/2023     FALL RISK ASSESSMENT  10/18/2023     MAMMO SCREENING  12/16/2023     DTAP/TDAP/TD IMMUNIZATION (2 - Td or Tdap) 03/18/2026     LIPID  10/11/2027     ADVANCE CARE PLANNING  10/18/2027     DEXA  11/01/2036     HEPATITIS C SCREENING  Completed     PHQ-2 (once per calendar year)  Completed     INFLUENZA VACCINE  Completed     Pneumococcal Vaccine: 65+  "Years  Completed     ZOSTER IMMUNIZATION  Completed     COVID-19 Vaccine  Completed     IPV IMMUNIZATION  Aged Out     MENINGITIS IMMUNIZATION  Aged Out     Lab work is in process  Mammogram Screening: Mammogram Screening: Recommended mammography every 1-2 years with patient discussion and risk factor consideration        Review of Systems   Constitutional: Negative for chills and fever.   HENT: Negative for congestion, ear pain, hearing loss and sore throat.    Eyes: Negative for pain and visual disturbance.   Respiratory: Negative for cough and shortness of breath.    Cardiovascular: Negative for chest pain, palpitations and peripheral edema.   Gastrointestinal: Negative for abdominal pain, constipation, diarrhea, heartburn, hematochezia and nausea.   Breasts:  Negative for tenderness, breast mass and discharge.   Genitourinary: Negative for dysuria, frequency, genital sores, hematuria, pelvic pain, urgency, vaginal bleeding and vaginal discharge.   Musculoskeletal: Negative for arthralgias, joint swelling and myalgias.   Skin: Negative for rash.   Neurological: Negative for dizziness, weakness, headaches and paresthesias.   Psychiatric/Behavioral: Negative for mood changes. The patient is not nervous/anxious.          OBJECTIVE:   /75   Pulse 73   Temp 97  F (36.1  C) (Tympanic)   Ht 1.62 m (5' 3.78\")   Wt 58.7 kg (129 lb 4.8 oz)   SpO2 99%   BMI 22.35 kg/m   Estimated body mass index is 22.35 kg/m  as calculated from the following:    Height as of this encounter: 1.62 m (5' 3.78\").    Weight as of this encounter: 58.7 kg (129 lb 4.8 oz).  Physical Exam  Objective:  Vital signs reviewed and stable  General: No acute distress  Psych: Appropriate affect  HEENT: moist mucous membranes, pupils equal, round, reactive to light and accomodation, tympanic membranes are pearly grey bilaterally  Lymph: no cervical or supraclavicular lymphadenopathy  Cardiovascular: regular rate and rhythm with no " "murmur  Pulmonary: clear to auscultation bilaterally with no wheeze  Abdomen: soft, non tender, non distended with normo-active bowel sounds  Extremities: warm and well perfused with no edema  Skin: warm and dry with no rash        ASSESSMENT / PLAN:     1. Encounter for Medicare annual wellness exam    2. Diarrhea, unspecified type  Etiology is unclear.  Discussed that IBS is a diagnosis of exclusion.  Discussed that we could do a colonoscopy to further evaluate.  Patient is fairly adverse to doing this.  She would like to get gluten intolerance testing and she does note that this happens after eating.  We discussed using Imodium occasionally to see if this is helpful.  Otherwise we could consider using Bentyl as well.  - Tissue transglutaminase ghulam IgA and IgG; Future  - Tissue transglutaminase ghulam IgA and IgG    3. Hypothyroidism, unspecified type  refill  - levothyroxine (SYNTHROID/LEVOTHROID) 25 MCG tablet; TAKE 2 TABLETS BY MOUTH ON  MONDAY AND FRIDAY AND 1  TABLET BY MOUTH ON ALL  OTHER DAYS  Dispense: 129 tablet; Refill: 3    4. Essential hypertension  At goal - refill  - losartan (COZAAR) 25 MG tablet; Take 1 tablet (25 mg) by mouth daily  Dispense: 90 tablet; Refill: 3    5. Screen for colon cancer  Recent Fit Test    Patient has been advised of split billing requirements and indicates understanding: Yes    COUNSELING:  Reviewed preventive health counseling, as reflected in patient instructions    Estimated body mass index is 22.35 kg/m  as calculated from the following:    Height as of this encounter: 1.62 m (5' 3.78\").    Weight as of this encounter: 58.7 kg (129 lb 4.8 oz).        She reports that she has never smoked. She has never used smokeless tobacco.      Appropriate preventive services were discussed with this patient, including applicable screening as appropriate for cardiovascular disease, diabetes, osteopenia/osteoporosis, and glaucoma.  As appropriate for age/gender, discussed screening for " colorectal cancer, prostate cancer, breast cancer, and cervical cancer. Checklist reviewing preventive services available has been given to the patient.    Reviewed patients plan of care and provided an AVS. The Basic Care Plan (routine screening as documented in Health Maintenance) for Desi meets the Care Plan requirement. This Care Plan has been established and reviewed with the Patient.    Counseling Resources:  ATP IV Guidelines  Pooled Cohorts Equation Calculator  Breast Cancer Risk Calculator  Breast Cancer: Medication to Reduce Risk  FRAX Risk Assessment  ICSI Preventive Guidelines  Dietary Guidelines for Americans, 2010  [employer]'s MyPlate  ASA Prophylaxis  Lung CA Screening    Roz Acevedo MD  [facility]    Identified Health Risks:    She is at risk for lack of exercise and has been provided with information to increase physical activity for the benefit of her well-being.  The patient was counseled and encouraged to consider modifying their diet and eating habits. She was provided with information on recommended healthy diet options.  Information on urinary incontinence and treatment options given to patient.  She is at risk for falling and has been provided with information to reduce the risk of falling at home.

## 2022-10-19 LAB
TTG IGA SER-ACNC: 0.6 U/ML
TTG IGG SER-ACNC: 0.7 U/ML

## 2022-10-31 ENCOUNTER — MYC MEDICAL ADVICE (OUTPATIENT)
Dept: FAMILY MEDICINE | Facility: CLINIC | Age: 72
End: 2022-10-31

## 2022-10-31 DIAGNOSIS — R19.7 DIARRHEA, UNSPECIFIED TYPE: Primary | ICD-10-CM

## 2022-11-01 NOTE — TELEPHONE ENCOUNTER
Please fax GI referral to:     [phone].  Referral to see Dr. Rl Pandya - [facility]    Thanks!    EB

## 2022-11-10 ENCOUNTER — E-VISIT (OUTPATIENT)
Dept: OBGYN | Facility: CLINIC | Age: 72
End: 2022-11-10
Payer: COMMERCIAL

## 2022-11-10 DIAGNOSIS — J32.9 SINUSITIS, UNSPECIFIED CHRONICITY, UNSPECIFIED LOCATION: Primary | ICD-10-CM

## 2022-11-10 PROCEDURE — 99422 OL DIG E/M SVC 11-20 MIN: CPT | Performed by: FAMILY MEDICINE

## 2022-12-14 ENCOUNTER — MYC MEDICAL ADVICE (OUTPATIENT)
Dept: FAMILY MEDICINE | Facility: CLINIC | Age: 72
End: 2022-12-14

## 2022-12-14 DIAGNOSIS — I10 ESSENTIAL HYPERTENSION: ICD-10-CM

## 2022-12-14 RX ORDER — LOSARTAN POTASSIUM 25 MG/1
25 TABLET ORAL DAILY
Qty: 30 TABLET | Refills: 0 | Status: SHIPPED | OUTPATIENT
Start: 2022-12-14 | End: 2023-06-20

## 2023-02-03 ASSESSMENT — ENCOUNTER SYMPTOMS
BLOATING: 1
ABDOMINAL PAIN: 0
VOMITING: 0
CONSTIPATION: 0
RECTAL PAIN: 0
DIARRHEA: 1
NAUSEA: 0
HEARTBURN: 0
BOWEL INCONTINENCE: 1
BLOOD IN STOOL: 0
JAUNDICE: 0

## 2023-02-06 ENCOUNTER — OFFICE VISIT (OUTPATIENT)
Dept: CARDIOLOGY | Facility: CLINIC | Age: 73
End: 2023-02-06
Payer: COMMERCIAL

## 2023-02-06 ENCOUNTER — ANCILLARY PROCEDURE (OUTPATIENT)
Dept: CARDIOLOGY | Facility: CLINIC | Age: 73
End: 2023-02-06
Payer: COMMERCIAL

## 2023-02-06 VITALS
BODY MASS INDEX: 22.81 KG/M2 | SYSTOLIC BLOOD PRESSURE: 128 MMHG | DIASTOLIC BLOOD PRESSURE: 96 MMHG | WEIGHT: 132 LBS | RESPIRATION RATE: 14 BRPM | HEART RATE: 72 BPM

## 2023-02-06 DIAGNOSIS — I42.8 NON-ISCHEMIC CARDIOMYOPATHY (H): Primary | ICD-10-CM

## 2023-02-06 DIAGNOSIS — Z95.0 CARDIAC PACEMAKER IN SITU: Primary | ICD-10-CM

## 2023-02-06 DIAGNOSIS — Z95.0 CARDIAC PACEMAKER IN SITU: ICD-10-CM

## 2023-02-06 DIAGNOSIS — I49.5 SICK SINUS SYNDROME (H): ICD-10-CM

## 2023-02-06 PROCEDURE — 99214 OFFICE O/P EST MOD 30 MIN: CPT | Performed by: INTERNAL MEDICINE

## 2023-02-06 RX ORDER — LOPERAMIDE HCL 2 MG
2 CAPSULE ORAL 4 TIMES DAILY PRN
COMMUNITY
End: 2023-07-11

## 2023-02-06 NOTE — LETTER
2/6/2023    [place]oz Acevedo [place]  [id number] [address] [facility] [address]    RE: Desi Vega       Dear Colleague,     [facility] the [facility].    HEART CARE ENCOUNTER CONSULTATON NOTE      [facility]  [phone]      Assessment/Recommendations   Assessment/Plan:  1.  Sick sinus syndrome.  Pacemaker interrogation demonstrated infrequent supraventricular tachycardia.  Less than 0.1% burden.  She has not been aware of any fast or irregular heart rhythms and we will continue to monitor.    2.  History of mild LV dysfunction.  Echocardiogram most recently in 2020 reveals an ejection fraction of 50%.  She has not had any specific signs or symptoms of heart failure.  Findings demonstrated LV function to be 50%, grade 1 left ventricular diastolic dysfunction, mild to moderate tricuspid regurgitation.    3.  Family history of abdominal aortic aneurysm.  Negative abdominal ultrasound in 2021.  4.  Hypertension.  Blood pressure was mildly elevated upon arrival but recheck was at goal.  Prior blood pressure in October was also at goal.  I did suggested periodic monitoring of her blood pressure with the benefit.  5.  Risk modification.  Lipids from October 2022 were reviewed.  Total cholesterol 182, triglycerides of 90 LDL 73.    1.  Echocardiogram  2.  Blood pressure monitoring  3.  Follow-up in 1 year or sooner if specific issues were to arise.         History of Present Illness/Subjective    HPI: Desi Vega is a 72 year old female who is seen in follow-up.  She has a history of sick sinus syndrome and is status post pacemaker implantation.  She has a history of mild LV dysfunction with the most recent echocardiogram previously completed October 2020 at which time ejection fraction was 50%.  She had a CT coronary angiogram in 2017 that demonstrated no obstructive coronary artery disease..  She has a family history of  abdominal aortic aneurysm with ultrasound completed December 2021 negative for abdominal aortic aneurysm.  He has a history of prior pulmonary embolism after knee replacement surgery in 2015.  Most recent echocardiogram from October 2020 reported normal estimate of RV systolic pressure.    She reports feeling well.  She denies any chest pain or shortness of breath.  She denies any dizziness or lightheadedness.  She has not been monitoring her blood pressure regularly.  Blood pressure on arrival was elevated but blood pressure when she saw her primary care physician in October was 130/75.    Recent Echocardiogram Results:  Echocardiogram Complete  Order: [id number]   Status: Final result      Visible to patient: Yes (not seen)      Next appt: 02/06/2023 at 09:00 AM in Cardiology (The Device Nurse)      Dx: Secondary cardiomyopathy (H)      1 Result Note  Details    Reading Physician Reading Date Result Priority   Bhavik Esposito MD  [phone] 10/13/2020 Routine   Provider, Historical 10/13/2020      Narrative & Impression    Left ventricular ejection fraction is mildly decreased. The estimated left ventricular ejection fraction is 50%.    Normal left ventricular cavity size and wall thickness.    Grade I left ventricular diastolic dysfunction is present with normal estimated left atrial pressure.    Normal right ventricular size and systolic function.    Mild to moderate tricuspid valve regurgitation.    The estimated systolic pulmonary artery pressure is normal at 24 mm Hg.    When compared to the previous study dated 9/10/2019, visually the LVEF appears similar.         Recent Coronary Angiogram Results:         4/11/17 10:11 AM 4/11/17 11:21 AM [id number] [facility] [place]        [id number]       PACS Images     Show images for CTA Angiogram coronary artery     Study Result    Narrative & Impression     Normal coronary anatomy with no significant coronary artery disease detected in this  study.            10/17/22  8:05 AM 10/17/22 12:31 PM [id number] [facility] [place]      Narrative & Impression    Encounter Type: routine remote pacemaker transmission.  Device: Medtronic Adapta PPM.   Pacing % /Programmed: AP 80%,  0% at AAIR<=>DDDR 60/130.  Lead(s): Stable.  Battery longevity: 2yrs, 5mo.  Presenting: Sinus 100 bpm.  Atrial arrhythmia: since 7/6/22; one mode switch episode lasting 12 minutes, EGM shows 1:1 atrial tachy arrhythmia 137 bpm.  Ventricular arrhythmia: since 7/6/22; None detected.  Device/Lead alerts: None.  Comments: Normal magnet and pacemaker function.  Plan: Patient due for annual device check in December 2022. ALEXA Duke, Device Specialist    EXAM: US ABDOMINAL AORTA  LOCATION: [facility]  DATE/TIME: 12/2/2021 10:09 AM     INDICATION: Family history of aortic aneurysm. Aneurysm screening.  COMPARISON: 11/26/2014.  TECHNIQUE: Transverse and longitudinal images of the aorta. Color flow and spectral Doppler with waveform analysis.     FINDINGS: No abdominal aortic aneurysm.  There is mild atheromatous plaque in the abdominal aorta.     MEASUREMENTS:  Proximal Aorta: 2.6 x 2.6 cm.  Mid Aorta: 1.9 x 2.0 cm.  Distal Aorta: 1.8 x 1.8 cm.  Right Common Iliac Artery: 1.4 cm.  Left Common Iliac Artery: 1.2 cm.                                                                      IMPRESSION:  1.  No aortoiliac aneurysm.             Physical Examination  Review of Systems   Vitals: 128/96 upon arrival, 122/82 during my examination, weight 132 pounds, heart rate of 72 and regular  Wt Readings from Last 3 Encounters:   10/18/22 58.7 kg (129 lb 4.8 oz)   11/30/21 57.6 kg (127 lb)   11/08/21 58.1 kg (128 lb)       General Appearance:   no distress, normal body habitus   ENT/Mouth:  Wearing a facemask   EYES:  no scleral icterus, normal conjunctivae   Neck: no carotid bruits or thyromegaly   Chest/Lungs:   lungs are clear to auscultation, no rales or  wheezing, well-healed pacemaker sternal scar, equal chest wall expansion    Cardiovascular:   Regular. Normal first and second heart sounds with no murmurs, rubs, or gallops; the carotid, radial and posterior tibial pulses are intact, Jugular venous pressure within normal limits, no significant edema bilaterally    Abdomen:  no , bruits, or tenderness; bowel sounds are present   Extremities: no cyanosis or clubbing   Skin: no xanthelasma, warm.    Neurologic:  no tremors     Psychiatric: alert and oriented x3, calm        Please refer above for cardiac ROS details.        Medical History  Surgical History Family History Social History   Past Medical History:   Diagnosis Date     Anhedonia      Arthritis      Diarrhea     thought to be due to gastric bypass     Grief reaction      History of palpitations      Hyperlipidemia      Insomnia      Osteopenia      Osteopenia      Shortness of breath      Sick sinus syndrome (H)     bradycardia - pacemaker placed     TGA (transient global amnesia) 2004    Transient global amnesia - on plavix     Tinnitus      Urinary frequency     with some leakage     Past Surgical History:   Procedure Laterality Date     ANKLE SURGERY       CHOLECYSTECTOMY       GASTRIC BYPASS       HC CORRECT BUNION,DOUBLE OSTEOTOMY      Description: Hallux Valgus (Bunion) Correction;  Proc Date: 01/01/2004;     HC REMOVAL GALLBLADDER      Description: Cholecystectomy;  Proc Date: 01/01/1987;     HEMORRHOIDECTOMY INTERNAL LIGATION      Description: Hemorrhoidectomy;  Proc Date: 01/01/2012;     HYSTERECTOMY  1988     IMPLANT PACEMAKER  1999     JOINT REPLACEMENT Left      MAMMOPLASTY REDUCTION Bilateral 1997     DE LAP,URETHRAL SUSPENSION      Description: Laparoscopic Urethral Suspension For Stress Incontinence;  Proc Date: 01/01/2002;     TOTAL KNEE ARTHROPLASTY Right 9/10/2015    Done by Dr. Gutierrez, [facility]     [facility] REPAIR OF RECTOCELE      Description: Rectocele Repair;  Proc Date:  01/01/2002;     [facility] TOTAL ABDOM HYSTERECTOMY      Description: Hysterectomy;  Proc Date: 01/01/1988;  Comments: for menorrhagia, still has ovaries     [facility] OPEN TREATMENT PROXIMAL FIBULA/SHAFT FRACTURE      Description: Open Treatment Of Fracture Of Fibular Shaft;  Recorded: 06/11/2013;     Family History   Problem Relation Age of Onset     Coronary Artery Disease Father      Diabetes Maternal Grandmother      Diabetes Brother         Social History     Socioeconomic History     Marital status:      Spouse name: Not on file     Number of children: Not on file     Years of education: Not on file     Highest education level: Not on file   Occupational History     Not on file   Tobacco Use     Smoking status: Never     Smokeless tobacco: Never   Substance and Sexual Activity     Alcohol use: Not on file     Drug use: Not on file     Sexual activity: Not on file   Other Topics Concern     Not on file   Social History Narrative     Not on file     Social Determinants of Health     Financial Resource Strain: Low Risk      Difficulty of Paying Living Expenses: Not hard at all   Food Insecurity: No Food Insecurity     Worried About Running Out of Food in the Last Year: Never true     Ran Out of Food in the Last Year: Never true   Transportation Needs: No Transportation Needs     Lack of Transportation (Medical): No     Lack of Transportation (Non-Medical): No   Physical Activity: Not on file   Stress: Not on file   Social Connections: Not on file   Intimate Partner Violence: Not on file   Housing Stability: Not on file           Medications  Allergies   Current Outpatient Medications   Medication Sig Dispense Refill     amoxicillin-clavulanate (AUGMENTIN) 875-125 MG tablet Take 1 tablet by mouth 2 times daily 14 tablet 0     biotin 2.5 MG CAPS Take 1 capsule by mouth daily        CALCIUM PO Take 1,200 mg by mouth daily       levothyroxine (SYNTHROID/LEVOTHROID) 25 MCG tablet TAKE 2 TABLETS BY MOUTH ON  MONDAY AND  FRIDAY AND 1  TABLET BY MOUTH ON ALL  OTHER DAYS 129 tablet 3     losartan (COZAAR) 25 MG tablet Take 1 tablet (25 mg) by mouth daily 30 tablet 0     Lutein 20 MG CAPS Take 20 mg by mouth daily        Multiple Vitamins-Minerals (ONCOVITE) TABS Take 1 tablet by mouth daily        omeprazole (PRILOSEC) 20 MG DR capsule TAKE 1 CAPSULE BY MOUTH  DAILY 100 capsule 2     triamcinolone (KENALOG) 0.5 % external ointment USE TO LEG TWICE DAILY AS  NEEDED 30 g 1     vitamin D3 (CHOLECALCIFEROL) 125 MCG (5000 UT) tablet Take 5,000 Units by mouth daily          Allergies   Allergen Reactions     Aspirin Swelling     Bee Venom Swelling     Throat swelling, hands swelling     Fire Ant           Lab Results    Chemistry/lipid CBC Cardiac Enzymes/BNP/TSH/INR   Recent Labs   Lab Test 10/11/22  1143   CHOL 182   HDL 91   LDL 73   TRIG 90     Recent Labs   Lab Test 10/11/22  1143 11/03/21  1006 07/06/21  1049   LDL 73 67 113*     Recent Labs   Lab Test 10/11/22  1143      POTASSIUM 4.1   CHLORIDE 103   CO2 27   *   BUN 22.1   CR 0.84   GFRESTIMATED 73   MATT 9.3     Recent Labs   Lab Test 10/11/22  1143 07/06/21  1049 08/05/20  1240   CR 0.84 0.81 0.73     Recent Labs   Lab Test 10/11/22  1143 09/10/18  1015 06/04/18  1457   A1C 5.4 5.5 5.5          Recent Labs   Lab Test 09/10/18  1015   WBC 4.8   HGB 14.6   HCT 43.4   MCV 97        Recent Labs   Lab Test 09/10/18  1015 04/09/18  1003 01/22/18  0843   HGB 14.6 14.2 15.1    No results for input(s): TROPONINI in the last 31727 hours.  No results for input(s): BNP, NTBNPI, NTBNP in the last 70231 hours.  Recent Labs   Lab Test 10/11/22  1143   TSH 1.76     No results for input(s): INR in the last 64596 hours.     Orlin Wells MD                Thank you for allowing me to participate in the care of your patient.      Sincerely,     Orlin Wells MD     [facility]  cc:   Dar Orr MD  [address] [place] [address]  [address]  [facility] [place] [address]

## 2023-02-06 NOTE — PATIENT INSTRUCTIONS
We are going to plan a heart ultrasound and I will be in touch with results.If able please check your blood pressure periodically with the goal blood pressure being 135/85 or less average.My nurse is Evette and her number is [phone]

## 2023-02-06 NOTE — PROGRESS NOTES
HEART CARE ENCOUNTER CONSULTATON NOTE      [facility]  [phone]      Assessment/Recommendations   Assessment/Plan:  1.  Sick sinus syndrome.  Pacemaker interrogation demonstrated infrequent supraventricular tachycardia.  Less than 0.1% burden.  She has not been aware of any fast or irregular heart rhythms and we will continue to monitor.    2.  History of mild LV dysfunction.  Echocardiogram most recently in 2020 reveals an ejection fraction of 50%.  She has not had any specific signs or symptoms of heart failure.  Findings demonstrated LV function to be 50%, grade 1 left ventricular diastolic dysfunction, mild to moderate tricuspid regurgitation.    3.  Family history of abdominal aortic aneurysm.  Negative abdominal ultrasound in 2021.  4.  Hypertension.  Blood pressure was mildly elevated upon arrival but recheck was at goal.  Prior blood pressure in October was also at goal.  I did suggested periodic monitoring of her blood pressure with the benefit.  5.  Risk modification.  Lipids from October 2022 were reviewed.  Total cholesterol 182, triglycerides of 90 LDL 73.    1.  Echocardiogram  2.  Blood pressure monitoring  3.  Follow-up in 1 year or sooner if specific issues were to arise.         History of Present Illness/Subjective    HPI: Desi Vega is a 72 year old female who is seen in follow-up.  She has a history of sick sinus syndrome and is status post pacemaker implantation.  She has a history of mild LV dysfunction with the most recent echocardiogram previously completed October 2020 at which time ejection fraction was 50%.  She had a CT coronary angiogram in 2017 that demonstrated no obstructive coronary artery disease..  She has a family history of abdominal aortic aneurysm with ultrasound completed December 2021 negative for abdominal aortic aneurysm.  He has a history of prior pulmonary embolism after knee replacement surgery in 2015.  Most recent echocardiogram  from October 2020 reported normal estimate of RV systolic pressure.    She reports feeling well.  She denies any chest pain or shortness of breath.  She denies any dizziness or lightheadedness.  She has not been monitoring her blood pressure regularly.  Blood pressure on arrival was elevated but blood pressure when she saw her primary care physician in October was 130/75.    Recent Echocardiogram Results:  Echocardiogram Complete  Order: [id number]  Status: Final result     Visible to patient: Yes (not seen)     Next appt: 02/06/2023 at 09:00 AM in Cardiology (The Device Nurse)     Dx: Secondary cardiomyopathy (H)     1 Result Note  Details    Reading Physician Reading Date Result Priority   Bhavik Esposito MD  [phone] 10/13/2020 Routine   Provider, Historical 10/13/2020      Narrative & Impression    Left ventricular ejection fraction is mildly decreased. The estimated left ventricular ejection fraction is 50%.    Normal left ventricular cavity size and wall thickness.    Grade I left ventricular diastolic dysfunction is present with normal estimated left atrial pressure.    Normal right ventricular size and systolic function.    Mild to moderate tricuspid valve regurgitation.    The estimated systolic pulmonary artery pressure is normal at 24 mm Hg.    When compared to the previous study dated 9/10/2019, visually the LVEF appears similar.         Recent Coronary Angiogram Results:         4/11/17 10:11 AM 4/11/17 11:21 AM [id number] [facility] [place]        [id number]       PACS Images     Show images for CTA Angiogram coronary artery     Study Result    Narrative & Impression     Normal coronary anatomy with no significant coronary artery disease detected in this study.            10/17/22  8:05 AM 10/17/22 12:31 PM [id number] [facility] [place]      Narrative & Impression    Encounter Type: routine remote pacemaker transmission.  Device: Medtronic Adapta PPM.    Pacing % /Programmed: AP 80%,  0% at AAIR<=>DDDR 60/130.  Lead(s): Stable.  Battery longevity: 2yrs, 5mo.  Presenting: Sinus 100 bpm.  Atrial arrhythmia: since 7/6/22; one mode switch episode lasting 12 minutes, EGM shows 1:1 atrial tachy arrhythmia 137 bpm.  Ventricular arrhythmia: since 7/6/22; None detected.  Device/Lead alerts: None.  Comments: Normal magnet and pacemaker function.  Plan: Patient due for annual device check in December 2022. ALEXA Duke, Device Specialist    EXAM: US ABDOMINAL AORTA  LOCATION: [facility]  DATE/TIME: 12/2/2021 10:09 AM     INDICATION: Family history of aortic aneurysm. Aneurysm screening.  COMPARISON: 11/26/2014.  TECHNIQUE: Transverse and longitudinal images of the aorta. Color flow and spectral Doppler with waveform analysis.     FINDINGS: No abdominal aortic aneurysm.  There is mild atheromatous plaque in the abdominal aorta.     MEASUREMENTS:  Proximal Aorta: 2.6 x 2.6 cm.  Mid Aorta: 1.9 x 2.0 cm.  Distal Aorta: 1.8 x 1.8 cm.  Right Common Iliac Artery: 1.4 cm.  Left Common Iliac Artery: 1.2 cm.                                                                      IMPRESSION:  1.  No aortoiliac aneurysm.             Physical Examination  Review of Systems   Vitals: 128/96 upon arrival, 122/82 during my examination, weight 132 pounds, heart rate of 72 and regular  Wt Readings from Last 3 Encounters:   10/18/22 58.7 kg (129 lb 4.8 oz)   11/30/21 57.6 kg (127 lb)   11/08/21 58.1 kg (128 lb)       General Appearance:   no distress, normal body habitus   ENT/Mouth:  Wearing a facemask   EYES:  no scleral icterus, normal conjunctivae   Neck: no carotid bruits or thyromegaly   Chest/Lungs:   lungs are clear to auscultation, no rales or wheezing, well-healed pacemaker sternal scar, equal chest wall expansion    Cardiovascular:   Regular. Normal first and second heart sounds with no murmurs, rubs, or gallops; the carotid, radial and posterior tibial pulses  are intact, Jugular venous pressure within normal limits, no significant edema bilaterally    Abdomen:  no , bruits, or tenderness; bowel sounds are present   Extremities: no cyanosis or clubbing   Skin: no xanthelasma, warm.    Neurologic:  no tremors     Psychiatric: alert and oriented x3, calm        Please refer above for cardiac ROS details.        Medical History  Surgical History Family History Social History   Past Medical History:   Diagnosis Date     Anhedonia      Arthritis      Diarrhea     thought to be due to gastric bypass     Grief reaction      History of palpitations      Hyperlipidemia      Insomnia      Osteopenia      Osteopenia      Shortness of breath      Sick sinus syndrome (H)     bradycardia - pacemaker placed     TGA (transient global amnesia) 2004    Transient global amnesia - on plavix     Tinnitus      Urinary frequency     with some leakage     Past Surgical History:   Procedure Laterality Date     ANKLE SURGERY       CHOLECYSTECTOMY       GASTRIC BYPASS       HC CORRECT BUNION,DOUBLE OSTEOTOMY      Description: Hallux Valgus (Bunion) Correction;  Proc Date: 01/01/2004;     HC REMOVAL GALLBLADDER      Description: Cholecystectomy;  Proc Date: 01/01/1987;     HEMORRHOIDECTOMY INTERNAL LIGATION      Description: Hemorrhoidectomy;  Proc Date: 01/01/2012;     HYSTERECTOMY  1988     IMPLANT PACEMAKER  1999     JOINT REPLACEMENT Left      MAMMOPLASTY REDUCTION Bilateral 1997     CO LAP,URETHRAL SUSPENSION      Description: Laparoscopic Urethral Suspension For Stress Incontinence;  Proc Date: 01/01/2002;     TOTAL KNEE ARTHROPLASTY Right 9/10/2015    Done by Dr. Gutierrez, [facility]     [facility] REPAIR OF RECTOCELE      Description: Rectocele Repair;  Proc Date: 01/01/2002;     [facility] TOTAL ABDOM HYSTERECTOMY      Description: Hysterectomy;  Proc Date: 01/01/1988;  Comments: for menorrhagia, still has ovaries     [facility] OPEN TREATMENT PROXIMAL FIBULA/SHAFT FRACTURE      Description: Open  Treatment Of Fracture Of Fibular Shaft;  Recorded: 06/11/2013;     Family History   Problem Relation Age of Onset     Coronary Artery Disease Father      Diabetes Maternal Grandmother      Diabetes Brother         Social History     Socioeconomic History     Marital status:      Spouse name: Not on file     Number of children: Not on file     Years of education: Not on file     Highest education level: Not on file   Occupational History     Not on file   Tobacco Use     Smoking status: Never     Smokeless tobacco: Never   Substance and Sexual Activity     Alcohol use: Not on file     Drug use: Not on file     Sexual activity: Not on file   Other Topics Concern     Not on file   Social History Narrative     Not on file     Social Determinants of Health     Financial Resource Strain: Low Risk      Difficulty of Paying Living Expenses: Not hard at all   Food Insecurity: No Food Insecurity     Worried About Running Out of Food in the Last Year: Never true     Ran Out of Food in the Last Year: Never true   Transportation Needs: No Transportation Needs     Lack of Transportation (Medical): No     Lack of Transportation (Non-Medical): No   Physical Activity: Not on file   Stress: Not on file   Social Connections: Not on file   Intimate Partner Violence: Not on file   Housing Stability: Not on file           Medications  Allergies   Current Outpatient Medications   Medication Sig Dispense Refill     amoxicillin-clavulanate (AUGMENTIN) 875-125 MG tablet Take 1 tablet by mouth 2 times daily 14 tablet 0     biotin 2.5 MG CAPS Take 1 capsule by mouth daily        CALCIUM PO Take 1,200 mg by mouth daily       levothyroxine (SYNTHROID/LEVOTHROID) 25 MCG tablet TAKE 2 TABLETS BY MOUTH ON  MONDAY AND FRIDAY AND 1  TABLET BY MOUTH ON ALL  OTHER DAYS 129 tablet 3     losartan (COZAAR) 25 MG tablet Take 1 tablet (25 mg) by mouth daily 30 tablet 0     Lutein 20 MG CAPS Take 20 mg by mouth daily        Multiple Vitamins-Minerals  (ONCOVITE) TABS Take 1 tablet by mouth daily        omeprazole (PRILOSEC) 20 MG DR capsule TAKE 1 CAPSULE BY MOUTH  DAILY 100 capsule 2     triamcinolone (KENALOG) 0.5 % external ointment USE TO LEG TWICE DAILY AS  NEEDED 30 g 1     vitamin D3 (CHOLECALCIFEROL) 125 MCG (5000 UT) tablet Take 5,000 Units by mouth daily          Allergies   Allergen Reactions     Aspirin Swelling     Bee Venom Swelling     Throat swelling, hands swelling     Fire Ant           Lab Results    Chemistry/lipid CBC Cardiac Enzymes/BNP/TSH/INR   Recent Labs   Lab Test 10/11/22  1143   CHOL 182   HDL 91   LDL 73   TRIG 90     Recent Labs   Lab Test 10/11/22  1143 11/03/21  1006 07/06/21  1049   LDL 73 67 113*     Recent Labs   Lab Test 10/11/22  1143      POTASSIUM 4.1   CHLORIDE 103   CO2 27   *   BUN 22.1   CR 0.84   GFRESTIMATED 73   MATT 9.3     Recent Labs   Lab Test 10/11/22  1143 07/06/21  1049 08/05/20  1240   CR 0.84 0.81 0.73     Recent Labs   Lab Test 10/11/22  1143 09/10/18  1015 06/04/18  1457   A1C 5.4 5.5 5.5          Recent Labs   Lab Test 09/10/18  1015   WBC 4.8   HGB 14.6   HCT 43.4   MCV 97        Recent Labs   Lab Test 09/10/18  1015 04/09/18  1003 01/22/18  0843   HGB 14.6 14.2 15.1    No results for input(s): TROPONINI in the last 65854 hours.  No results for input(s): BNP, NTBNPI, NTBNP in the last 82494 hours.  Recent Labs   Lab Test 10/11/22  1143   TSH 1.76     No results for input(s): INR in the last 34194 hours.     Orlin Wells MD

## 2023-02-08 ENCOUNTER — TRANSFERRED RECORDS (OUTPATIENT)
Dept: HEALTH INFORMATION MANAGEMENT | Facility: CLINIC | Age: 73
End: 2023-02-08
Payer: COMMERCIAL

## 2023-02-10 ENCOUNTER — OFFICE VISIT (OUTPATIENT)
Dept: ENDOCRINOLOGY | Facility: CLINIC | Age: 73
End: 2023-02-10
Attending: FAMILY MEDICINE
Payer: COMMERCIAL

## 2023-02-10 VITALS
BODY MASS INDEX: 22.64 KG/M2 | SYSTOLIC BLOOD PRESSURE: 122 MMHG | HEART RATE: 64 BPM | WEIGHT: 131 LBS | DIASTOLIC BLOOD PRESSURE: 82 MMHG

## 2023-02-10 DIAGNOSIS — E56.9 VITAMIN DEFICIENCY: ICD-10-CM

## 2023-02-10 DIAGNOSIS — L68.0 HIRSUTISM: ICD-10-CM

## 2023-02-10 DIAGNOSIS — E61.8 MINERAL DEFICIENCY: ICD-10-CM

## 2023-02-10 DIAGNOSIS — L65.9 HAIR LOSS: ICD-10-CM

## 2023-02-10 DIAGNOSIS — Z98.84 H/O GASTRIC BYPASS: ICD-10-CM

## 2023-02-10 PROCEDURE — 99204 OFFICE O/P NEW MOD 45 MIN: CPT | Performed by: INTERNAL MEDICINE

## 2023-02-10 NOTE — LETTER
"    2/10/2023         RE: Desi Vega  [address] [facility] [place] [address]        Dear Colleague,    Thank you for referring your patient, Desi Vega, to the [facility]. Please see a copy of my visit note below.    Subjective:    New patient, seen remotely by Dr. Ramachandran for hair loss and he started LT4 as noted below    Desi Vega is a 72 year old female who presents for hair loss. No prior assessment of androgens or IGF-1.     She is on LT4 25 mcg daily and this was started by Dr. Ramachandran 6/2018 because \"her TSH was 2.6 in the setting of hair loss\". TSH and free T4 have always been normal most recently 10/2022 it was 1.76. She takes LT4 25 mcg tabs. 2 tabs on Monday and Friday and the other days 1 tab.     No prior hypercalcemia. Osteopenia on DEXA in 2021.     10/2022: HbA1c 5.4%    Hair loss for about 10 years and is described as diffuse and particularly in the bitemporal region. The rate of hair loss is stable.     No acne. She has had dark coarse terminal hair at her upper lip and chin - chronic. No deepening of voice or increase in muscle mass.    She has 2 children, did not have any issue with fertility. Her menses were always regular.     No prior use of anti-androgen therapy, minoxidil, or other therapies for hair loss outside of biotin - hasn't helped much. She has not seen a dermatologist for this issue.     History of a gastric bypass and the indication was weight loss. She takes calcium, vitamin D, a MVI, iron, B-complex, B12, zinc.    She has not been on any supplements that contain androgens or raise androgens.     She has an older sister with similar hair loss. Another sister without hair loss.     Weight has decreased gradually, about 20 # over the past few years, BMI 22.6 kg/m2.     Objective:    BMI 22.6 kg/meters squared, blood pressure 122/82.  Appears well.  She does have thinning of the hair at the vertex and in the right temporal region.  No " cushingoid features.  No features of growth hormone excess.  No hirsutism.  No acne.  Thyroid examined and normal.  No cervical lymphadenopathy.  Radial pulse with regular rate and rhythm.  Unable to examine nails given nail polish.    Assessment/Plan:    # Hair loss    We will evaluate for a hormonal etiology of hair loss and I ordered androgens and IGF-I.  Given her prior gastric bypass we will also look for vitamin/mineral deficiency as an etiology and I ordered minerals, vitamin D, ferritin, B12, zinc.    We also reviewed her thyroid history and she never had hypothyroidism.  There is no indication for use of thyroid hormone.  Therefore she will stop levothyroxine.  We will also check her TSH.  She will do these labs in about 6 weeks and hold biotin for 3 days before the lab draw.  I did let her know that there is no risk with taking her low-dose of levothyroxine so if she wishes to remain on it or restart it that would be fine but it will not be helpful in her situation and has no role in treating hair loss.    We reviewed that if we do not identify an etiology of her hair loss that she can follow-up with a dermatologist to see if there is any indication for biopsy and to discuss therapy such as minoxidil.  She is already scheduled with her dermatologist for June that she regularly sees.    45 minutes spent on the date of the encounter doing chart review, history and exam, documentation and further activities as noted above.       Again, thank you for allowing me to participate in the care of your patient.        Sincerely,        Rodney Vee MD

## 2023-02-10 NOTE — PROGRESS NOTES
"Subjective:    New patient, seen remotely by Dr. Ramachandran for hair loss and he started LT4 as noted below    Desi Vega is a 72 year old female who presents for hair loss. No prior assessment of androgens or IGF-1.     She is on LT4 25 mcg daily and this was started by Dr. Ramachandran 6/2018 because \"her TSH was 2.6 in the setting of hair loss\". TSH and free T4 have always been normal most recently 10/2022 it was 1.76. She takes LT4 25 mcg tabs. 2 tabs on Monday and Friday and the other days 1 tab.     No prior hypercalcemia. Osteopenia on DEXA in 2021.     10/2022: HbA1c 5.4%    Hair loss for about 10 years and is described as diffuse and particularly in the bitemporal region. The rate of hair loss is stable.     No acne. She has had dark coarse terminal hair at her upper lip and chin - chronic. No deepening of voice or increase in muscle mass.    She has 2 children, did not have any issue with fertility. Her menses were always regular.     No prior use of anti-androgen therapy, minoxidil, or other therapies for hair loss outside of biotin - hasn't helped much. She has not seen a dermatologist for this issue.     History of a gastric bypass and the indication was weight loss. She takes calcium, vitamin D, a MVI, iron, B-complex, B12, zinc.    She has not been on any supplements that contain androgens or raise androgens.     She has an older sister with similar hair loss. Another sister without hair loss.     Weight has decreased gradually, about 20 # over the past few years, BMI 22.6 kg/m2.     Objective:    BMI 22.6 kg/meters squared, blood pressure 122/82.  Appears well.  She does have thinning of the hair at the vertex and in the right temporal region.  No cushingoid features.  No features of growth hormone excess.  No hirsutism.  No acne.  Thyroid examined and normal.  No cervical lymphadenopathy.  Radial pulse with regular rate and rhythm.  Unable to examine nails given nail " polish.    Assessment/Plan:    # Hair loss    We will evaluate for a hormonal etiology of hair loss and I ordered androgens and IGF-I.  Given her prior gastric bypass we will also look for vitamin/mineral deficiency as an etiology and I ordered minerals, vitamin D, ferritin, B12, zinc.    We also reviewed her thyroid history and she never had hypothyroidism.  There is no indication for use of thyroid hormone.  Therefore she will stop levothyroxine.  We will also check her TSH.  She will do these labs in about 6 weeks and hold biotin for 3 days before the lab draw.  I did let her know that there is no risk with taking her low-dose of levothyroxine so if she wishes to remain on it or restart it that would be fine but it will not be helpful in her situation and has no role in treating hair loss.    We reviewed that if we do not identify an etiology of her hair loss that she can follow-up with a dermatologist to see if there is any indication for biopsy and to discuss therapy such as minoxidil.  She is already scheduled with her dermatologist for June that she regularly sees.    45 minutes spent on the date of the encounter doing chart review, history and exam, documentation and further activities as noted above.

## 2023-03-03 LAB
MDC_IDC_LEAD_IMPLANT_DT: NORMAL
MDC_IDC_LEAD_IMPLANT_DT: NORMAL
MDC_IDC_LEAD_LOCATION: NORMAL
MDC_IDC_LEAD_LOCATION: NORMAL
MDC_IDC_LEAD_MFG: NORMAL
MDC_IDC_LEAD_MFG: NORMAL
MDC_IDC_LEAD_MODEL: NORMAL
MDC_IDC_LEAD_MODEL: NORMAL
MDC_IDC_LEAD_POLARITY_TYPE: NORMAL
MDC_IDC_LEAD_POLARITY_TYPE: NORMAL
MDC_IDC_LEAD_SERIAL: NORMAL
MDC_IDC_LEAD_SERIAL: NORMAL
MDC_IDC_LEAD_SPECIAL_FUNCTION: NORMAL
MDC_IDC_LEAD_SPECIAL_FUNCTION: NORMAL
MDC_IDC_MSMT_BATTERY_DTM: NORMAL
MDC_IDC_MSMT_BATTERY_IMPEDANCE: 2632 OHM
MDC_IDC_MSMT_BATTERY_REMAINING_LONGEVITY: 25 MO
MDC_IDC_MSMT_BATTERY_STATUS: NORMAL
MDC_IDC_MSMT_BATTERY_VOLTAGE: 2.74 V
MDC_IDC_MSMT_LEADCHNL_RA_IMPEDANCE_VALUE: 769 OHM
MDC_IDC_MSMT_LEADCHNL_RA_PACING_THRESHOLD_AMPLITUDE: 0.75 V
MDC_IDC_MSMT_LEADCHNL_RA_PACING_THRESHOLD_AMPLITUDE: 0.75 V
MDC_IDC_MSMT_LEADCHNL_RA_PACING_THRESHOLD_AMPLITUDE: 0.88 V
MDC_IDC_MSMT_LEADCHNL_RA_PACING_THRESHOLD_PULSEWIDTH: 0.4 MS
MDC_IDC_MSMT_LEADCHNL_RA_SENSING_INTR_AMPL: 1.4 MV
MDC_IDC_MSMT_LEADCHNL_RV_IMPEDANCE_VALUE: 650 OHM
MDC_IDC_MSMT_LEADCHNL_RV_PACING_THRESHOLD_AMPLITUDE: 1.5 V
MDC_IDC_MSMT_LEADCHNL_RV_PACING_THRESHOLD_AMPLITUDE: 1.5 V
MDC_IDC_MSMT_LEADCHNL_RV_PACING_THRESHOLD_PULSEWIDTH: 1 MS
MDC_IDC_MSMT_LEADCHNL_RV_PACING_THRESHOLD_PULSEWIDTH: 1 MS
MDC_IDC_MSMT_LEADCHNL_RV_SENSING_INTR_AMPL: 11.2 MV
MDC_IDC_PG_IMPLANT_DTM: NORMAL
MDC_IDC_PG_MFG: NORMAL
MDC_IDC_PG_MODEL: NORMAL
MDC_IDC_PG_SERIAL: NORMAL
MDC_IDC_PG_TYPE: NORMAL
MDC_IDC_SESS_CLINIC_NAME: NORMAL
MDC_IDC_SESS_DTM: NORMAL
MDC_IDC_SESS_TYPE: NORMAL
MDC_IDC_SET_BRADY_AT_MODE_SWITCH_MODE: NORMAL
MDC_IDC_SET_BRADY_AT_MODE_SWITCH_RATE: 175 {BEATS}/MIN
MDC_IDC_SET_BRADY_LOWRATE: 60 {BEATS}/MIN
MDC_IDC_SET_BRADY_MAX_SENSOR_RATE: 130 {BEATS}/MIN
MDC_IDC_SET_BRADY_MAX_TRACKING_RATE: 130 {BEATS}/MIN
MDC_IDC_SET_BRADY_MODE: NORMAL
MDC_IDC_SET_BRADY_PAV_DELAY_LOW: 150 MS
MDC_IDC_SET_BRADY_SAV_DELAY_LOW: 120 MS
MDC_IDC_SET_LEADCHNL_RA_PACING_AMPLITUDE: NORMAL
MDC_IDC_SET_LEADCHNL_RA_PACING_CAPTURE_MODE: NORMAL
MDC_IDC_SET_LEADCHNL_RA_PACING_POLARITY: NORMAL
MDC_IDC_SET_LEADCHNL_RA_PACING_PULSEWIDTH: 0.4 MS
MDC_IDC_SET_LEADCHNL_RA_SENSING_POLARITY: NORMAL
MDC_IDC_SET_LEADCHNL_RA_SENSING_SENSITIVITY: 0.35 MV
MDC_IDC_SET_LEADCHNL_RV_PACING_AMPLITUDE: 3 V
MDC_IDC_SET_LEADCHNL_RV_PACING_CAPTURE_MODE: NORMAL
MDC_IDC_SET_LEADCHNL_RV_PACING_POLARITY: NORMAL
MDC_IDC_SET_LEADCHNL_RV_PACING_PULSEWIDTH: 1 MS
MDC_IDC_SET_LEADCHNL_RV_SENSING_POLARITY: NORMAL
MDC_IDC_SET_LEADCHNL_RV_SENSING_SENSITIVITY: 5.6 MV
MDC_IDC_SET_ZONE_DETECTION_INTERVAL: 333.33 MS
MDC_IDC_SET_ZONE_DETECTION_INTERVAL: 342.86 MS
MDC_IDC_SET_ZONE_TYPE: NORMAL
MDC_IDC_SET_ZONE_TYPE: NORMAL
MDC_IDC_STAT_AT_BURDEN_PERCENT: 0 %
MDC_IDC_STAT_AT_DTM_END: NORMAL
MDC_IDC_STAT_AT_DTM_START: NORMAL
MDC_IDC_STAT_AT_MODE_SW_COUNT: 2
MDC_IDC_STAT_BRADY_AP_VP_PERCENT: 0 %
MDC_IDC_STAT_BRADY_AP_VS_PERCENT: 81 %
MDC_IDC_STAT_BRADY_AS_VP_PERCENT: 0 %
MDC_IDC_STAT_BRADY_AS_VS_PERCENT: 19 %
MDC_IDC_STAT_BRADY_DTM_END: NORMAL
MDC_IDC_STAT_BRADY_DTM_START: NORMAL
MDC_IDC_STAT_BRADY_RA_PERCENT_PACED: 81.2 %
MDC_IDC_STAT_BRADY_RV_PERCENT_PACED: 0 %
MDC_IDC_STAT_EPISODE_RECENT_COUNT: 0
MDC_IDC_STAT_EPISODE_RECENT_COUNT: 0
MDC_IDC_STAT_EPISODE_RECENT_COUNT: 1
MDC_IDC_STAT_EPISODE_RECENT_COUNT_DTM_END: NORMAL
MDC_IDC_STAT_EPISODE_RECENT_COUNT_DTM_START: NORMAL
MDC_IDC_STAT_EPISODE_TYPE: NORMAL

## 2023-03-17 ENCOUNTER — ANCILLARY PROCEDURE (OUTPATIENT)
Dept: MAMMOGRAPHY | Facility: HOSPITAL | Age: 73
End: 2023-03-17
Attending: FAMILY MEDICINE
Payer: COMMERCIAL

## 2023-03-17 ENCOUNTER — LAB (OUTPATIENT)
Dept: LAB | Facility: CLINIC | Age: 73
End: 2023-03-17
Payer: COMMERCIAL

## 2023-03-17 DIAGNOSIS — L68.0 HIRSUTISM: ICD-10-CM

## 2023-03-17 DIAGNOSIS — L65.9 HAIR LOSS: Primary | ICD-10-CM

## 2023-03-17 DIAGNOSIS — Z12.31 SCREENING MAMMOGRAM FOR BREAST CANCER: ICD-10-CM

## 2023-03-17 DIAGNOSIS — E61.8 MINERAL DEFICIENCY: ICD-10-CM

## 2023-03-17 DIAGNOSIS — E56.9 VITAMIN DEFICIENCY: ICD-10-CM

## 2023-03-17 DIAGNOSIS — Z98.84 H/O GASTRIC BYPASS: ICD-10-CM

## 2023-03-17 LAB
ALBUMIN SERPL BCG-MCNC: 4.2 G/DL (ref 3.5–5.2)
CALCIUM SERPL-MCNC: 9.2 MG/DL (ref 8.8–10.2)
FERRITIN SERPL-MCNC: 201 NG/ML (ref 11–328)
PHOSPHATE SERPL-MCNC: 3.9 MG/DL (ref 2.5–4.5)
SHBG SERPL-SCNC: 69 NMOL/L (ref 30–135)
TSH SERPL DL<=0.005 MIU/L-ACNC: 2.69 UIU/ML (ref 0.3–4.2)
VIT B12 SERPL-MCNC: 460 PG/ML (ref 232–1245)

## 2023-03-17 PROCEDURE — 82157 ASSAY OF ANDROSTENEDIONE: CPT | Mod: 90

## 2023-03-17 PROCEDURE — 84305 ASSAY OF SOMATOMEDIN: CPT

## 2023-03-17 PROCEDURE — 83921 ORGANIC ACID SINGLE QUANT: CPT

## 2023-03-17 PROCEDURE — 77067 SCR MAMMO BI INCL CAD: CPT

## 2023-03-17 PROCEDURE — 36415 COLL VENOUS BLD VENIPUNCTURE: CPT

## 2023-03-17 PROCEDURE — 84443 ASSAY THYROID STIM HORMONE: CPT

## 2023-03-17 PROCEDURE — 99000 SPECIMEN HANDLING OFFICE-LAB: CPT

## 2023-03-17 PROCEDURE — 82310 ASSAY OF CALCIUM: CPT

## 2023-03-17 PROCEDURE — 82040 ASSAY OF SERUM ALBUMIN: CPT

## 2023-03-17 PROCEDURE — 84100 ASSAY OF PHOSPHORUS: CPT

## 2023-03-17 PROCEDURE — 82306 VITAMIN D 25 HYDROXY: CPT | Mod: GA

## 2023-03-17 PROCEDURE — 82728 ASSAY OF FERRITIN: CPT

## 2023-03-17 PROCEDURE — 84630 ASSAY OF ZINC: CPT | Mod: 90

## 2023-03-17 PROCEDURE — 84403 ASSAY OF TOTAL TESTOSTERONE: CPT

## 2023-03-17 PROCEDURE — 82627 DEHYDROEPIANDROSTERONE: CPT

## 2023-03-17 PROCEDURE — 84270 ASSAY OF SEX HORMONE GLOBUL: CPT

## 2023-03-17 PROCEDURE — 82607 VITAMIN B-12: CPT

## 2023-03-18 LAB — ZINC SERPL-MCNC: 59 UG/DL

## 2023-03-20 LAB — DHEA-S SERPL-MCNC: 34 UG/DL (ref 35–430)

## 2023-03-21 LAB
ANDROST SERPL-MCNC: 0.36 NG/ML
IGF-I BLD-MCNC: 99 NG/ML (ref 22–220)
METHYLMALONATE SERPL-SCNC: 0.36 UMOL/L (ref 0–0.4)
TESTOST FREE SERPL-MCNC: 0.15 NG/DL
TESTOST SERPL-MCNC: 14 NG/DL (ref 8–60)

## 2023-03-22 LAB
DEPRECATED CALCIDIOL+CALCIFEROL SERPL-MC: <58 UG/L (ref 20–75)
VITAMIN D2 SERPL-MCNC: <5 UG/L
VITAMIN D3 SERPL-MCNC: 53 UG/L

## 2023-03-30 ENCOUNTER — TRANSFERRED RECORDS (OUTPATIENT)
Dept: HEALTH INFORMATION MANAGEMENT | Facility: CLINIC | Age: 73
End: 2023-03-30
Payer: COMMERCIAL

## 2023-03-31 DIAGNOSIS — K21.00 GASTROESOPHAGEAL REFLUX DISEASE WITH ESOPHAGITIS WITHOUT HEMORRHAGE: ICD-10-CM

## 2023-04-03 NOTE — TELEPHONE ENCOUNTER
Routing to provider to determine if refill for a year is appropriate.     Mary Yu RN on 4/3/2023 at 10:30 AM

## 2023-04-10 ENCOUNTER — TRANSFERRED RECORDS (OUTPATIENT)
Dept: HEALTH INFORMATION MANAGEMENT | Facility: CLINIC | Age: 73
End: 2023-04-10
Payer: COMMERCIAL

## 2023-05-25 ENCOUNTER — TRANSFERRED RECORDS (OUTPATIENT)
Dept: HEALTH INFORMATION MANAGEMENT | Facility: CLINIC | Age: 73
End: 2023-05-25
Payer: COMMERCIAL

## 2023-06-12 ENCOUNTER — HOSPITAL ENCOUNTER (OUTPATIENT)
Dept: CARDIOLOGY | Facility: HOSPITAL | Age: 73
Discharge: HOME | End: 2023-06-12
Attending: INTERNAL MEDICINE | Admitting: INTERNAL MEDICINE
Payer: COMMERCIAL

## 2023-06-12 DIAGNOSIS — I42.8 NON-ISCHEMIC CARDIOMYOPATHY (H): ICD-10-CM

## 2023-06-12 LAB — LVEF ECHO: NORMAL

## 2023-06-12 PROCEDURE — 93306 TTE W/DOPPLER COMPLETE: CPT | Mod: 26 | Performed by: INTERNAL MEDICINE

## 2023-06-12 PROCEDURE — 999N000208 ECHOCARDIOGRAM COMPLETE

## 2023-06-12 PROCEDURE — 255N000002 HC RX 255 OP 636: Performed by: INTERNAL MEDICINE

## 2023-06-12 RX ADMIN — PERFLUTREN 2 ML: 6.52 INJECTION, SUSPENSION INTRAVENOUS at 11:43

## 2023-06-19 ENCOUNTER — ANCILLARY PROCEDURE (OUTPATIENT)
Dept: CARDIOLOGY | Facility: CLINIC | Age: 73
End: 2023-06-19
Attending: INTERNAL MEDICINE
Payer: COMMERCIAL

## 2023-06-19 DIAGNOSIS — I49.5 SICK SINUS SYNDROME (H): ICD-10-CM

## 2023-06-19 DIAGNOSIS — Z95.0 PACEMAKER: ICD-10-CM

## 2023-06-19 LAB
MDC_IDC_LEAD_IMPLANT_DT: NORMAL
MDC_IDC_LEAD_IMPLANT_DT: NORMAL
MDC_IDC_LEAD_LOCATION: NORMAL
MDC_IDC_LEAD_LOCATION: NORMAL
MDC_IDC_LEAD_MFG: NORMAL
MDC_IDC_LEAD_MFG: NORMAL
MDC_IDC_LEAD_MODEL: NORMAL
MDC_IDC_LEAD_MODEL: NORMAL
MDC_IDC_LEAD_POLARITY_TYPE: NORMAL
MDC_IDC_LEAD_POLARITY_TYPE: NORMAL
MDC_IDC_LEAD_SERIAL: NORMAL
MDC_IDC_LEAD_SERIAL: NORMAL
MDC_IDC_LEAD_SPECIAL_FUNCTION: NORMAL
MDC_IDC_LEAD_SPECIAL_FUNCTION: NORMAL
MDC_IDC_MSMT_BATTERY_DTM: NORMAL
MDC_IDC_MSMT_BATTERY_IMPEDANCE: 2582 OHM
MDC_IDC_MSMT_BATTERY_REMAINING_LONGEVITY: 26 MO
MDC_IDC_MSMT_BATTERY_STATUS: NORMAL
MDC_IDC_MSMT_BATTERY_VOLTAGE: 2.74 V
MDC_IDC_MSMT_LEADCHNL_RA_IMPEDANCE_VALUE: 791 OHM
MDC_IDC_MSMT_LEADCHNL_RA_PACING_THRESHOLD_AMPLITUDE: 0.88 V
MDC_IDC_MSMT_LEADCHNL_RA_PACING_THRESHOLD_PULSEWIDTH: 0.4 MS
MDC_IDC_MSMT_LEADCHNL_RV_IMPEDANCE_VALUE: 629 OHM
MDC_IDC_PG_IMPLANT_DTM: NORMAL
MDC_IDC_PG_MFG: NORMAL
MDC_IDC_PG_MODEL: NORMAL
MDC_IDC_PG_SERIAL: NORMAL
MDC_IDC_PG_TYPE: NORMAL
MDC_IDC_SESS_CLINIC_NAME: NORMAL
MDC_IDC_SESS_DTM: NORMAL
MDC_IDC_SESS_TYPE: NORMAL
MDC_IDC_SET_BRADY_AT_MODE_SWITCH_MODE: NORMAL
MDC_IDC_SET_BRADY_AT_MODE_SWITCH_RATE: 175 {BEATS}/MIN
MDC_IDC_SET_BRADY_LOWRATE: 60 {BEATS}/MIN
MDC_IDC_SET_BRADY_MAX_SENSOR_RATE: 130 {BEATS}/MIN
MDC_IDC_SET_BRADY_MAX_TRACKING_RATE: 130 {BEATS}/MIN
MDC_IDC_SET_BRADY_MODE: NORMAL
MDC_IDC_SET_BRADY_PAV_DELAY_LOW: 150 MS
MDC_IDC_SET_BRADY_SAV_DELAY_LOW: 120 MS
MDC_IDC_SET_LEADCHNL_RA_PACING_AMPLITUDE: 1.75 V
MDC_IDC_SET_LEADCHNL_RA_PACING_CAPTURE_MODE: NORMAL
MDC_IDC_SET_LEADCHNL_RA_PACING_POLARITY: NORMAL
MDC_IDC_SET_LEADCHNL_RA_PACING_PULSEWIDTH: 0.4 MS
MDC_IDC_SET_LEADCHNL_RA_SENSING_POLARITY: NORMAL
MDC_IDC_SET_LEADCHNL_RA_SENSING_SENSITIVITY: 0.5 MV
MDC_IDC_SET_LEADCHNL_RV_PACING_AMPLITUDE: 3 V
MDC_IDC_SET_LEADCHNL_RV_PACING_CAPTURE_MODE: NORMAL
MDC_IDC_SET_LEADCHNL_RV_PACING_POLARITY: NORMAL
MDC_IDC_SET_LEADCHNL_RV_PACING_PULSEWIDTH: 1 MS
MDC_IDC_SET_LEADCHNL_RV_SENSING_POLARITY: NORMAL
MDC_IDC_SET_LEADCHNL_RV_SENSING_SENSITIVITY: 5.6 MV
MDC_IDC_SET_ZONE_DETECTION_INTERVAL: 333.33 MS
MDC_IDC_SET_ZONE_DETECTION_INTERVAL: 342.86 MS
MDC_IDC_SET_ZONE_TYPE: NORMAL
MDC_IDC_SET_ZONE_TYPE: NORMAL
MDC_IDC_STAT_AT_BURDEN_PERCENT: 0 %
MDC_IDC_STAT_AT_DTM_END: NORMAL
MDC_IDC_STAT_AT_DTM_START: NORMAL
MDC_IDC_STAT_AT_MODE_SW_COUNT: 1
MDC_IDC_STAT_BRADY_AP_VP_PERCENT: 0 %
MDC_IDC_STAT_BRADY_AP_VS_PERCENT: 78 %
MDC_IDC_STAT_BRADY_AS_VP_PERCENT: 0 %
MDC_IDC_STAT_BRADY_AS_VS_PERCENT: 22 %
MDC_IDC_STAT_BRADY_DTM_END: NORMAL
MDC_IDC_STAT_BRADY_DTM_START: NORMAL
MDC_IDC_STAT_EPISODE_RECENT_COUNT: 0
MDC_IDC_STAT_EPISODE_RECENT_COUNT: 0
MDC_IDC_STAT_EPISODE_RECENT_COUNT_DTM_END: NORMAL
MDC_IDC_STAT_EPISODE_RECENT_COUNT_DTM_END: NORMAL
MDC_IDC_STAT_EPISODE_RECENT_COUNT_DTM_START: NORMAL
MDC_IDC_STAT_EPISODE_RECENT_COUNT_DTM_START: NORMAL
MDC_IDC_STAT_EPISODE_TYPE: NORMAL
MDC_IDC_STAT_EPISODE_TYPE: NORMAL

## 2023-06-19 PROCEDURE — 93294 REM INTERROG EVL PM/LDLS PM: CPT | Performed by: INTERNAL MEDICINE

## 2023-06-19 PROCEDURE — 93296 REM INTERROG EVL PM/IDS: CPT | Performed by: INTERNAL MEDICINE

## 2023-06-19 NOTE — RESULT ENCOUNTER NOTE
Echocardiogram was personally reviewed.  There is a decline in ejection fraction when compared to the prior echocardiogram report. Rjrction fraction appears around 40%.  Do not believe she has a compatible MRI device therefore I would like her to have a Lexiscan nuclear stress test primarily to reassess LV function in a different manner.  She does not really paced in the ventricle so I do not believe that this is pacemaker induced decline in function.  Sent her a MyChart note but we will have to see if she responds.  Also like her to have a BNP.  Thanks BLAKE Wells

## 2023-06-20 DIAGNOSIS — I42.8 NON-ISCHEMIC CARDIOMYOPATHY (H): ICD-10-CM

## 2023-06-20 DIAGNOSIS — I51.9 LV DYSFUNCTION: Primary | ICD-10-CM

## 2023-06-20 DIAGNOSIS — I11.0 HYPERTENSIVE HEART DISEASE WITH HEART FAILURE (H): ICD-10-CM

## 2023-06-20 NOTE — PROGRESS NOTES
Lexiscan nuclear stress test ordered.    BNP ordered.  Message sent to scheduling.  -ra    ===View-only below this line===  ----- Message -----  From: Orlin Wells MD  Sent: 6/19/2023   5:24 PM CDT  To: Evette Hackett RN    We please schedule a Lexiscan nuclear stress test.  We have communicated via [employer].  Thank you BLAKE Wells MD   6/19/2023  2:17 PM CDT       Echocardiogram was personally reviewed.  There is a decline in ejection fraction when compared to the prior echocardiogram report. Rjrction fraction appears around 40%.  Do not believe she has a compatible MRI device therefore I would like her to have a Lexiscan nuclear stress test primarily to reassess LV function in a different manner.  She does not really paced in the ventricle so I do not believe that this is pacemaker induced decline in function.  Sent her a MyChart note but we will have to see if she responds.  Also like her to have a BNP.  Thanks BLAKE Wells

## 2023-07-11 ENCOUNTER — HOSPITAL ENCOUNTER (OUTPATIENT)
Dept: CARDIOLOGY | Facility: HOSPITAL | Age: 73
Discharge: HOME | End: 2023-07-11
Attending: INTERNAL MEDICINE
Payer: COMMERCIAL

## 2023-07-11 ENCOUNTER — HOSPITAL ENCOUNTER (OUTPATIENT)
Dept: NUCLEAR MEDICINE | Facility: HOSPITAL | Age: 73
Discharge: HOME | End: 2023-07-11
Attending: INTERNAL MEDICINE
Payer: COMMERCIAL

## 2023-07-11 ENCOUNTER — LAB (OUTPATIENT)
Dept: CARDIOLOGY | Facility: CLINIC | Age: 73
End: 2023-07-11
Payer: COMMERCIAL

## 2023-07-11 DIAGNOSIS — I42.8 NON-ISCHEMIC CARDIOMYOPATHY (H): ICD-10-CM

## 2023-07-11 DIAGNOSIS — I51.9 LV DYSFUNCTION: ICD-10-CM

## 2023-07-11 DIAGNOSIS — I11.0 HYPERTENSIVE HEART DISEASE WITH HEART FAILURE (H): ICD-10-CM

## 2023-07-11 LAB
CV STRESS CURRENT BP HE: NORMAL
CV STRESS CURRENT HR HE: 63
CV STRESS CURRENT HR HE: 64
CV STRESS CURRENT HR HE: 65
CV STRESS CURRENT HR HE: 68
CV STRESS CURRENT HR HE: 69
CV STRESS CURRENT HR HE: 74
CV STRESS CURRENT HR HE: 75
CV STRESS CURRENT HR HE: 80
CV STRESS CURRENT HR HE: 82
CV STRESS CURRENT HR HE: 89
CV STRESS CURRENT HR HE: 92
CV STRESS CURRENT HR HE: 93
CV STRESS CURRENT HR HE: 94
CV STRESS CURRENT HR HE: 98
CV STRESS CURRENT HR HE: 99
CV STRESS DEVIATION TIME HE: NORMAL
CV STRESS ECHO PERCENT HR HE: NORMAL
CV STRESS EXERCISE STAGE HE: NORMAL
CV STRESS FINAL RESTING BP HE: NORMAL
CV STRESS FINAL RESTING HR HE: 63
CV STRESS MAX HR HE: 101
CV STRESS MAX TREADMILL GRADE HE: 0
CV STRESS MAX TREADMILL SPEED HE: 0
CV STRESS PEAK DIA BP HE: NORMAL
CV STRESS PEAK SYS BP HE: NORMAL
CV STRESS PHASE HE: NORMAL
CV STRESS PROTOCOL HE: NORMAL
CV STRESS RESTING PT POSITION HE: NORMAL
CV STRESS ST DEVIATION AMOUNT HE: NORMAL
CV STRESS ST DEVIATION ELEVATION HE: NORMAL
CV STRESS ST EVELATION AMOUNT HE: NORMAL
CV STRESS TEST TYPE HE: NORMAL
CV STRESS TOTAL STAGE TIME MIN 1 HE: NORMAL
NT-PROBNP SERPL-MCNC: 422 PG/ML (ref 0–900)
NUC STRESS EJECTION FRACTION: 56 %
RATE PRESSURE PRODUCT: NORMAL
STRESS ECHO BASELINE DIASTOLIC HE: 60
STRESS ECHO BASELINE HR: 63
STRESS ECHO BASELINE SYSTOLIC BP: 131
STRESS ECHO CALCULATED PERCENT HR: 70 %
STRESS ECHO LAST STRESS DIASTOLIC BP: 55
STRESS ECHO LAST STRESS HR: 94
STRESS ECHO LAST STRESS SYSTOLIC BP: 103
STRESS ECHO TARGET HR: 145

## 2023-07-11 PROCEDURE — 83880 ASSAY OF NATRIURETIC PEPTIDE: CPT

## 2023-07-11 PROCEDURE — 343N000001 HC RX 343: Performed by: INTERNAL MEDICINE

## 2023-07-11 PROCEDURE — A9500 TC99M SESTAMIBI: HCPCS | Performed by: INTERNAL MEDICINE

## 2023-07-11 PROCEDURE — 78452 HT MUSCLE IMAGE SPECT MULT: CPT | Mod: 26 | Performed by: INTERNAL MEDICINE

## 2023-07-11 PROCEDURE — 93016 CV STRESS TEST SUPVJ ONLY: CPT | Performed by: INTERNAL MEDICINE

## 2023-07-11 PROCEDURE — 250N000011 HC RX IP 250 OP 636: Mod: JZ | Performed by: INTERNAL MEDICINE

## 2023-07-11 PROCEDURE — 93018 CV STRESS TEST I&R ONLY: CPT | Performed by: INTERNAL MEDICINE

## 2023-07-11 PROCEDURE — 93017 CV STRESS TEST TRACING ONLY: CPT

## 2023-07-11 PROCEDURE — 78452 HT MUSCLE IMAGE SPECT MULT: CPT

## 2023-07-11 PROCEDURE — 36415 COLL VENOUS BLD VENIPUNCTURE: CPT

## 2023-07-11 RX ORDER — REGADENOSON 0.08 MG/ML
0.4 INJECTION, SOLUTION INTRAVENOUS ONCE
Status: COMPLETED | OUTPATIENT
Start: 2023-07-11 | End: 2023-07-11

## 2023-07-11 RX ORDER — AMINOPHYLLINE 25 MG/ML
50 INJECTION, SOLUTION INTRAVENOUS
Status: DISCONTINUED | OUTPATIENT
Start: 2023-07-11 | End: 2023-07-11 | Stop reason: HOSPADM

## 2023-07-11 RX ADMIN — Medication 31.2 MILLICURIE: at 12:50

## 2023-07-11 RX ADMIN — AMINOPHYLLINE 50 MG: 25 INJECTION, SOLUTION INTRAVENOUS at 12:52

## 2023-07-11 RX ADMIN — Medication 8.8 MILLICURIE: at 11:18

## 2023-07-11 RX ADMIN — REGADENOSON 0.4 MG: 0.08 INJECTION, SOLUTION INTRAVENOUS at 12:47

## 2023-07-11 RX ADMIN — AMINOPHYLLINE 50 MG: 25 INJECTION, SOLUTION INTRAVENOUS at 13:57

## 2023-07-11 NOTE — RESULT ENCOUNTER NOTE
proBNP is within normal limits.  I had planned on a scan nuclear stress test to reassess LV function as I do not think that her pacemaker is MRI compatible.  Looks like this is scheduled for today and once it comes by my desk I will review.  Thank you BLAKE Wells

## 2023-07-16 NOTE — RESULT ENCOUNTER NOTE
Nuclear suggests ef to be higher than on the echo at 56%, there is a suggestion of a non transmural infarct, suspect artifact ? From pacer. I will offer a cta to further define. Note sent via my chart.mdg

## 2023-07-17 DIAGNOSIS — I51.9 LV DYSFUNCTION: ICD-10-CM

## 2023-07-17 DIAGNOSIS — R94.39 ABNORMAL NUCLEAR STRESS TEST: Primary | ICD-10-CM

## 2023-07-17 NOTE — PROGRESS NOTES
CCTA ordered.  -ra    ===View-only below this line===  ----- Message -----  From: Orlin Wells MD  Sent: 7/16/2023  12:32 PM CDT  To: Evette Hackett RN    Can we please arrange a ct angio  ty mdg

## 2023-07-19 ENCOUNTER — TELEPHONE (OUTPATIENT)
Dept: CARDIOLOGY | Facility: CLINIC | Age: 73
End: 2023-07-19

## 2023-07-19 NOTE — TELEPHONE ENCOUNTER
[facility] Call Center    Phone Message    May a detailed message be left on voicemail: yes     Reason for Call: Other: Please call the patient to schedule this test at [place] but pt prefers [facility] if it is done there     Action Taken: Other: Cardiology    Travel Screening: Not Applicable     Thank you!  [facility]

## 2023-08-01 ENCOUNTER — HOSPITAL ENCOUNTER (OUTPATIENT)
Dept: CT IMAGING | Facility: CLINIC | Age: 73
Discharge: HOME | End: 2023-08-01
Attending: INTERNAL MEDICINE | Admitting: INTERNAL MEDICINE
Payer: COMMERCIAL

## 2023-08-01 VITALS — SYSTOLIC BLOOD PRESSURE: 106 MMHG | DIASTOLIC BLOOD PRESSURE: 61 MMHG

## 2023-08-01 DIAGNOSIS — I51.9 LV DYSFUNCTION: ICD-10-CM

## 2023-08-01 DIAGNOSIS — R94.39 ABNORMAL NUCLEAR STRESS TEST: ICD-10-CM

## 2023-08-01 LAB
BSA FOR ECHO PROCEDURE: 0 M2
CREAT BLD-MCNC: 0.9 MG/DL (ref 0.6–1.1)
GFR SERPL CREATININE-BSD FRML MDRD: >60 ML/MIN/1.73M2

## 2023-08-01 PROCEDURE — 250N000011 HC RX IP 250 OP 636: Mod: JZ | Performed by: INTERNAL MEDICINE

## 2023-08-01 PROCEDURE — 75574 CT ANGIO HRT W/3D IMAGE: CPT

## 2023-08-01 PROCEDURE — 75574 CT ANGIO HRT W/3D IMAGE: CPT | Mod: 26 | Performed by: INTERNAL MEDICINE

## 2023-08-01 PROCEDURE — 250N000013 HC RX MED GY IP 250 OP 250 PS 637: Performed by: INTERNAL MEDICINE

## 2023-08-01 PROCEDURE — 82565 ASSAY OF CREATININE: CPT

## 2023-08-01 RX ORDER — DILTIAZEM HYDROCHLORIDE 5 MG/ML
10 INJECTION INTRAVENOUS
Status: DISCONTINUED | OUTPATIENT
Start: 2023-08-01 | End: 2023-08-02 | Stop reason: HOSPADM

## 2023-08-01 RX ORDER — IOPAMIDOL 755 MG/ML
100 INJECTION, SOLUTION INTRAVASCULAR ONCE
Status: COMPLETED | OUTPATIENT
Start: 2023-08-01 | End: 2023-08-01

## 2023-08-01 RX ORDER — METOPROLOL TARTRATE 1 MG/ML
5 INJECTION, SOLUTION INTRAVENOUS
Status: DISCONTINUED | OUTPATIENT
Start: 2023-08-01 | End: 2023-08-02 | Stop reason: HOSPADM

## 2023-08-01 RX ORDER — NITROGLYCERIN 0.4 MG/1
0.4 TABLET SUBLINGUAL ONCE
Status: COMPLETED | OUTPATIENT
Start: 2023-08-01 | End: 2023-08-01

## 2023-08-01 RX ORDER — DILTIAZEM HYDROCHLORIDE 5 MG/ML
5 INJECTION INTRAVENOUS
Status: DISCONTINUED | OUTPATIENT
Start: 2023-08-01 | End: 2023-08-02 | Stop reason: HOSPADM

## 2023-08-01 RX ADMIN — IOPAMIDOL 100 ML: 755 INJECTION, SOLUTION INTRAVENOUS at 07:33

## 2023-08-01 RX ADMIN — NITROGLYCERIN 0.4 MG: 0.4 TABLET SUBLINGUAL at 07:24

## 2023-08-03 NOTE — RESULT ENCOUNTER NOTE
Hetal note sent earlier.  She does not have significant obstructive coronary artery disease.  Would recommend repeating echocardiogram at the end of the year.

## 2023-09-06 ENCOUNTER — HOSPITAL ENCOUNTER (EMERGENCY)
Facility: HOSPITAL | Age: 73
Discharge: HOME | End: 2023-09-06
Attending: FAMILY MEDICINE | Admitting: FAMILY MEDICINE
Payer: COMMERCIAL

## 2023-09-06 ENCOUNTER — TELEPHONE (OUTPATIENT)
Dept: FAMILY MEDICINE | Facility: CLINIC | Age: 73
End: 2023-09-06

## 2023-09-06 VITALS
RESPIRATION RATE: 18 BRPM | WEIGHT: 130 LBS | HEART RATE: 75 BPM | OXYGEN SATURATION: 97 % | TEMPERATURE: 99.4 F | BODY MASS INDEX: 22.47 KG/M2 | SYSTOLIC BLOOD PRESSURE: 127 MMHG | DIASTOLIC BLOOD PRESSURE: 68 MMHG

## 2023-09-06 DIAGNOSIS — N39.0 ACUTE LOWER UTI: ICD-10-CM

## 2023-09-06 LAB
ALBUMIN UR-MCNC: 100 MG/DL
ANION GAP SERPL CALCULATED.3IONS-SCNC: 9 MMOL/L (ref 7–15)
APPEARANCE UR: ABNORMAL
BACTERIA #/AREA URNS HPF: ABNORMAL /HPF
BASOPHILS # BLD AUTO: 0 10E3/UL (ref 0–0.2)
BASOPHILS NFR BLD AUTO: 0 %
BILIRUB UR QL STRIP: NEGATIVE
BUN SERPL-MCNC: 13.9 MG/DL (ref 8–23)
CALCIUM SERPL-MCNC: 9 MG/DL (ref 8.8–10.2)
CHLORIDE SERPL-SCNC: 101 MMOL/L (ref 98–107)
COLOR UR AUTO: YELLOW
CREAT SERPL-MCNC: 0.91 MG/DL (ref 0.51–0.95)
DEPRECATED HCO3 PLAS-SCNC: 25 MMOL/L (ref 22–29)
EGFRCR SERPLBLD CKD-EPI 2021: 65 ML/MIN/1.73M2
EOSINOPHIL # BLD AUTO: 0 10E3/UL (ref 0–0.7)
EOSINOPHIL NFR BLD AUTO: 0 %
ERYTHROCYTE [DISTWIDTH] IN BLOOD BY AUTOMATED COUNT: 13.9 % (ref 10–15)
FLUAV RNA SPEC QL NAA+PROBE: NEGATIVE
FLUBV RNA RESP QL NAA+PROBE: NEGATIVE
GLUCOSE SERPL-MCNC: 146 MG/DL (ref 70–99)
GLUCOSE UR STRIP-MCNC: NEGATIVE MG/DL
HCT VFR BLD AUTO: 39.2 % (ref 35–47)
HGB BLD-MCNC: 12.8 G/DL (ref 11.7–15.7)
HGB UR QL STRIP: ABNORMAL
HOLD SPECIMEN: NORMAL
IMM GRANULOCYTES # BLD: 0 10E3/UL
IMM GRANULOCYTES NFR BLD: 0 %
KETONES UR STRIP-MCNC: NEGATIVE MG/DL
LEUKOCYTE ESTERASE UR QL STRIP: ABNORMAL
LYMPHOCYTES # BLD AUTO: 0.4 10E3/UL (ref 0.8–5.3)
LYMPHOCYTES NFR BLD AUTO: 4 %
MCH RBC QN AUTO: 32 PG (ref 26.5–33)
MCHC RBC AUTO-ENTMCNC: 32.7 G/DL (ref 31.5–36.5)
MCV RBC AUTO: 98 FL (ref 78–100)
MONOCYTES # BLD AUTO: 0.6 10E3/UL (ref 0–1.3)
MONOCYTES NFR BLD AUTO: 6 %
MUCOUS THREADS #/AREA URNS LPF: PRESENT /LPF
NEUTROPHILS # BLD AUTO: 8.6 10E3/UL (ref 1.6–8.3)
NEUTROPHILS NFR BLD AUTO: 90 %
NITRATE UR QL: POSITIVE
NRBC # BLD AUTO: 0 10E3/UL
NRBC BLD AUTO-RTO: 0 /100
PH UR STRIP: 6 [PH] (ref 5–7)
PLATELET # BLD AUTO: 169 10E3/UL (ref 150–450)
POTASSIUM SERPL-SCNC: 3.9 MMOL/L (ref 3.4–5.3)
RBC # BLD AUTO: 4 10E6/UL (ref 3.8–5.2)
RBC URINE: 0 /HPF
RSV RNA SPEC NAA+PROBE: NEGATIVE
SARS-COV-2 RNA RESP QL NAA+PROBE: NEGATIVE
SODIUM SERPL-SCNC: 135 MMOL/L (ref 136–145)
SP GR UR STRIP: 1.02 (ref 1–1.03)
SQUAMOUS EPITHELIAL: <1 /HPF
UROBILINOGEN UR STRIP-MCNC: <2 MG/DL
WBC # BLD AUTO: 9.5 10E3/UL (ref 4–11)
WBC CLUMPS #/AREA URNS HPF: PRESENT /HPF
WBC URINE: >182 /HPF

## 2023-09-06 PROCEDURE — 82310 ASSAY OF CALCIUM: CPT | Performed by: FAMILY MEDICINE

## 2023-09-06 PROCEDURE — 250N000011 HC RX IP 250 OP 636: Mod: JZ | Performed by: FAMILY MEDICINE

## 2023-09-06 PROCEDURE — 99284 EMERGENCY DEPT VISIT MOD MDM: CPT | Mod: 25

## 2023-09-06 PROCEDURE — 85025 COMPLETE CBC W/AUTO DIFF WBC: CPT | Performed by: FAMILY MEDICINE

## 2023-09-06 PROCEDURE — 96365 THER/PROPH/DIAG IV INF INIT: CPT

## 2023-09-06 PROCEDURE — 87186 SC STD MICRODIL/AGAR DIL: CPT | Performed by: EMERGENCY MEDICINE

## 2023-09-06 PROCEDURE — 36415 COLL VENOUS BLD VENIPUNCTURE: CPT | Performed by: FAMILY MEDICINE

## 2023-09-06 PROCEDURE — 87637 SARSCOV2&INF A&B&RSV AMP PRB: CPT | Performed by: EMERGENCY MEDICINE

## 2023-09-06 PROCEDURE — 81001 URINALYSIS AUTO W/SCOPE: CPT | Performed by: EMERGENCY MEDICINE

## 2023-09-06 RX ORDER — PHENAZOPYRIDINE HYDROCHLORIDE 200 MG/1
200 TABLET, FILM COATED ORAL 3 TIMES DAILY
Qty: 9 TABLET | Refills: 0 | Status: SHIPPED | OUTPATIENT
Start: 2023-09-06 | End: 2023-09-09

## 2023-09-06 RX ORDER — CEFTRIAXONE 1 G/1
1 INJECTION, POWDER, FOR SOLUTION INTRAMUSCULAR; INTRAVENOUS ONCE
Status: COMPLETED | OUTPATIENT
Start: 2023-09-06 | End: 2023-09-06

## 2023-09-06 RX ORDER — CEFDINIR 300 MG/1
300 CAPSULE ORAL 2 TIMES DAILY
Qty: 20 CAPSULE | Refills: 0 | Status: SHIPPED | OUTPATIENT
Start: 2023-09-07 | End: 2023-09-17

## 2023-09-06 RX ADMIN — CEFTRIAXONE SODIUM 1 G: 1 INJECTION, POWDER, FOR SOLUTION INTRAMUSCULAR; INTRAVENOUS at 16:38

## 2023-09-06 ASSESSMENT — ENCOUNTER SYMPTOMS
CHILLS: 1
DYSURIA: 0
HEMATURIA: 0
BACK PAIN: 0
NAUSEA: 0
VOMITING: 0
ABDOMINAL PAIN: 1

## 2023-09-06 NOTE — ED PROVIDER NOTES
EMERGENCY DEPARTMENT ENCOUNTER      NAME: Desi Vega  AGE: 73 year old female  YOB: 1950  MRN: [id number]  EVALUATION DATE & TIME: No admission date for patient encounter.    PCP: Roz Acevedo    ED PROVIDER: Alex Babcock M.D.    Chief Complaint   Patient presents with    Chills       FINAL IMPRESSION:  1. Acute lower UTI        ED COURSE & MEDICAL DECISION MAKING:    Pertinent Labs & Imaging studies independently interpreted by me. (See chart for details)  4:18 PM Patient seen and examined, reviewed recent coronary CTA which showed pacemaker but no significant plaque or stenosis in the coronary tree.  Patient presents today with chills and shaking rigors going on for the last 5 days, also urinary frequency and occasional lower abdominal pain.  Urinalysis done prior to initial evaluation is consistent with urinary infection, however shaking rigors are concerning for more systemic process, labs including blood cultures are ordered.  Rocephin IV given in the emergency department.  5:16 PM labs independently interpreted by me are reassuring with normal white blood cell count, reassuring kidney function, COVID-negative.  Patient is stable for discharge on Omnicef.  Symptoms CT scan of the abdomen pelvis but no CVA tenderness to suggest infected kidney stone and no lower abdominal tenderness to suggest diverticulitis, acute appendicitis.    At the conclusion of the encounter I discussed the results of all of the tests and the disposition. The questions were answered. The patient or family acknowledged understanding and was agreeable with the care plan.     Medical Decision Making    History:  Supplemental history from: Documented in chart, if applicable  External Record(s) reviewed: Documented in chart, if applicable.    Work Up:  Chart documentation includes differential considered and any EKGs or imaging independently interpreted by provider, where specified.  In additional to  work up documented, I considered the following work up: Documented in chart, if applicable.    External consultation:  Discussion of management with another provider: Documented in chart, if applicable    Complicating factors:  Care impacted by chronic illness: N/A  Care affected by social determinants of health: N/A    Disposition considerations: Discharge. I prescribed additional prescription strength medication(s) as charted. I considered admission, but ultimately discharged patient after reassuring emergency department evaluation.        MEDICATIONS GIVEN IN THE EMERGENCY:  Medications   cefTRIAXone (ROCEPHIN) 1 g vial to attach to  mL bag for ADULTS or NS 50 mL bag for PEDS (0 g Intravenous Stopped 9/6/23 1714)       NEW PRESCRIPTIONS STARTED AT TODAY'S ER VISIT  New Prescriptions    CEFDINIR (OMNICEF) 300 MG CAPSULE    Take 1 capsule (300 mg) by mouth 2 times daily for 10 days    PHENAZOPYRIDINE (PYRIDIUM) 200 MG TABLET    Take 1 tablet (200 mg) by mouth 3 times daily for 3 days       =================================================================    HPI    Patient information was obtained from: patient      Desi Vega is a 73 year old female with a pertinent history of TGA, sick sinus syndrome, PE, bradycardia, and hyperlipidemia who presents to this ED via walk-in for evaluation of chills. The patient got her flu shot 9/1/23 but a couple days ago she started feeling chills. The chills cause her to clench her body which then gives her lower abdominal pain. It lasts for an hour and comes back another time. When she urinates, she does not feel it is empty. Her temperature was also 100.7. The patient has a pace maker for bradycardia. Denies nausea, vomiting, back pain, dysuria, hematuria, other medical problems, or any other complaints at this time.       REVIEW OF SYSTEMS   Review of Systems   Constitutional:  Positive for chills.   Gastrointestinal:  Positive for abdominal pain (Lower).  Negative for nausea and vomiting.   Genitourinary:  Negative for dysuria and hematuria.   Musculoskeletal:  Negative for back pain.      All other systems reviewed and negative    PAST MEDICAL HISTORY:  Past Medical History:   Diagnosis Date    Anhedonia     Arthritis     Diarrhea     thought to be due to gastric bypass    Grief reaction     History of palpitations     Hyperlipidemia     Insomnia     Osteopenia     Osteopenia     Shortness of breath     Sick sinus syndrome (H)     bradycardia - pacemaker placed    TGA (transient global amnesia) 2004    Transient global amnesia - on plavix    Tinnitus     Urinary frequency     with some leakage       PAST SURGICAL HISTORY:  Past Surgical History:   Procedure Laterality Date    ANKLE SURGERY      CHOLECYSTECTOMY      GASTRIC BYPASS      HC CORRECT BUNION,DOUBLE OSTEOTOMY      Description: Hallux Valgus (Bunion) Correction;  Proc Date: 01/01/2004;    HC REMOVAL GALLBLADDER      Description: Cholecystectomy;  Proc Date: 01/01/1987;    HEMORRHOIDECTOMY INTERNAL LIGATION      Description: Hemorrhoidectomy;  Proc Date: 01/01/2012;    HYSTERECTOMY  1988    IMPLANT PACEMAKER  1999    JOINT REPLACEMENT Left     MAMMOPLASTY REDUCTION Bilateral 1997    CT LAP,URETHRAL SUSPENSION      Description: Laparoscopic Urethral Suspension For Stress Incontinence;  Proc Date: 01/01/2002;    TOTAL KNEE ARTHROPLASTY Right 9/10/2015    Done by Dr. Gutierrez, [facility]    [facility] REPAIR OF RECTOCELE      Description: Rectocele Repair;  Proc Date: 01/01/2002;    [facility] TOTAL ABDOM HYSTERECTOMY      Description: Hysterectomy;  Proc Date: 01/01/1988;  Comments: for menorrhagia, still has ovaries    [facility] OPEN TREATMENT PROXIMAL FIBULA/SHAFT FRACTURE      Description: Open Treatment Of Fracture Of Fibular Shaft;  Recorded: 06/11/2013;       CURRENT MEDICATIONS:    No current facility-administered medications for this encounter.     Current Outpatient Medications   Medication    [START ON 9/7/2023]  cefdinir (OMNICEF) 300 MG capsule    phenazopyridine (PYRIDIUM) 200 MG tablet    biotin 2.5 MG CAPS    CALCIUM PO    losartan (COZAAR) 25 MG tablet    Lutein 20 MG CAPS    Multiple Vitamins-Minerals (ONCOVITE) TABS    triamcinolone (KENALOG) 0.5 % external ointment    vitamin D3 (CHOLECALCIFEROL) 125 MCG (5000 UT) tablet    Zinc 20 MG CAPS       ALLERGIES:  Allergies   Allergen Reactions    Aspirin Swelling    Bee Venom Swelling     Throat swelling, hands swelling    Fire Ant        FAMILY HISTORY:  Family History   Problem Relation Age of Onset    Coronary Artery Disease Father     Diabetes Maternal Grandmother     Diabetes Brother        SOCIAL HISTORY:   Social History     Socioeconomic History    Marital status:    Tobacco Use    Smoking status: Never    Smokeless tobacco: Never     Social Determinants of Health     Financial Resource Strain: Low Risk  (11/8/2021)    Overall Financial Resource Strain (CARDIA)     Difficulty of Paying Living Expenses: Not hard at all   Food Insecurity: No Food Insecurity (11/8/2021)    Hunger Vital Sign     Worried About Running Out of Food in the Last Year: Never true     Ran Out of Food in the Last Year: Never true   Transportation Needs: No Transportation Needs (11/8/2021)    PRAPARE - Transportation     Lack of Transportation (Medical): No     Lack of Transportation (Non-Medical): No   Physical Activity: Inactive (11/8/2021)    Exercise Vital Sign     Days of Exercise per Week: 0 days     Minutes of Exercise per Session: 0 min   Stress: No Stress Concern Present (11/8/2021)    [employer] [place] [employer] - Occupational Stress Questionnaire     Feeling of Stress : Only a little   Social Connections: Moderately Isolated (11/8/2021)    Social Connection and Isolation Panel [NHANES]     Frequency of Communication with Friends and Family: Twice a week     Frequency of Social Gatherings with Friends and Family: Twice a week     Attends Confucianist Services: More  than 4 times per year     Active Member of Clubs or Organizations: No     Marital Status:    Housing Stability: Low Risk  (11/8/2021)    Housing Stability Vital Sign     Unable to Pay for Housing in the Last Year: No     Number of Places Lived in the Last Year: 1     Unstable Housing in the Last Year: No       VITALS:  /59   Pulse 91   Temp 99.4  F (37.4  C) (Oral)   Resp 18   Wt 59 kg (130 lb)   SpO2 95%   BMI 22.47 kg/m      PHYSICAL EXAM:  Physical Exam  Vitals (Normal exam) and nursing note reviewed.   Constitutional:       Appearance: Normal appearance.   HENT:      Head: Normocephalic and atraumatic.      Right Ear: External ear normal.      Left Ear: External ear normal.      Nose: Nose normal.      Mouth/Throat:      Mouth: Mucous membranes are moist.   Eyes:      Extraocular Movements: Extraocular movements intact.      Conjunctiva/sclera: Conjunctivae normal.      Pupils: Pupils are equal, round, and reactive to light.   Cardiovascular:      Rate and Rhythm: Normal rate and regular rhythm.   Pulmonary:      Effort: Pulmonary effort is normal.      Breath sounds: Normal breath sounds. No wheezing or rales.   Abdominal:      General: Abdomen is flat. There is no distension.      Palpations: Abdomen is soft.      Tenderness: There is no abdominal tenderness. There is no guarding.   Musculoskeletal:         General: Normal range of motion.      Cervical back: Normal range of motion and neck supple.      Right lower leg: No edema.      Left lower leg: No edema.   Lymphadenopathy:      Cervical: No cervical adenopathy.   Skin:     General: Skin is warm and dry.   Neurological:      General: No focal deficit present.      Mental Status: She is alert and oriented to person, place, and time. Mental status is at baseline.      Comments: No gross focal neurologic deficits   Psychiatric:         Mood and Affect: Mood normal.         Behavior: Behavior normal.         Thought Content: Thought content  normal.          LAB:  All pertinent labs reviewed and interpreted.  Results for orders placed or performed during the hospital encounter of 09/06/23   Symptomatic Influenza A/B, RSV, & SARS-CoV2 PCR (COVID-19) Nasopharyngeal    Specimen: Nasopharyngeal; Swab   Result Value Ref Range    Influenza A PCR Negative Negative    Influenza B PCR Negative Negative    RSV PCR Negative Negative    SARS CoV2 PCR Negative Negative   UA with Microscopic reflex to Culture    Specimen: Urine, Midstream   Result Value Ref Range    Color Urine Yellow Colorless, Straw, Light Yellow, Yellow    Appearance Urine Turbid (A) Clear    Glucose Urine Negative Negative mg/dL    Bilirubin Urine Negative Negative    Ketones Urine Negative Negative mg/dL    Specific Gravity Urine 1.021 1.001 - 1.030    Blood Urine 0.1 mg/dL (A) Negative    pH Urine 6.0 5.0 - 7.0    Protein Albumin Urine 100 (A) Negative mg/dL    Urobilinogen Urine <2.0 <2.0 mg/dL    Nitrite Urine Positive (A) Negative    Leukocyte Esterase Urine 500 Yaritza/uL (A) Negative    Bacteria Urine Few (A) None Seen /HPF    WBC Clumps Urine Present (A) None Seen /HPF    Mucus Urine Present (A) None Seen /LPF    RBC Urine 0 <=2 /HPF    WBC Urine >182 (H) <=5 /HPF    Squamous Epithelials Urine <1 <=1 /HPF   Basic metabolic panel   Result Value Ref Range    Sodium 135 (L) 136 - 145 mmol/L    Potassium 3.9 3.4 - 5.3 mmol/L    Chloride 101 98 - 107 mmol/L    Carbon Dioxide (CO2) 25 22 - 29 mmol/L    Anion Gap 9 7 - 15 mmol/L    Urea Nitrogen 13.9 8.0 - 23.0 mg/dL    Creatinine 0.91 0.51 - 0.95 mg/dL    Calcium 9.0 8.8 - 10.2 mg/dL    Glucose 146 (H) 70 - 99 mg/dL    GFR Estimate 65 >60 mL/min/1.73m2   CBC with platelets and differential   Result Value Ref Range    WBC Count 9.5 4.0 - 11.0 10e3/uL    RBC Count 4.00 3.80 - 5.20 10e6/uL    Hemoglobin 12.8 11.7 - 15.7 g/dL    Hematocrit 39.2 35.0 - 47.0 %    MCV 98 78 - 100 fL    MCH 32.0 26.5 - 33.0 pg    MCHC 32.7 31.5 - 36.5 g/dL    RDW 13.9 10.0  - 15.0 %    Platelet Count 169 150 - 450 10e3/uL    % Neutrophils 90 %    % Lymphocytes 4 %    % Monocytes 6 %    % Eosinophils 0 %    % Basophils 0 %    % Immature Granulocytes 0 %    NRBCs per 100 WBC 0 <1 /100    Absolute Neutrophils 8.6 (H) 1.6 - 8.3 10e3/uL    Absolute Lymphocytes 0.4 (L) 0.8 - 5.3 10e3/uL    Absolute Monocytes 0.6 0.0 - 1.3 10e3/uL    Absolute Eosinophils 0.0 0.0 - 0.7 10e3/uL    Absolute Basophils 0.0 0.0 - 0.2 10e3/uL    Absolute Immature Granulocytes 0.0 <=0.4 10e3/uL    Absolute NRBCs 0.0 10e3/uL       RADIOLOGY:  Reviewed all pertinent imaging. Please see official radiology report.  No orders to display       I, Rodney Mata, am serving as a scribe to document services personally performed by Dr. Babcock based on my observation and the provider's statements to me. I, Alex Babcock MD attest that Rodney Mata is acting in a scribe capacity, has observed my performance of the services and has documented them in accordance with my direction.    Alex Babcock M.D.  Emergency Medicine  [facility] EMERGENCY DEPARTMENT  [address] [facility] [address] [phone]  Dept: [phone]       Alex Babcock MD  09/06/23 3411

## 2023-09-06 NOTE — ED TRIAGE NOTES
The pt arrives with c/o chills on and off for the past five days after receiving a flu shot. She states that while she shaking her lower abd hurts as well, no other symptoms. Called clinic and they told her to come tot he ER.      Triage Assessment       Row Name 09/06/23 7229       Triage Assessment (Adult)    Airway WDL WDL       Respiratory WDL    Respiratory WDL WDL       Skin Circulation/Temperature WDL    Skin Circulation/Temperature WDL WDL       Peripheral/Neurovascular WDL    Peripheral Neurovascular WDL WDL       Cognitive/Neuro/Behavioral WDL    Cognitive/Neuro/Behavioral WDL WDL

## 2023-09-06 NOTE — TELEPHONE ENCOUNTER
"Patient c/o lower ABD pain only occurs with shivering x 3 days. Patient states the pain is intermittent, comes on all of a sudden and lasts approx 3 hours. Patient rates the pain 4/10, describes the pain as cramping sensation, states the pain resolves after the chills go away.     Patient initially denied any fever, when asked if she checked her temperature she replied \"I used my hand and I did not feel warm\". After using a thermometer 100.2 degrees orally.     Patient reports normal BM yesterday medium in size, denies any flatulence. C/O urinary frequency and urgency only able to void small amounts x 3 days, denies any low back/flank pain, states urine is yellow in color, clear and without foul odor.     Advised patient to go into ED for further evaluation, patient and nephew agree with this plan and update sent to Dr. Roz Acevedo.    Julie Behrendt RN    "

## 2023-09-07 LAB — BACTERIA UR CULT: ABNORMAL

## 2023-09-08 ENCOUNTER — PATIENT OUTREACH (OUTPATIENT)
Dept: CARE COORDINATION | Facility: CLINIC | Age: 73
End: 2023-09-08
Payer: COMMERCIAL

## 2023-09-08 NOTE — LETTER
[facility] CARE COORDINATION  [address] [place] [address]    September 8, 2023    Desi Vega  [address] [facility] [address]      Dear Desi,        I am a  clinic community health worker who works with Roz Acevedo MD with the [facility]. I wanted to thank you for spending the time to talk with me.  Below is a description of clinic care coordination and how we can further assist you.       The clinic care coordination team is made up of a registered nurse, , financial resource worker and community health worker who understand the health care system. The goal of clinic care coordination is to help you manage your health and improve access to the health care system. Our team works alongside your provider to assist you in determining your health and social needs. We can help you obtain health care and community resources, providing you with necessary information and education. We can work with you through any barriers and develop a care plan that helps coordinate and strengthen the communication between you and your care team.  Our services are voluntary and are offered without charge to you personally.    If you wish to connect with the Clinic Care Coordination Team, please let your [facility] Primary Care Provider or Clinic Care Team know and they can place a referral. The Clinic Care Coordination team will then reach out by phone to further support you.    We are focused on providing you with the highest-quality healthcare experience possible.    Sincerely,   Your care team with [facility]

## 2023-09-08 NOTE — PROGRESS NOTES
Clinic Care Coordination Contact  Community Health Worker Initial Outreach    CHW Initial Information Gathering:  Referral Source: ED Follow-Up  Preferred Hospital: [facility]  [phone]  Preferred Urgent Care: [facility], [phone]  No PCP office visit in Past Year: No       Patient accepts CC: No, due to the patient stating that she was not needing assistance from CCC at this time. Patient will be sent Care Coordination introduction letter for future reference.     DAX Vanessa  [phone]  Connected Care Resource [facility]

## 2023-09-18 ENCOUNTER — PATIENT OUTREACH (OUTPATIENT)
Dept: CARE COORDINATION | Facility: CLINIC | Age: 73
End: 2023-09-18
Payer: COMMERCIAL

## 2023-11-09 ENCOUNTER — ANCILLARY PROCEDURE (OUTPATIENT)
Dept: CARDIOLOGY | Facility: CLINIC | Age: 73
End: 2023-11-09
Attending: INTERNAL MEDICINE
Payer: COMMERCIAL

## 2023-11-09 DIAGNOSIS — I49.5 SICK SINUS SYNDROME (H): ICD-10-CM

## 2023-11-09 DIAGNOSIS — Z95.0 CARDIAC PACEMAKER IN SITU: Primary | ICD-10-CM

## 2023-11-09 LAB
MDC_IDC_LEAD_CONNECTION_STATUS: NORMAL
MDC_IDC_LEAD_CONNECTION_STATUS: NORMAL
MDC_IDC_LEAD_IMPLANT_DT: NORMAL
MDC_IDC_LEAD_IMPLANT_DT: NORMAL
MDC_IDC_LEAD_LOCATION: NORMAL
MDC_IDC_LEAD_LOCATION: NORMAL
MDC_IDC_LEAD_MFG: NORMAL
MDC_IDC_LEAD_MFG: NORMAL
MDC_IDC_LEAD_MODEL: NORMAL
MDC_IDC_LEAD_MODEL: NORMAL
MDC_IDC_LEAD_POLARITY_TYPE: NORMAL
MDC_IDC_LEAD_POLARITY_TYPE: NORMAL
MDC_IDC_LEAD_SERIAL: NORMAL
MDC_IDC_LEAD_SERIAL: NORMAL
MDC_IDC_LEAD_SPECIAL_FUNCTION: NORMAL
MDC_IDC_LEAD_SPECIAL_FUNCTION: NORMAL
MDC_IDC_MSMT_BATTERY_DTM: NORMAL
MDC_IDC_MSMT_BATTERY_IMPEDANCE: 3058 OHM
MDC_IDC_MSMT_BATTERY_REMAINING_LONGEVITY: 19 MO
MDC_IDC_MSMT_BATTERY_STATUS: NORMAL
MDC_IDC_MSMT_BATTERY_VOLTAGE: 2.7 V
MDC_IDC_MSMT_LEADCHNL_RA_IMPEDANCE_VALUE: 721 OHM
MDC_IDC_MSMT_LEADCHNL_RA_PACING_THRESHOLD_AMPLITUDE: 0.75 V
MDC_IDC_MSMT_LEADCHNL_RA_PACING_THRESHOLD_PULSEWIDTH: 0.4 MS
MDC_IDC_MSMT_LEADCHNL_RV_IMPEDANCE_VALUE: 580 OHM
MDC_IDC_PG_IMPLANT_DTM: NORMAL
MDC_IDC_PG_MFG: NORMAL
MDC_IDC_PG_MODEL: NORMAL
MDC_IDC_PG_SERIAL: NORMAL
MDC_IDC_PG_TYPE: NORMAL
MDC_IDC_SESS_CLINIC_NAME: NORMAL
MDC_IDC_SESS_DTM: NORMAL
MDC_IDC_SESS_TYPE: NORMAL
MDC_IDC_SET_BRADY_AT_MODE_SWITCH_MODE: NORMAL
MDC_IDC_SET_BRADY_AT_MODE_SWITCH_RATE: 175 {BEATS}/MIN
MDC_IDC_SET_BRADY_LOWRATE: 60 {BEATS}/MIN
MDC_IDC_SET_BRADY_MAX_SENSOR_RATE: 130 {BEATS}/MIN
MDC_IDC_SET_BRADY_MAX_TRACKING_RATE: 130 {BEATS}/MIN
MDC_IDC_SET_BRADY_MODE: NORMAL
MDC_IDC_SET_BRADY_PAV_DELAY_LOW: 150 MS
MDC_IDC_SET_BRADY_SAV_DELAY_LOW: 120 MS
MDC_IDC_SET_LEADCHNL_RA_PACING_AMPLITUDE: 1.5 V
MDC_IDC_SET_LEADCHNL_RA_PACING_CAPTURE_MODE: NORMAL
MDC_IDC_SET_LEADCHNL_RA_PACING_POLARITY: NORMAL
MDC_IDC_SET_LEADCHNL_RA_PACING_PULSEWIDTH: 0.4 MS
MDC_IDC_SET_LEADCHNL_RA_SENSING_POLARITY: NORMAL
MDC_IDC_SET_LEADCHNL_RA_SENSING_SENSITIVITY: 0.5 MV
MDC_IDC_SET_LEADCHNL_RV_PACING_AMPLITUDE: 3 V
MDC_IDC_SET_LEADCHNL_RV_PACING_CAPTURE_MODE: NORMAL
MDC_IDC_SET_LEADCHNL_RV_PACING_POLARITY: NORMAL
MDC_IDC_SET_LEADCHNL_RV_PACING_PULSEWIDTH: 1 MS
MDC_IDC_SET_LEADCHNL_RV_SENSING_POLARITY: NORMAL
MDC_IDC_SET_LEADCHNL_RV_SENSING_SENSITIVITY: 5.6 MV
MDC_IDC_SET_ZONE_DETECTION_INTERVAL: 333.33 MS
MDC_IDC_SET_ZONE_DETECTION_INTERVAL: 342.86 MS
MDC_IDC_SET_ZONE_STATUS: NORMAL
MDC_IDC_SET_ZONE_STATUS: NORMAL
MDC_IDC_SET_ZONE_TYPE: NORMAL
MDC_IDC_SET_ZONE_TYPE: NORMAL
MDC_IDC_SET_ZONE_VENDOR_TYPE: NORMAL
MDC_IDC_SET_ZONE_VENDOR_TYPE: NORMAL
MDC_IDC_STAT_AT_BURDEN_PERCENT: 0 %
MDC_IDC_STAT_AT_DTM_END: NORMAL
MDC_IDC_STAT_AT_DTM_START: NORMAL
MDC_IDC_STAT_AT_MODE_SW_COUNT: 1
MDC_IDC_STAT_BRADY_AP_VP_PERCENT: 0 %
MDC_IDC_STAT_BRADY_AP_VS_PERCENT: 78 %
MDC_IDC_STAT_BRADY_AS_VP_PERCENT: 0 %
MDC_IDC_STAT_BRADY_AS_VS_PERCENT: 22 %
MDC_IDC_STAT_BRADY_DTM_END: NORMAL
MDC_IDC_STAT_BRADY_DTM_START: NORMAL
MDC_IDC_STAT_EPISODE_RECENT_COUNT: 0
MDC_IDC_STAT_EPISODE_RECENT_COUNT: 0
MDC_IDC_STAT_EPISODE_RECENT_COUNT_DTM_END: NORMAL
MDC_IDC_STAT_EPISODE_RECENT_COUNT_DTM_END: NORMAL
MDC_IDC_STAT_EPISODE_RECENT_COUNT_DTM_START: NORMAL
MDC_IDC_STAT_EPISODE_RECENT_COUNT_DTM_START: NORMAL
MDC_IDC_STAT_EPISODE_TYPE: NORMAL
MDC_IDC_STAT_EPISODE_TYPE: NORMAL

## 2023-11-09 PROCEDURE — 93294 REM INTERROG EVL PM/LDLS PM: CPT | Performed by: INTERNAL MEDICINE

## 2023-11-09 PROCEDURE — 93296 REM INTERROG EVL PM/IDS: CPT | Performed by: INTERNAL MEDICINE

## 2023-11-28 ENCOUNTER — OFFICE VISIT (OUTPATIENT)
Dept: CARDIOLOGY | Facility: CLINIC | Age: 73
End: 2023-11-28
Attending: INTERNAL MEDICINE
Payer: COMMERCIAL

## 2023-11-28 VITALS
DIASTOLIC BLOOD PRESSURE: 70 MMHG | BODY MASS INDEX: 22.64 KG/M2 | RESPIRATION RATE: 14 BRPM | WEIGHT: 131 LBS | SYSTOLIC BLOOD PRESSURE: 110 MMHG | HEART RATE: 80 BPM

## 2023-11-28 DIAGNOSIS — Z95.0 CARDIAC PACEMAKER IN SITU: ICD-10-CM

## 2023-11-28 DIAGNOSIS — I10 ESSENTIAL HYPERTENSION: ICD-10-CM

## 2023-11-28 DIAGNOSIS — I49.5 SICK SINUS SYNDROME (H): ICD-10-CM

## 2023-11-28 DIAGNOSIS — Z95.0 CARDIAC PACEMAKER IN SITU: Primary | ICD-10-CM

## 2023-11-28 DIAGNOSIS — I42.8 NON-ISCHEMIC CARDIOMYOPATHY (H): Primary | ICD-10-CM

## 2023-11-28 LAB
MDC_IDC_LEAD_CONNECTION_STATUS: NORMAL
MDC_IDC_LEAD_CONNECTION_STATUS: NORMAL
MDC_IDC_LEAD_IMPLANT_DT: NORMAL
MDC_IDC_LEAD_IMPLANT_DT: NORMAL
MDC_IDC_LEAD_LOCATION: NORMAL
MDC_IDC_LEAD_LOCATION: NORMAL
MDC_IDC_LEAD_MFG: NORMAL
MDC_IDC_LEAD_MFG: NORMAL
MDC_IDC_LEAD_MODEL: NORMAL
MDC_IDC_LEAD_MODEL: NORMAL
MDC_IDC_LEAD_POLARITY_TYPE: NORMAL
MDC_IDC_LEAD_POLARITY_TYPE: NORMAL
MDC_IDC_LEAD_SERIAL: NORMAL
MDC_IDC_LEAD_SERIAL: NORMAL
MDC_IDC_LEAD_SPECIAL_FUNCTION: NORMAL
MDC_IDC_LEAD_SPECIAL_FUNCTION: NORMAL
MDC_IDC_MSMT_BATTERY_DTM: NORMAL
MDC_IDC_MSMT_BATTERY_IMPEDANCE: 2957 OHM
MDC_IDC_MSMT_BATTERY_REMAINING_LONGEVITY: 22 MO
MDC_IDC_MSMT_BATTERY_STATUS: NORMAL
MDC_IDC_MSMT_BATTERY_VOLTAGE: 2.69 V
MDC_IDC_MSMT_LEADCHNL_RA_IMPEDANCE_VALUE: 860 OHM
MDC_IDC_MSMT_LEADCHNL_RA_PACING_THRESHOLD_AMPLITUDE: 0.5 V
MDC_IDC_MSMT_LEADCHNL_RA_PACING_THRESHOLD_AMPLITUDE: 0.75 V
MDC_IDC_MSMT_LEADCHNL_RA_PACING_THRESHOLD_AMPLITUDE: 0.75 V
MDC_IDC_MSMT_LEADCHNL_RA_PACING_THRESHOLD_PULSEWIDTH: 0.4 MS
MDC_IDC_MSMT_LEADCHNL_RA_SENSING_INTR_AMPL: 2 MV
MDC_IDC_MSMT_LEADCHNL_RV_IMPEDANCE_VALUE: 680 OHM
MDC_IDC_MSMT_LEADCHNL_RV_PACING_THRESHOLD_AMPLITUDE: 1.5 V
MDC_IDC_MSMT_LEADCHNL_RV_PACING_THRESHOLD_PULSEWIDTH: 0.4 MS
MDC_IDC_MSMT_LEADCHNL_RV_PACING_THRESHOLD_PULSEWIDTH: 1 MS
MDC_IDC_MSMT_LEADCHNL_RV_PACING_THRESHOLD_PULSEWIDTH: 1 MS
MDC_IDC_MSMT_LEADCHNL_RV_SENSING_INTR_AMPL: 15.67 MV
MDC_IDC_MSMT_LEADCHNL_RV_SENSING_INTR_AMPL: 15.67 MV
MDC_IDC_MSMT_LEADCHNL_RV_SENSING_INTR_AMPL: 16 MV
MDC_IDC_PG_IMPLANT_DTM: NORMAL
MDC_IDC_PG_MFG: NORMAL
MDC_IDC_PG_MODEL: NORMAL
MDC_IDC_PG_SERIAL: NORMAL
MDC_IDC_PG_TYPE: NORMAL
MDC_IDC_SESS_CLINIC_NAME: NORMAL
MDC_IDC_SESS_DTM: NORMAL
MDC_IDC_SESS_TYPE: NORMAL
MDC_IDC_SET_BRADY_AT_MODE_SWITCH_MODE: NORMAL
MDC_IDC_SET_BRADY_AT_MODE_SWITCH_RATE: 175 {BEATS}/MIN
MDC_IDC_SET_BRADY_LOWRATE: 60 {BEATS}/MIN
MDC_IDC_SET_BRADY_MAX_SENSOR_RATE: 130 {BEATS}/MIN
MDC_IDC_SET_BRADY_MAX_TRACKING_RATE: 130 {BEATS}/MIN
MDC_IDC_SET_BRADY_MODE: NORMAL
MDC_IDC_SET_BRADY_PAV_DELAY_LOW: 150 MS
MDC_IDC_SET_BRADY_SAV_DELAY_LOW: 120 MS
MDC_IDC_SET_LEADCHNL_RA_PACING_AMPLITUDE: 1.5 V
MDC_IDC_SET_LEADCHNL_RA_PACING_CAPTURE_MODE: NORMAL
MDC_IDC_SET_LEADCHNL_RA_PACING_POLARITY: NORMAL
MDC_IDC_SET_LEADCHNL_RA_PACING_PULSEWIDTH: 0.4 MS
MDC_IDC_SET_LEADCHNL_RA_SENSING_POLARITY: NORMAL
MDC_IDC_SET_LEADCHNL_RA_SENSING_SENSITIVITY: 0.5 MV
MDC_IDC_SET_LEADCHNL_RV_PACING_AMPLITUDE: 3 V
MDC_IDC_SET_LEADCHNL_RV_PACING_CAPTURE_MODE: NORMAL
MDC_IDC_SET_LEADCHNL_RV_PACING_POLARITY: NORMAL
MDC_IDC_SET_LEADCHNL_RV_PACING_PULSEWIDTH: 1 MS
MDC_IDC_SET_LEADCHNL_RV_SENSING_POLARITY: NORMAL
MDC_IDC_SET_LEADCHNL_RV_SENSING_SENSITIVITY: 5.6 MV
MDC_IDC_SET_ZONE_DETECTION_INTERVAL: 333.33 MS
MDC_IDC_SET_ZONE_DETECTION_INTERVAL: 342.86 MS
MDC_IDC_SET_ZONE_STATUS: NORMAL
MDC_IDC_SET_ZONE_STATUS: NORMAL
MDC_IDC_SET_ZONE_TYPE: NORMAL
MDC_IDC_SET_ZONE_TYPE: NORMAL
MDC_IDC_SET_ZONE_VENDOR_TYPE: NORMAL
MDC_IDC_SET_ZONE_VENDOR_TYPE: NORMAL
MDC_IDC_STAT_AT_BURDEN_PERCENT: 0 %
MDC_IDC_STAT_AT_DTM_END: NORMAL
MDC_IDC_STAT_AT_DTM_START: NORMAL
MDC_IDC_STAT_AT_MODE_SW_COUNT: 1
MDC_IDC_STAT_BRADY_AP_VP_PERCENT: 0 %
MDC_IDC_STAT_BRADY_AP_VS_PERCENT: 78 %
MDC_IDC_STAT_BRADY_AS_VP_PERCENT: 0 %
MDC_IDC_STAT_BRADY_AS_VS_PERCENT: 22 %
MDC_IDC_STAT_BRADY_DTM_END: NORMAL
MDC_IDC_STAT_BRADY_DTM_START: NORMAL
MDC_IDC_STAT_EPISODE_RECENT_COUNT: 0
MDC_IDC_STAT_EPISODE_RECENT_COUNT: 0
MDC_IDC_STAT_EPISODE_RECENT_COUNT_DTM_END: NORMAL
MDC_IDC_STAT_EPISODE_RECENT_COUNT_DTM_END: NORMAL
MDC_IDC_STAT_EPISODE_RECENT_COUNT_DTM_START: NORMAL
MDC_IDC_STAT_EPISODE_RECENT_COUNT_DTM_START: NORMAL
MDC_IDC_STAT_EPISODE_TYPE: NORMAL
MDC_IDC_STAT_EPISODE_TYPE: NORMAL

## 2023-11-28 PROCEDURE — 93280 PM DEVICE PROGR EVAL DUAL: CPT | Performed by: INTERNAL MEDICINE

## 2023-11-28 PROCEDURE — 99214 OFFICE O/P EST MOD 30 MIN: CPT | Performed by: GENERAL ACUTE CARE HOSPITAL

## 2023-11-28 RX ORDER — KETOCONAZOLE 20 MG/G
CREAM TOPICAL DAILY
COMMUNITY
Start: 2023-11-27

## 2023-11-28 NOTE — PROGRESS NOTES
HEART CARE ENCOUNTER NOTE        Assessment/Recommendations   Assessment:    Sick sinus syndrome status post Medtronic dual-chamber permanent pacemaker placement 5/18/1999 with a generator replacement 12/7/2011. Normal device function noted today.  Nonischemic cardiomyopathy with an overall stable left ventricular ejection fraction ranging mostly 40-50% over the past several years. Unclear etiology. No signs or symptoms of congestive heart failure.  Essential hypertension. Controlled.  History of provoked bilateral pulmonary emboli 9/22/2015 following right total knee arthroplasty.    Plan:  Continue losartan 25 mg daily.  We discussed beta blocker therapy for her cardiomyopathy. As his left ventricular ejection fraction has likely been stable and mostly above 40% without symptoms, we will forgo beta blocker therapy at this time.  We can repeat a transthoracic echocardiogram in the next couple years, or sooner if she develops new symptoms.  Follows in device clinic.  Follow-up with me in 1 year.         History of Present Illness   Ms. Desi Vega is a 73 year old female with a significant past history of SSS s/p Medtronic dual-chamber PPM placement 5/18/1999 with a generator replacement 12/7/2011, provoked bilateral PE 9/22/2015 following right total knee replacement, nonischemic cardiomyopathy, and HTN presenting to establish care in general cardiology. She has previously seen my cardiology colleague Dr. Orlin Wells.    Overall she feels well. She does have dyspnea on exertion only when carrying something up the stairs but this is unchanged for the past several years. No chest pain/pressure/tightness, shortness of breath at rest, light headedness/dizziness, pre-syncope, syncope, lower extremity swelling, palpitations, paroxysmal nocturnal dyspnea (PND), or orthopnea.    Her left ventricular ejection fraction has been noted to be reduced since at least 2015, ranging mostly 40-50%. Her most recent TTE  6/12/2023 suggested her LVEF to be slightly lower at 35-40% although a follow-up pharmacologic nuclear stress test 7/11/2023 showed her LVEF to be 56%. Coronary CTA 8/1/2023 showed normal coronary arteries.     Cardiac Problems and Cardiac Diagnostics     Most Recent Cardiac testing:  ECG dated 7/7/2016 (personaly reviewed and interpreted): SR, normal ECG    Holter monitor 1/27/2016 (report reviewed):  INTERPRETATION:  01/28/2016     INTERPRETATION:  Predominant rhythm is an atrial paced rhythm.  The patient has a normally functioning dual-chamber pacemaker.  Rare ventricular pacing with fusion is noted.  Rates ranged from 60 beats per minute to 140 beats per minute.  Rare atrial premature beats were noted, 12 over 24 hours.  Occasional ventricular premature beats were noted, 241 over 24 hours.  The patient noted shortness of breath associated with sinus rhythm, one at a rate of 117 beats per minute and the second at a rate of 70 beats per minute.     CONCLUSION:  Normally functioning dual-chamber pacemaker with predominant rhythm an atrial paced rhythm, otherwise normal Holter.    Device interrogation 11/28/2023 (report reviewed):  Device: Hepregen, Adapta (D) PPM  Pacing %/Programmed: AP- 77.7%, - 0.1%, DDDR 60/130  Lead(s): Stable  Battery longevity: Estimating 23 months remaining.  Presenting rhythm: AS/VS @ 85 bpm  Underlying rhythm: SR @ 70's  Heart rates: Histograms show primarily 60-90 bpm.  Atrial arrhythmias: Since 2/6/2023- none  Anticoagulant: none  Ventricular arrhythmias: Since 2/6/2023- none  Comments: Normal device function. No changes made.     ECHO 6/12/2023 (report reviewed):   Left ventricular function is decreased. The ejection fraction is 35-40% (moderately reduced).  Normal right ventricle size and systolic function.  No hemodynamically significant valvular abnormalities on 2D or color flow imaging.    Stress test 7/11/2023 (report reviewed):     Lexiscan stress ECG negative for ischemia.     The nuclear stress test is abnormal.  There is a small area of fixed defect involving the mid to distal anterior to anterolateral wall suggestive of nontransmural infarction.  No ischemia.    The left ventricular ejection fraction at stress is 56% with mild hypokinesis of the distal anterolateral wall.    There is no prior study for comparison.    Abdominal ultrasound 12/2/2021 (report reviewed):  No aortoiliac aneurysm.     Abdominal ultrasound 111/26/2014 (report reviewed):  No aortoiliac aneurysm.     CTA chest 9/22/2015 (report reviewed):  Bilateral pulmonary emboli.     CT coronary angiogram 8/1/2023 (report reviewed):     Right coronary artery dominant.  No observed significant plaque or stenosis within the visualized coronary tree.    Pacemaker catheter in the right heart    Please see separate report radiology for additional findings.     Medications  Allergies   Current Outpatient Medications   Medication Sig Dispense Refill    biotin 2.5 MG CAPS Take 1 capsule by mouth daily       CALCIUM PO Take 1,200 mg by mouth daily      ketoconazole (NIZORAL) 2 % external cream Apply topically daily      losartan (COZAAR) 25 MG tablet TAKE 1 TABLET BY MOUTH  DAILY 100 tablet 2    Lutein 20 MG CAPS Take 20 mg by mouth daily       Multiple Vitamins-Minerals (ONCOVITE) TABS Take 1 tablet by mouth daily       triamcinolone (KENALOG) 0.5 % external ointment Use twice daily to dermatitis on arm 30 g 3    vitamin D3 (CHOLECALCIFEROL) 125 MCG (5000 UT) tablet Take 5,000 Units by mouth daily       Zinc 20 MG CAPS Take 1 capsule by mouth daily        Allergies   Allergen Reactions    Aspirin Swelling    Bee Venom Swelling     Throat swelling, hands swelling    Fire Ant         Physical Examination Review of Systems   /70 (BP Location: Left arm, Patient Position: Sitting, Cuff Size: Adult Regular)   Pulse 80   Resp 14   Wt 59.4 kg (131 lb)   BMI 22.64 kg/m    Body mass index is 22.64 kg/m .  Wt Readings from Last 3  Encounters:   11/28/23 59.4 kg (131 lb)   09/06/23 59 kg (130 lb)   02/10/23 59.4 kg (131 lb)       General Appearance:   Pleasant  female, appears  stated age. no acute distress, normal body habitus   ENT/Mouth: membranes moist, no apparent gingival bleeding.      EYES:  no scleral icterus, normal conjunctivae   Neck: no carotid bruits. No anterior cervical lymphadenopaty   Respiratory:   lungs are clear to auscultation, no rales or wheezing, equal chest wall expansion    Cardiovascular:   Regular rhythm, normal rate. Normal first and second heart sounds with no murmurs, rubs, or gallops; the carotid, radial and posterior tibial pulses are intact, Jugular venous pressure normal, no edema bilaterally    Abdomen/GI:  no organomegaly, masses, bruits, or tenderness; bowel sounds are present   Extremities: no cyanosis or clubbing   Skin: no xanthelasma, warm.    Heme/lymph/ Immunology No apparent bleeding noted.   Neurologic: Alert and oriented. normal gait, no tremors     Psychiatric: Pleasant, calm, appropriate affect.    A complete 10 system review of systems was performed and is negative except as mentioned in the HPI/subjective.         Past History   Past Medical History:   Past Medical History:   Diagnosis Date    Anhedonia     Arthritis     Diarrhea     thought to be due to gastric bypass    Grief reaction     History of palpitations     Hyperlipidemia     Insomnia     Osteopenia     Osteopenia     Shortness of breath     Sick sinus syndrome (H)     bradycardia - pacemaker placed    TGA (transient global amnesia) 2004    Transient global amnesia - on plavix    Tinnitus     Urinary frequency     with some leakage       Past Surgical History:   Past Surgical History:   Procedure Laterality Date    ANKLE SURGERY      CHOLECYSTECTOMY      GASTRIC BYPASS      HC CORRECT BUNION,DOUBLE OSTEOTOMY      Description: Hallux Valgus (Bunion) Correction;  Proc Date: 01/01/2004;    HC REMOVAL GALLBLADDER      Description:  Cholecystectomy;  Proc Date: 01/01/1987;    HEMORRHOIDECTOMY INTERNAL LIGATION      Description: Hemorrhoidectomy;  Proc Date: 01/01/2012;    HYSTERECTOMY  1988    IMPLANT PACEMAKER  1999    JOINT REPLACEMENT Left     MAMMOPLASTY REDUCTION Bilateral 1997    CO LAP,URETHRAL SUSPENSION      Description: Laparoscopic Urethral Suspension For Stress Incontinence;  Proc Date: 01/01/2002;    TOTAL KNEE ARTHROPLASTY Right 9/10/2015    Done by Dr. Gutierrez, [facility]    [facility] REPAIR OF RECTOCELE      Description: Rectocele Repair;  Proc Date: 01/01/2002;    [facility] TOTAL ABDOM HYSTERECTOMY      Description: Hysterectomy;  Proc Date: 01/01/1988;  Comments: for menorrhagia, still has ovaries    [facility] OPEN TREATMENT PROXIMAL FIBULA/SHAFT FRACTURE      Description: Open Treatment Of Fracture Of Fibular Shaft;  Recorded: 06/11/2013;       Family History:   Family History   Problem Relation Age of Onset    Coronary Artery Disease Father     Diabetes Maternal Grandmother     Diabetes Brother         Social History:   Social History     Socioeconomic History    Marital status:      Spouse name: Not on file    Number of children: Not on file    Years of education: Not on file    Highest education level: Not on file   Occupational History    Not on file   Tobacco Use    Smoking status: Never    Smokeless tobacco: Never   Substance and Sexual Activity    Alcohol use: Not on file    Drug use: Not on file    Sexual activity: Not on file   Other Topics Concern    Not on file   Social History Narrative    Not on file     Social Determinants of Health     Financial Resource Strain: Low Risk  (11/8/2021)    Overall Financial Resource Strain (CARDIA)     Difficulty of Paying Living Expenses: Not hard at all   Food Insecurity: No Food Insecurity (11/8/2021)    Hunger Vital Sign     Worried About Running Out of Food in the Last Year: Never true     Ran Out of Food in the Last Year: Never true   Transportation Needs: No Transportation  Needs (11/8/2021)    PRAPARE - Transportation     Lack of Transportation (Medical): No     Lack of Transportation (Non-Medical): No   Physical Activity: Inactive (11/8/2021)    Exercise Vital Sign     Days of Exercise per Week: 0 days     Minutes of Exercise per Session: 0 min   Stress: No Stress Concern Present (11/8/2021)    [employer] - Occupational Stress Questionnaire     Feeling of Stress : Only a little   Social Connections: Moderately Isolated (11/8/2021)    Social Connection and Isolation Panel [NHANES]     Frequency of Communication with Friends and Family: Twice a week     Frequency of Social Gatherings with Friends and Family: Twice a week     Attends Orthodox Services: More than 4 times per year     Active Member of Clubs or Organizations: No     Attends Club or Organization Meetings: Not on file     Marital Status:    Interpersonal Safety: Not on file   Housing Stability: Low Risk  (11/8/2021)    Housing Stability Vital Sign     Unable to Pay for Housing in the Last Year: No     Number of Places Lived in the Last Year: 1     Unstable Housing in the Last Year: No              Lab Results    Chemistry/lipid CBC Cardiac Enzymes/BNP/TSH/INR   Lab Results   Component Value Date    CHOL 182 10/11/2022    HDL 91 10/11/2022    LDL 73 10/11/2022    TRIG 90 10/11/2022    CR 0.91 09/06/2023    BUN 13.9 09/06/2023    POTASSIUM 3.9 09/06/2023     (L) 09/06/2023    CO2 25 09/06/2023      Lab Results   Component Value Date    WBC 9.5 09/06/2023    HGB 12.8 09/06/2023    HCT 39.2 09/06/2023    MCV 98 09/06/2023     09/06/2023    A1C 5.4 10/11/2022     Lab Results   Component Value Date    A1C 5.4 10/11/2022    Lab Results   Component Value Date    NTBNP 422 07/11/2023    TSH 2.69 03/17/2023          Bhavik Esposito MD [facility]  Non-Invasive Cardiologist  [facility]  Pager [phone]

## 2023-11-28 NOTE — LETTER
11/28/2023    [place]oz Acevedo [place]  [address] [facility] [address]    RE: Desi Vega       Dear Colleague,     I had the pleasure of seeing Desi Vega in the [facility].  HEART CARE ENCOUNTER NOTE        Assessment/Recommendations   Assessment:    Sick sinus syndrome status post Medtronic dual-chamber permanent pacemaker placement 5/18/1999 with a generator replacement 12/7/2011. Normal device function noted today.  Nonischemic cardiomyopathy with an overall stable left ventricular ejection fraction ranging mostly 40-50% over the past several years. Unclear etiology. No signs or symptoms of congestive heart failure.  Essential hypertension. Controlled.  History of provoked bilateral pulmonary emboli 9/22/2015 following right total knee arthroplasty.    Plan:  Continue losartan 25 mg daily.  We discussed beta blocker therapy for her cardiomyopathy. As his left ventricular ejection fraction has likely been stable and mostly above 40% without symptoms, we will forgo beta blocker therapy at this time.  We can repeat a transthoracic echocardiogram in the next couple years, or sooner if she develops new symptoms.  Follows in device clinic.  Follow-up with me in 1 year.         History of Present Illness   Ms. Desi Vega is a 73 year old female with a significant past history of SSS s/p Medtronic dual-chamber PPM placement 5/18/1999 with a generator replacement 12/7/2011, provoked bilateral PE 9/22/2015 following right total knee replacement, nonischemic cardiomyopathy, and HTN presenting to establish care in general cardiology. She has previously seen my cardiology colleague Dr. Orlin Wells.    Overall she feels well. She does have dyspnea on exertion only when carrying something up the stairs but this is unchanged for the past several years. No chest pain/pressure/tightness, shortness of breath at rest, light headedness/dizziness, pre-syncope, syncope,  lower extremity swelling, palpitations, paroxysmal nocturnal dyspnea (PND), or orthopnea.    Her left ventricular ejection fraction has been noted to be reduced since at least 2015, ranging mostly 40-50%. Her most recent TTE 6/12/2023 suggested her LVEF to be slightly lower at 35-40% although a follow-up pharmacologic nuclear stress test 7/11/2023 showed her LVEF to be 56%. Coronary CTA 8/1/2023 showed normal coronary arteries.     Cardiac Problems and Cardiac Diagnostics     Most Recent Cardiac testing:  ECG dated 7/7/2016 (personaly reviewed and interpreted): SR, normal ECG    Holter monitor 1/27/2016 (report reviewed):  INTERPRETATION:  01/28/2016     INTERPRETATION:  Predominant rhythm is an atrial paced rhythm.  The patient has a normally functioning dual-chamber pacemaker.  Rare ventricular pacing with fusion is noted.  Rates ranged from 60 beats per minute to 140 beats per minute.  Rare atrial premature beats were noted, 12 over 24 hours.  Occasional ventricular premature beats were noted, 241 over 24 hours.  The patient noted shortness of breath associated with sinus rhythm, one at a rate of 117 beats per minute and the second at a rate of 70 beats per minute.     CONCLUSION:  Normally functioning dual-chamber pacemaker with predominant rhythm an atrial paced rhythm, otherwise normal Holter.    Device interrogation 11/28/2023 (report reviewed):  Device: Medtronic, Adapta (D) PPM  Pacing %/Programmed: AP- 77.7%, - 0.1%, DDDR 60/130  Lead(s): Stable  Battery longevity: Estimating 23 months remaining.  Presenting rhythm: AS/VS @ 85 bpm  Underlying rhythm: SR @ 70's  Heart rates: Histograms show primarily 60-90 bpm.  Atrial arrhythmias: Since 2/6/2023- none  Anticoagulant: none  Ventricular arrhythmias: Since 2/6/2023- none  Comments: Normal device function. No changes made.     ECHO 6/12/2023 (report reviewed):   Left ventricular function is decreased. The ejection fraction is 35-40% (moderately  reduced).  Normal right ventricle size and systolic function.  No hemodynamically significant valvular abnormalities on 2D or color flow imaging.    Stress test 7/11/2023 (report reviewed):     Lexiscan stress ECG negative for ischemia.    The nuclear stress test is abnormal.  There is a small area of fixed defect involving the mid to distal anterior to anterolateral wall suggestive of nontransmural infarction.  No ischemia.    The left ventricular ejection fraction at stress is 56% with mild hypokinesis of the distal anterolateral wall.    There is no prior study for comparison.    Abdominal ultrasound 12/2/2021 (report reviewed):  No aortoiliac aneurysm.     Abdominal ultrasound 111/26/2014 (report reviewed):  No aortoiliac aneurysm.     CTA chest 9/22/2015 (report reviewed):  Bilateral pulmonary emboli.     CT coronary angiogram 8/1/2023 (report reviewed):     Right coronary artery dominant.  No observed significant plaque or stenosis within the visualized coronary tree.    Pacemaker catheter in the right heart    Please see separate report radiology for additional findings.     Medications  Allergies   Current Outpatient Medications   Medication Sig Dispense Refill    biotin 2.5 MG CAPS Take 1 capsule by mouth daily       CALCIUM PO Take 1,200 mg by mouth daily      ketoconazole (NIZORAL) 2 % external cream Apply topically daily      losartan (COZAAR) 25 MG tablet TAKE 1 TABLET BY MOUTH  DAILY 100 tablet 2    Lutein 20 MG CAPS Take 20 mg by mouth daily       Multiple Vitamins-Minerals (ONCOVITE) TABS Take 1 tablet by mouth daily       triamcinolone (KENALOG) 0.5 % external ointment Use twice daily to dermatitis on arm 30 g 3    vitamin D3 (CHOLECALCIFEROL) 125 MCG (5000 UT) tablet Take 5,000 Units by mouth daily       Zinc 20 MG CAPS Take 1 capsule by mouth daily        Allergies   Allergen Reactions    Aspirin Swelling    Bee Venom Swelling     Throat swelling, hands swelling    Fire Ant         Physical  Examination Review of Systems   /70 (BP Location: Left arm, Patient Position: Sitting, Cuff Size: Adult Regular)   Pulse 80   Resp 14   Wt 59.4 kg (131 lb)   BMI 22.64 kg/m    Body mass index is 22.64 kg/m .  Wt Readings from Last 3 Encounters:   11/28/23 59.4 kg (131 lb)   09/06/23 59 kg (130 lb)   02/10/23 59.4 kg (131 lb)       General Appearance:   Pleasant  female, appears  stated age. no acute distress, normal body habitus   ENT/Mouth: membranes moist, no apparent gingival bleeding.      EYES:  no scleral icterus, normal conjunctivae   Neck: no carotid bruits. No anterior cervical lymphadenopaty   Respiratory:   lungs are clear to auscultation, no rales or wheezing, equal chest wall expansion    Cardiovascular:   Regular rhythm, normal rate. Normal first and second heart sounds with no murmurs, rubs, or gallops; the carotid, radial and posterior tibial pulses are intact, Jugular venous pressure normal, no edema bilaterally    Abdomen/GI:  no organomegaly, masses, bruits, or tenderness; bowel sounds are present   Extremities: no cyanosis or clubbing   Skin: no xanthelasma, warm.    Heme/lymph/ Immunology No apparent bleeding noted.   Neurologic: Alert and oriented. normal gait, no tremors     Psychiatric: Pleasant, calm, appropriate affect.    A complete 10 system review of systems was performed and is negative except as mentioned in the HPI/subjective.         Past History   Past Medical History:   Past Medical History:   Diagnosis Date    Anhedonia     Arthritis     Diarrhea     thought to be due to gastric bypass    Grief reaction     History of palpitations     Hyperlipidemia     Insomnia     Osteopenia     Osteopenia     Shortness of breath     Sick sinus syndrome (H)     bradycardia - pacemaker placed    TGA (transient global amnesia) 2004    Transient global amnesia - on plavix    Tinnitus     Urinary frequency     with some leakage       Past Surgical History:   Past Surgical History:    Procedure Laterality Date    ANKLE SURGERY      CHOLECYSTECTOMY      GASTRIC BYPASS      HC CORRECT BUNION,DOUBLE OSTEOTOMY      Description: Hallux Valgus (Bunion) Correction;  Proc Date: 01/01/2004;    HC REMOVAL GALLBLADDER      Description: Cholecystectomy;  Proc Date: 01/01/1987;    HEMORRHOIDECTOMY INTERNAL LIGATION      Description: Hemorrhoidectomy;  Proc Date: 01/01/2012;    HYSTERECTOMY  1988    IMPLANT PACEMAKER  1999    JOINT REPLACEMENT Left     MAMMOPLASTY REDUCTION Bilateral 1997    SD LAP,URETHRAL SUSPENSION      Description: Laparoscopic Urethral Suspension For Stress Incontinence;  Proc Date: 01/01/2002;    TOTAL KNEE ARTHROPLASTY Right 9/10/2015    Done by Dr. Gutierrez, [facility]    [facility] REPAIR OF RECTOCELE      Description: Rectocele Repair;  Proc Date: 01/01/2002;    [facility] TOTAL ABDOM HYSTERECTOMY      Description: Hysterectomy;  Proc Date: 01/01/1988;  Comments: for menorrhagia, still has ovaries    [facility] OPEN TREATMENT PROXIMAL FIBULA/SHAFT FRACTURE      Description: Open Treatment Of Fracture Of Fibular Shaft;  Recorded: 06/11/2013;       Family History:   Family History   Problem Relation Age of Onset    Coronary Artery Disease Father     Diabetes Maternal Grandmother     Diabetes Brother         Social History:   Social History     Socioeconomic History    Marital status:      Spouse name: Not on file    Number of children: Not on file    Years of education: Not on file    Highest education level: Not on file   Occupational History    Not on file   Tobacco Use    Smoking status: Never    Smokeless tobacco: Never   Substance and Sexual Activity    Alcohol use: Not on file    Drug use: Not on file    Sexual activity: Not on file   Other Topics Concern    Not on file   Social History Narrative    Not on file     Social Determinants of Health     Financial Resource Strain: Low Risk  (11/8/2021)    Overall Financial Resource Strain (CARDIA)     Difficulty of Paying Living Expenses:  Not hard at all   Food Insecurity: No Food Insecurity (11/8/2021)    Hunger Vital Sign     Worried About Running Out of Food in the Last Year: Never true     Ran Out of Food in the Last Year: Never true   Transportation Needs: No Transportation Needs (11/8/2021)    PRAPARE - Transportation     Lack of Transportation (Medical): No     Lack of Transportation (Non-Medical): No   Physical Activity: Inactive (11/8/2021)    Exercise Vital Sign     Days of Exercise per Week: 0 days     Minutes of Exercise per Session: 0 min   Stress: No Stress Concern Present (11/8/2021)    [employer] - Occupational Stress Questionnaire     Feeling of Stress : Only a little   Social Connections: Moderately Isolated (11/8/2021)    Social Connection and Isolation Panel [NHANES]     Frequency of Communication with Friends and Family: Twice a week     Frequency of Social Gatherings with Friends and Family: Twice a week     Attends Pentec[employer]al Services: More than 4 times per year     Active Member of Clubs or Organizations: No     Attends Club or Organization Meetings: Not on file     Marital Status:    Interpersonal Safety: Not on file   Housing Stability: Low Risk  (11/8/2021)    Housing Stability Vital Sign     Unable to Pay for Housing in the Last Year: No     Number of Places Lived in the Last Year: 1     Unstable Housing in the Last Year: No              Lab Results    Chemistry/lipid CBC Cardiac Enzymes/BNP/TSH/INR   Lab Results   Component Value Date    CHOL 182 10/11/2022    HDL 91 10/11/2022    LDL 73 10/11/2022    TRIG 90 10/11/2022    CR 0.91 09/06/2023    BUN 13.9 09/06/2023    POTASSIUM 3.9 09/06/2023     (L) 09/06/2023    CO2 25 09/06/2023      Lab Results   Component Value Date    WBC 9.5 09/06/2023    HGB 12.8 09/06/2023    HCT 39.2 09/06/2023    MCV 98 09/06/2023     09/06/2023    A1C 5.4 10/11/2022     Lab Results   Component Value Date    A1C 5.4 10/11/2022    Lab Results    Component Value Date    NTBNP 422 07/11/2023    TSH 2.69 03/17/2023          Bhavik Esposito MD [facility]  Non-Invasive Cardiologist  [facility]  Pager [phone]      Thank you for allowing me to participate in the care of your patient.      Sincerely,     Bhavik Esposito MD     [facility]  cc:   Orlin Wells MD  [address]  [place] [phone]

## 2023-12-10 DIAGNOSIS — K21.00 GASTROESOPHAGEAL REFLUX DISEASE WITH ESOPHAGITIS WITHOUT HEMORRHAGE: ICD-10-CM

## 2024-01-07 ENCOUNTER — HEALTH MAINTENANCE LETTER (OUTPATIENT)
Age: 74
End: 2024-01-07

## 2024-01-21 ENCOUNTER — MYC MEDICAL ADVICE (OUTPATIENT)
Dept: FAMILY MEDICINE | Facility: CLINIC | Age: 74
End: 2024-01-21
Payer: COMMERCIAL

## 2024-01-21 DIAGNOSIS — R30.0 DYSURIA: Primary | ICD-10-CM

## 2024-01-22 ENCOUNTER — LAB (OUTPATIENT)
Dept: LAB | Facility: CLINIC | Age: 74
End: 2024-01-22
Payer: COMMERCIAL

## 2024-01-22 DIAGNOSIS — Z13.220 SCREENING FOR HYPERLIPIDEMIA: ICD-10-CM

## 2024-01-22 DIAGNOSIS — R73.01 IMPAIRED FASTING GLUCOSE: ICD-10-CM

## 2024-01-22 DIAGNOSIS — I10 ESSENTIAL HYPERTENSION: ICD-10-CM

## 2024-01-22 DIAGNOSIS — R30.0 DYSURIA: ICD-10-CM

## 2024-01-22 DIAGNOSIS — E03.9 HYPOTHYROIDISM, UNSPECIFIED TYPE: ICD-10-CM

## 2024-01-22 LAB
ALBUMIN UR-MCNC: NEGATIVE MG/DL
ANION GAP SERPL CALCULATED.3IONS-SCNC: 13 MMOL/L (ref 7–15)
APPEARANCE UR: CLEAR
BILIRUB UR QL STRIP: NEGATIVE
BUN SERPL-MCNC: 20.9 MG/DL (ref 8–23)
CALCIUM SERPL-MCNC: 9.1 MG/DL (ref 8.8–10.2)
CHLORIDE SERPL-SCNC: 102 MMOL/L (ref 98–107)
CHOLEST SERPL-MCNC: 200 MG/DL
COLOR UR AUTO: YELLOW
CREAT SERPL-MCNC: 0.98 MG/DL (ref 0.51–0.95)
DEPRECATED HCO3 PLAS-SCNC: 26 MMOL/L (ref 22–29)
EGFRCR SERPLBLD CKD-EPI 2021: 60 ML/MIN/1.73M2
FASTING STATUS PATIENT QL REPORTED: NO
GLUCOSE SERPL-MCNC: 99 MG/DL (ref 70–99)
GLUCOSE UR STRIP-MCNC: NEGATIVE MG/DL
HBA1C MFR BLD: 5.2 % (ref 0–5.6)
HDLC SERPL-MCNC: 103 MG/DL
HGB UR QL STRIP: NEGATIVE
KETONES UR STRIP-MCNC: ABNORMAL MG/DL
LDLC SERPL CALC-MCNC: 81 MG/DL
LEUKOCYTE ESTERASE UR QL STRIP: NEGATIVE
NITRATE UR QL: NEGATIVE
NONHDLC SERPL-MCNC: 97 MG/DL
PH UR STRIP: 6 [PH] (ref 5–7)
POTASSIUM SERPL-SCNC: 4 MMOL/L (ref 3.4–5.3)
SODIUM SERPL-SCNC: 141 MMOL/L (ref 135–145)
SP GR UR STRIP: 1.02 (ref 1–1.03)
TRIGL SERPL-MCNC: 79 MG/DL
TSH SERPL DL<=0.005 MIU/L-ACNC: 2.16 UIU/ML (ref 0.3–4.2)
UROBILINOGEN UR STRIP-ACNC: 0.2 E.U./DL

## 2024-01-22 PROCEDURE — 84443 ASSAY THYROID STIM HORMONE: CPT

## 2024-01-22 PROCEDURE — 83036 HEMOGLOBIN GLYCOSYLATED A1C: CPT

## 2024-01-22 PROCEDURE — 81003 URINALYSIS AUTO W/O SCOPE: CPT

## 2024-01-22 PROCEDURE — 80061 LIPID PANEL: CPT

## 2024-01-22 PROCEDURE — 36415 COLL VENOUS BLD VENIPUNCTURE: CPT

## 2024-01-22 PROCEDURE — 80048 BASIC METABOLIC PNL TOTAL CA: CPT

## 2024-01-24 ASSESSMENT — ENCOUNTER SYMPTOMS
FEVER: 0
MYALGIAS: 0
SHORTNESS OF BREATH: 0
HEARTBURN: 0
PALPITATIONS: 0
NAUSEA: 0
HEMATOCHEZIA: 0
COUGH: 0
WEAKNESS: 0
BREAST MASS: 0
EYE PAIN: 0
DIZZINESS: 0
ABDOMINAL PAIN: 0
DIARRHEA: 0
CHILLS: 0
SORE THROAT: 0
JOINT SWELLING: 0
HEADACHES: 0
PARESTHESIAS: 0
HEMATURIA: 0
CONSTIPATION: 0
FREQUENCY: 0
ARTHRALGIAS: 0
NERVOUS/ANXIOUS: 0
DYSURIA: 0

## 2024-01-24 ASSESSMENT — ACTIVITIES OF DAILY LIVING (ADL): CURRENT_FUNCTION: NO ASSISTANCE NEEDED

## 2024-01-29 PROBLEM — R19.5 LOOSE STOOLS: Status: ACTIVE | Noted: 2024-01-29

## 2024-01-29 PROBLEM — R10.9 NONSPECIFIC ABDOMINAL PAIN: Status: ACTIVE | Noted: 2023-02-08

## 2024-01-29 PROBLEM — R15.9 INCONTINENCE OF FECES: Status: ACTIVE | Noted: 2023-02-08

## 2024-01-29 PROBLEM — E74.10 FRUCTOSE INTOLERANCE: Status: ACTIVE | Noted: 2024-01-29

## 2024-01-29 PROBLEM — R19.8 IRREGULAR BOWEL HABITS: Status: ACTIVE | Noted: 2023-02-08

## 2024-01-29 PROBLEM — R10.9 ABDOMINAL CRAMPING: Status: ACTIVE | Noted: 2024-01-29

## 2024-01-29 PROBLEM — K57.30 DIVERTICULAR DISEASE OF LARGE INTESTINE: Status: ACTIVE | Noted: 2023-05-25

## 2024-01-29 PROBLEM — K64.9 HEMORRHOIDS: Status: ACTIVE | Noted: 2023-05-25

## 2024-01-31 ENCOUNTER — OFFICE VISIT (OUTPATIENT)
Dept: FAMILY MEDICINE | Facility: CLINIC | Age: 74
End: 2024-01-31
Payer: COMMERCIAL

## 2024-01-31 VITALS
WEIGHT: 125.5 LBS | TEMPERATURE: 96.6 F | HEIGHT: 64 IN | SYSTOLIC BLOOD PRESSURE: 124 MMHG | BODY MASS INDEX: 21.43 KG/M2 | RESPIRATION RATE: 12 BRPM | OXYGEN SATURATION: 99 % | DIASTOLIC BLOOD PRESSURE: 78 MMHG | HEART RATE: 71 BPM

## 2024-01-31 DIAGNOSIS — Z00.00 ENCOUNTER FOR MEDICARE ANNUAL WELLNESS EXAM: Primary | ICD-10-CM

## 2024-01-31 DIAGNOSIS — I42.8 NON-ISCHEMIC CARDIOMYOPATHY (H): ICD-10-CM

## 2024-01-31 DIAGNOSIS — I49.5 SICK SINUS SYNDROME (H): ICD-10-CM

## 2024-01-31 DIAGNOSIS — I10 ESSENTIAL HYPERTENSION: ICD-10-CM

## 2024-01-31 DIAGNOSIS — Z29.11 NEED FOR VACCINATION AGAINST RESPIRATORY SYNCYTIAL VIRUS: ICD-10-CM

## 2024-01-31 PROCEDURE — G0439 PPPS, SUBSEQ VISIT: HCPCS | Performed by: FAMILY MEDICINE

## 2024-01-31 RX ORDER — RESPIRATORY SYNCYTIAL VIRUS VACCINE 120MCG/0.5
0.5 KIT INTRAMUSCULAR ONCE
Qty: 1 EACH | Refills: 0 | Status: CANCELLED | OUTPATIENT
Start: 2024-01-31 | End: 2024-01-31

## 2024-01-31 RX ORDER — MULTIVITAMIN WITH IRON
1 TABLET ORAL DAILY
COMMUNITY

## 2024-01-31 RX ORDER — LOSARTAN POTASSIUM 25 MG/1
25 TABLET ORAL DAILY
Qty: 100 TABLET | Refills: 3 | Status: SHIPPED | OUTPATIENT
Start: 2024-01-31

## 2024-01-31 ASSESSMENT — ENCOUNTER SYMPTOMS
EYE PAIN: 0
NERVOUS/ANXIOUS: 0
HEADACHES: 0
DYSURIA: 0
SHORTNESS OF BREATH: 0
ARTHRALGIAS: 0
DIARRHEA: 0
JOINT SWELLING: 0
WEAKNESS: 0
NAUSEA: 0
PALPITATIONS: 0
ABDOMINAL PAIN: 0
DIZZINESS: 0
CHILLS: 0
CONSTIPATION: 0
SORE THROAT: 0
HEMATURIA: 0
MYALGIAS: 0
FREQUENCY: 0
FEVER: 0
COUGH: 0

## 2024-01-31 ASSESSMENT — ACTIVITIES OF DAILY LIVING (ADL): CURRENT_FUNCTION: NO ASSISTANCE NEEDED

## 2024-01-31 NOTE — PATIENT INSTRUCTIONS
"Learning About Being Physically Active  What is physical activity?     Being physically active means doing any kind of activity that gets your body moving.  The types of physical activity that can help you get fit and stay healthy include:  Aerobic or \"cardio\" activities. These make your heart beat faster and make you breathe harder, such as brisk walking, riding a bike, or running. They strengthen your heart and lungs and build up your endurance.  Strength training activities. These make your muscles work against, or \"resist,\" something. Examples include lifting weights or doing push-ups. These activities help tone and strengthen your muscles and bones.  Stretches. These let you move your joints and muscles through their full range of motion. Stretching helps you be more flexible.  Reaching a balance between these three types of physical activity is important because each one contributes to your overall fitness.  What are the benefits of being active?  Being active is one of the best things you can do for your health. It helps you to:  Feel stronger and have more energy to do all the things you like to do.  Focus better at school or work.  Feel, think, and sleep better.  Reach and stay at a healthy weight.  Lose fat and build lean muscle.  Lower your risk for serious health problems, including diabetes, heart attack, high blood pressure, and some cancers.  Keep your heart, lungs, bones, muscles, and joints strong and healthy.  How can you make being active part of your life?  Start slowly. Make it your long-term goal to get at least 30 minutes of exercise on most days of the week. Walking is a good choice. You also may want to do other activities, such as running, swimming, cycling, or playing tennis or team sports.  Pick activities that you like--ones that make your heart beat faster, your muscles stronger, and your muscles and joints more flexible. If you find more than one thing you like doing, do them all. You " "don't have to do the same thing every day.  Get your heart pumping every day. Any activity that makes your heart beat faster and keeps it at that rate for a while counts.  Here are some great ways to get your heart beating faster:  Go for a brisk walk, run, or hike.  Go for a swim or bike ride.  Take an online exercise class or dance.  Play a game of touch football, basketball, or soccer.  Play tennis, pickleball, or racquetball.  Climb stairs.  Even some household chores can be aerobic. Just do them at a faster pace. Raking or mowing the lawn, sweeping the garage, and vacuuming and cleaning your home all can help get your heart rate up.  Strengthen your muscles during the week. You don't have to lift heavy weights or grow big, bulky muscles to get stronger. Doing a few simple activities that make your muscles work against, or \"resist,\" something can help you get stronger. Aim for at least twice a week.  For example, you can:  Do push-ups or sit-ups, which use your own body weight as resistance.  Lift weights or dumbbells or use stretch bands at home or in a gym or community center.  Stretch your muscles often. Stretching will help you as you become more active. It can help you stay flexible and loosen tight muscles. It can also help improve your balance and posture and can be a great way to relax.  Be sure to stretch the muscles you'll be using when you work out. It's best to warm your muscles slightly before you stretch them. Walk or do some other light aerobic activity for a few minutes. Then start stretching.  When you stretch your muscles:  Do it slowly. Stretching is not about going fast or making sudden movements.  Don't push or bounce during a stretch.  Hold each stretch for at least 15 to 30 seconds, if you can. You should feel a stretch in the muscle, but not pain.  Breathe out as you do the stretch. Then breathe in as you hold the stretch. Don't hold your breath.  If you're worried about how more activity " "might affect your health, have a checkup before you start. Follow any special advice your doctor gives you for getting a smart start.  Where can you learn more?  Go to https://www.[employer].net/patiented  Enter W332 in the search box to learn more about \"Learning About Being Physically Active.\"  Current as of: June 6, 2023               Content Version: 13.8    3584-3154 [employer].   Care instructions adapted under license by your healthcare professional. If you have questions about a medical condition or this instruction, always ask your healthcare professional. [employer] disclaims any warranty or liability for your use of this information.      Preventing Falls: Care Instructions  Injuries and health problems such as trouble walking or poor eyesight can increase your risk of falling. So can some medicines. But there are things you can do to help prevent falls. You can exercise to get stronger. You can also arrange your home to make it safer.    Talk to your doctor about the medicines you take. Ask if any of them increase the risk of falls and whether they can be changed or stopped.   Try to exercise regularly. It can help improve your strength and balance. This can help lower your risk of falling.     Practice fall safety and prevention.    Wear low-heeled shoes that fit well and give your feet good support. Talk to your doctor if you have foot problems that make this hard.  Carry a cellphone or wear a medical alert device that you can use to call for help.  Use stepladders instead of chairs to reach high objects. Don't climb if you're at risk for falls. Ask for help, if needed.  Wear the correct eyeglasses, if you need them.    Make your home safer.    Remove rugs, cords, clutter, and furniture from walkways.  Keep your house well lit. Use night-lights in hallways and bathrooms.  Install and use sturdy handrails on stairways.  Wear nonskid footwear, even inside. Don't walk barefoot " "or in socks without shoes.    Be safe outside.    Use handrails, curb cuts, and ramps whenever possible.  Keep your hands free by using a shoulder bag or backpack.  Try to walk in well-lit areas. Watch out for uneven ground, changes in pavement, and debris.  Be careful in the winter. Walk on the grass or gravel when sidewalks are slippery. Use de-icer on steps and walkways. Add non-slip devices to shoes.    Put grab bars and nonskid mats in your shower or tub and near the toilet. Try to use a shower chair or bath bench when bathing.   Get into a tub or shower by putting in your weaker leg first. Get out with your strong side first. Have a phone or medical alert device in the bathroom with you.   Where can you learn more?  Go to https://www.[employer]/patiented  Enter G117 in the search box to learn more about \"Preventing Falls: Care Instructions.\"  Current as of: July 18, 2023               Content Version: 13.8    4910-9433 [employer].   Care instructions adapted under license by your healthcare professional. If you have questions about a medical condition or this instruction, always ask your healthcare professional. [employer] disclaims any warranty or liability for your use of this information.      Preventive Care Advice   This is general advice given by our system to help you stay healthy. However, your care team may have specific advice just for you. Please talk to your care team about your preventive care needs.  Nutrition  Eat 5 or more servings of fruits and vegetables each day.  Try wheat bread, brown rice and whole grain pasta (instead of white bread, rice, and pasta).  Get enough calcium and vitamin D. Check the label on foods and aim for 100% of the RDA (recommended daily allowance).  Lifestyle  Exercise at least 150 minutes each week  (30 minutes a day, 5 days a week).  Do muscle strengthening activities 2 days a week. These help control your weight and prevent " disease.  No smoking.  Wear sunscreen to prevent skin cancer.  Have a dental exam and cleaning every 6 months.  Yearly exams  See your health care team every year to talk about:  Any changes in your health.  Any medicines your care team has prescribed.  Preventive care, family planning, and ways to prevent chronic diseases.  Shots (vaccines)   HPV shots (up to age 26), if you've never had them before.  Hepatitis B shots (up to age 59), if you've never had them before.  COVID-19 shot: Get this shot when it's due.  Flu shot: Get a flu shot every year.  Tetanus shot: Get a tetanus shot every 10 years.  Pneumococcal, hepatitis A, and RSV shots: Ask your care team if you need these based on your risk.  Shingles shot (for age 50 and up)  General health tests  Diabetes screening:  Starting at age 35, Get screened for diabetes at least every 3 years.  If you are younger than age 35, ask your care team if you should be screened for diabetes.  Cholesterol test: At age 39, start having a cholesterol test every 5 years, or more often if advised.  Bone density scan (DEXA): At age 50, ask your care team if you should have this scan for osteoporosis (brittle bones).  Hepatitis C: Get tested at least once in your life.  STIs (sexually transmitted infections)  Before age 24: Ask your care team if you should be screened for STIs.  After age 24: Get screened for STIs if you're at risk. You are at risk for STIs (including HIV) if:  You are sexually active with more than one person.  You don't use condoms every time.  You or a partner was diagnosed with a sexually transmitted infection.  If you are at risk for HIV, ask about PrEP medicine to prevent HIV.  Get tested for HIV at least once in your life, whether you are at risk for HIV or not.  Cancer screening tests  Cervical cancer screening: If you have a cervix, begin getting regular cervical cancer screening tests starting at age 21.  Breast cancer scan (mammogram): If you've ever had  breasts, begin having regular mammograms starting at age 40. This is a scan to check for breast cancer.  Colon cancer screening: It is important to start screening for colon cancer at age 45.  Have a colonoscopy test every 10 years (or more often if you're at risk) Or, ask your provider about stool tests like a FIT test every year or Cologuard test every 3 years.  To learn more about your testing options, visit:   https://www.[employer]/579178.pdf.  For help making a decision, visit:   https://bit.[facility]/kg34372.  Prostate cancer screening test: If you have a prostate, ask your care team if a prostate cancer screening test (PSA) at age 55 is right for you.  Lung cancer screening: If you are a current or former smoker ages 50 to 80, ask your care team if ongoing lung cancer screenings are right for you.  For informational purposes only. Not to replace the advice of your health care provider. Copyright   2023 [facility]. All rights reserved. Clinically reviewed by the [facility] Transitions Program. Wowo [id number] - REV 01/24.

## 2024-01-31 NOTE — PROGRESS NOTES
"Preventive Care Visit  [facility] JOCELINE Acevedo MD, Family Medicine  Jan 31, 2024      SUBJECTIVE:   Desi is a 73 year old, presenting for the following:  Medicare Visit (AWV-Px/Labs drawn already)      Are you in the first 12 months of your Medicare coverage?  No    Healthy Habits:     In general, how would you rate your overall health?  Excellent    Frequency of exercise:  None    Do you usually eat at least 4 servings of fruit and vegetables a day, include whole grains    & fiber and avoid regularly eating high fat or \"junk\" foods?  No    Taking medications regularly:  Yes    Medication side effects:  None    Ability to successfully perform activities of daily living:  No assistance needed    Home Safety:  No safety concerns identified    Hearing Impairment:  No hearing concerns    In the past 6 months, have you been bothered by leaking of urine? Yes    In general, how would you rate your overall mental or emotional health?  Excellent    Answers submitted by the patient for this visit:  Annual Preventive Visit (Submitted on 1/24/2024)  Chief Complaint: Annual Exam:  Blood in stool: No  heartburn: No  peripheral edema: No  mood changes: No  Skin sensation changes: No  tenderness: No  breast mass: No  breast discharge: No      Today's PHQ-2 Score:       1/30/2024     2:14 PM   PHQ-2 ( 1999 [employer])   Q1: Little interest or pleasure in doing things 0   Q2: Feeling down, depressed or hopeless 0   PHQ-2 Score 0   Q1: Little interest or pleasure in doing things Not at all   Q2: Feeling down, depressed or hopeless Not at all   PHQ-2 Score 0     Have you ever done Advance Care Planning? (For example, a Health Directive, POLST, or a discussion with a medical provider or your loved ones about your wishes): No, advance care planning information given to patient to review.  Patient plans to discuss their wishes with loved ones or provider.      Fall risk  Fallen 2 or more times in the past " year?: Yes  Any fall with injury in the past year?: No        Cognitive Screening   1) Repeat 3 items (Leader, Season, Table)    2) Clock draw: NORMAL  3) 3 item recall: Recalls 3 objects  Results: 3 items recalled: COGNITIVE IMPAIRMENT LESS LIKELY    Mini-CogTM Copyright EUGENIO Bowden. Licensed by the author for use in [facility]; reprinted with permission (iman@[facility]). All rights reserved.      Do you have sleep apnea, excessive snoring or daytime drowsiness? : no    Reviewed and updated as needed this visit by clinical staff    Allergies  Meds              Reviewed and updated as needed this visit by Provider                  Social History     Tobacco Use    Smoking status: Never    Smokeless tobacco: Never   Substance Use Topics    Alcohol use: Not on file             1/24/2024    12:30 PM   Alcohol Use   Prescreen: >3 drinks/day or >7 drinks/week? No     Do you have a current opioid prescription? No  Do you use any other controlled substances or medications that are not prescribed by a provider? None      She is overall doing well.  In the winter she goes to [place] with her cousin.    She has no specific questions or concerns.  Following with cardiology.  She has a pacemaker.    Current providers sharing in care for this patient include:   Patient Care Team:  Roz Acevedo MD as PCP - General  Roz Acevedo MD as Assigned PCP  Rodney Vee MD as MD (Endocrinology, Diabetes, and Metabolism)  Rodney Vee MD as Assigned Endocrinology Provider  Bhavik Esposito MD as Assigned Heart and Vascular Provider    The following health maintenance items are reviewed in Epic and correct as of today:  Health Maintenance   Topic Date Due    HF ACTION PLAN  Never done    RSV VACCINE (Pregnancy & 60+) (1 - 1-dose 60+ series) Never done    ALT  07/06/2022    COVID-19 Vaccine (7 - 2023-24 season) 11/17/2023    BMP  07/22/2024    CBC  09/06/2024    LIPID  01/22/2025    MEDICARE ANNUAL  "WELLNESS VISIT  01/31/2025    ANNUAL REVIEW OF HM ORDERS  01/31/2025    FALL RISK ASSESSMENT  01/31/2025    DTAP/TDAP/TD IMMUNIZATION (2 - Td or Tdap) 03/18/2026    GLUCOSE  01/22/2027    ADVANCE CARE PLANNING  01/31/2029    DEXA  11/01/2036    TSH W/FREE T4 REFLEX  Completed    HEPATITIS C SCREENING  Completed    PHQ-2 (once per calendar year)  Completed    INFLUENZA VACCINE  Completed    Pneumococcal Vaccine: 65+ Years  Completed    ZOSTER IMMUNIZATION  Completed    IPV IMMUNIZATION  Aged Out    HPV IMMUNIZATION  Aged Out    MENINGITIS IMMUNIZATION  Aged Out    RSV MONOCLONAL ANTIBODY  Aged Out    MAMMO SCREENING  Discontinued    COLORECTAL CANCER SCREENING  Discontinued     Lab work is in process      Mammogram Screening - Patient over age 75, has elected to continue with screening.  Pertinent mammograms are reviewed under the imaging tab.  Review of Systems   Constitutional:  Negative for chills and fever.   HENT:  Negative for congestion, ear pain, hearing loss and sore throat.    Eyes:  Negative for pain and visual disturbance.   Respiratory:  Negative for cough and shortness of breath.    Cardiovascular:  Negative for chest pain and palpitations.   Gastrointestinal:  Negative for abdominal pain, constipation, diarrhea and nausea.   Genitourinary:  Negative for dysuria, frequency, genital sores, hematuria, pelvic pain, urgency, vaginal bleeding and vaginal discharge.   Musculoskeletal:  Negative for arthralgias, joint swelling and myalgias.   Skin:  Negative for rash.   Neurological:  Negative for dizziness, weakness and headaches.   Psychiatric/Behavioral:  The patient is not nervous/anxious.           OBJECTIVE:   /78   Pulse 71   Temp (!) 96.6  F (35.9  C) (Tympanic)   Resp 12   Ht 1.613 m (5' 3.5\")   Wt 56.9 kg (125 lb 8 oz)   LMP  (LMP Unknown)   SpO2 99%   BMI 21.88 kg/m     Estimated body mass index is 21.88 kg/m  as calculated from the following:    Height as of this encounter: 1.613 m " "(5' 3.5\").    Weight as of this encounter: 56.9 kg (125 lb 8 oz).  Physical Exam  Objective:  Vital signs reviewed and stable  General: No acute distress  Psych: Appropriate affect  HEENT: moist mucous membranes, pupils equal, round, reactive to light and accomodation, tympanic membranes are pearly grey bilaterally  Lymph: no cervical or supraclavicular lymphadenopathy  Cardiovascular: regular rate and rhythm with no murmur  Pulmonary: clear to auscultation bilaterally with no wheeze  Abdomen: soft, non tender, non distended with normo-active bowel sounds  Extremities: warm and well perfused with no edema  Skin: warm and dry with no rash          ASSESSMENT / PLAN:   1. Encounter for Medicare annual wellness exam  Reviewed her lab test which he had previously done.    2. Essential hypertension  Blood pressure under good control.  - losartan (COZAAR) 25 MG tablet; Take 1 tablet (25 mg) by mouth daily  Dispense: 100 tablet; Refill: 3    3. Need for vaccination against respiratory syncytial virus    4. Non-ischemic cardiomyopathy (H)  Stable.  Follows cardiology    5. Sick sinus syndrome (H)  Stable.  Follows with cardiology      Patient has been advised of split billing requirements and indicates understanding: Yes      Counseling  Reviewed preventive health counseling, as reflected in patient instructions        She reports that she has never smoked. She has never used smokeless tobacco.      Appropriate preventive services were discussed with this patient, including applicable screening as appropriate for fall prevention, nutrition, physical activity, Tobacco-use cessation, weight loss and cognition.  Checklist reviewing preventive services available has been given to the patient.    Reviewed patients plan of care and provided an AVS. The Basic Care Plan (routine screening as documented in Health Maintenance) for Desi meets the Care Plan requirement. This Care Plan has been established and reviewed with the " Patient.          Signed Electronically by: Roz Acevedo MD    Identified Health Risks  I have reviewed Opioid Use Disorder and Substance Use Disorder risk factors and made any needed referrals.

## 2024-02-09 ENCOUNTER — ANCILLARY PROCEDURE (OUTPATIENT)
Dept: CARDIOLOGY | Facility: CLINIC | Age: 74
End: 2024-02-09
Attending: INTERNAL MEDICINE
Payer: COMMERCIAL

## 2024-02-09 ENCOUNTER — DOCUMENTATION ONLY (OUTPATIENT)
Dept: CARDIOLOGY | Facility: CLINIC | Age: 74
End: 2024-02-09

## 2024-02-09 DIAGNOSIS — I49.5 SICK SINUS SYNDROME (H): ICD-10-CM

## 2024-02-09 DIAGNOSIS — Z95.0 CARDIAC PACEMAKER IN SITU: ICD-10-CM

## 2024-02-09 LAB
MDC_IDC_LEAD_CONNECTION_STATUS: NORMAL
MDC_IDC_LEAD_CONNECTION_STATUS: NORMAL
MDC_IDC_LEAD_IMPLANT_DT: NORMAL
MDC_IDC_LEAD_IMPLANT_DT: NORMAL
MDC_IDC_LEAD_LOCATION: NORMAL
MDC_IDC_LEAD_LOCATION: NORMAL
MDC_IDC_LEAD_MFG: NORMAL
MDC_IDC_LEAD_MFG: NORMAL
MDC_IDC_LEAD_MODEL: NORMAL
MDC_IDC_LEAD_MODEL: NORMAL
MDC_IDC_LEAD_POLARITY_TYPE: NORMAL
MDC_IDC_LEAD_POLARITY_TYPE: NORMAL
MDC_IDC_LEAD_SERIAL: NORMAL
MDC_IDC_LEAD_SERIAL: NORMAL
MDC_IDC_LEAD_SPECIAL_FUNCTION: NORMAL
MDC_IDC_LEAD_SPECIAL_FUNCTION: NORMAL
MDC_IDC_MSMT_BATTERY_DTM: NORMAL
MDC_IDC_MSMT_BATTERY_IMPEDANCE: 3532 OHM
MDC_IDC_MSMT_BATTERY_REMAINING_LONGEVITY: 14 MO
MDC_IDC_MSMT_BATTERY_STATUS: NORMAL
MDC_IDC_MSMT_BATTERY_VOLTAGE: 2.72 V
MDC_IDC_MSMT_LEADCHNL_RA_IMPEDANCE_VALUE: 770 OHM
MDC_IDC_MSMT_LEADCHNL_RA_PACING_THRESHOLD_AMPLITUDE: 0.75 V
MDC_IDC_MSMT_LEADCHNL_RA_PACING_THRESHOLD_PULSEWIDTH: 0.4 MS
MDC_IDC_MSMT_LEADCHNL_RV_IMPEDANCE_VALUE: 588 OHM
MDC_IDC_PG_IMPLANT_DTM: NORMAL
MDC_IDC_PG_MFG: NORMAL
MDC_IDC_PG_MODEL: NORMAL
MDC_IDC_PG_SERIAL: NORMAL
MDC_IDC_PG_TYPE: NORMAL
MDC_IDC_SESS_CLINIC_NAME: NORMAL
MDC_IDC_SESS_DTM: NORMAL
MDC_IDC_SESS_TYPE: NORMAL
MDC_IDC_SET_BRADY_AT_MODE_SWITCH_MODE: NORMAL
MDC_IDC_SET_BRADY_AT_MODE_SWITCH_RATE: 175 {BEATS}/MIN
MDC_IDC_SET_BRADY_LOWRATE: 60 {BEATS}/MIN
MDC_IDC_SET_BRADY_MAX_SENSOR_RATE: 130 {BEATS}/MIN
MDC_IDC_SET_BRADY_MAX_TRACKING_RATE: 130 {BEATS}/MIN
MDC_IDC_SET_BRADY_MODE: NORMAL
MDC_IDC_SET_BRADY_PAV_DELAY_LOW: 150 MS
MDC_IDC_SET_BRADY_SAV_DELAY_LOW: 120 MS
MDC_IDC_SET_LEADCHNL_RA_PACING_AMPLITUDE: 1.62
MDC_IDC_SET_LEADCHNL_RA_PACING_CAPTURE_MODE: NORMAL
MDC_IDC_SET_LEADCHNL_RA_PACING_POLARITY: NORMAL
MDC_IDC_SET_LEADCHNL_RA_PACING_PULSEWIDTH: 0.4 MS
MDC_IDC_SET_LEADCHNL_RA_SENSING_POLARITY: NORMAL
MDC_IDC_SET_LEADCHNL_RA_SENSING_SENSITIVITY: 0.35 MV
MDC_IDC_SET_LEADCHNL_RV_PACING_AMPLITUDE: 3 V
MDC_IDC_SET_LEADCHNL_RV_PACING_CAPTURE_MODE: NORMAL
MDC_IDC_SET_LEADCHNL_RV_PACING_POLARITY: NORMAL
MDC_IDC_SET_LEADCHNL_RV_PACING_PULSEWIDTH: 1 MS
MDC_IDC_SET_LEADCHNL_RV_SENSING_POLARITY: NORMAL
MDC_IDC_SET_LEADCHNL_RV_SENSING_SENSITIVITY: 4 MV
MDC_IDC_SET_ZONE_DETECTION_INTERVAL: 333.33 MS
MDC_IDC_SET_ZONE_DETECTION_INTERVAL: 342.86 MS
MDC_IDC_SET_ZONE_STATUS: NORMAL
MDC_IDC_SET_ZONE_STATUS: NORMAL
MDC_IDC_SET_ZONE_TYPE: NORMAL
MDC_IDC_SET_ZONE_TYPE: NORMAL
MDC_IDC_SET_ZONE_VENDOR_TYPE: NORMAL
MDC_IDC_SET_ZONE_VENDOR_TYPE: NORMAL
MDC_IDC_STAT_AT_BURDEN_PERCENT: 0 %
MDC_IDC_STAT_AT_DTM_END: NORMAL
MDC_IDC_STAT_AT_DTM_START: NORMAL
MDC_IDC_STAT_AT_MODE_SW_COUNT: 0
MDC_IDC_STAT_BRADY_AP_VP_PERCENT: 0 %
MDC_IDC_STAT_BRADY_AP_VS_PERCENT: 79 %
MDC_IDC_STAT_BRADY_AS_VP_PERCENT: 0 %
MDC_IDC_STAT_BRADY_AS_VS_PERCENT: 21 %
MDC_IDC_STAT_BRADY_DTM_END: NORMAL
MDC_IDC_STAT_BRADY_DTM_START: NORMAL
MDC_IDC_STAT_EPISODE_RECENT_COUNT: 0
MDC_IDC_STAT_EPISODE_RECENT_COUNT: 0
MDC_IDC_STAT_EPISODE_RECENT_COUNT_DTM_END: NORMAL
MDC_IDC_STAT_EPISODE_RECENT_COUNT_DTM_END: NORMAL
MDC_IDC_STAT_EPISODE_RECENT_COUNT_DTM_START: NORMAL
MDC_IDC_STAT_EPISODE_RECENT_COUNT_DTM_START: NORMAL
MDC_IDC_STAT_EPISODE_TYPE: NORMAL
MDC_IDC_STAT_EPISODE_TYPE: NORMAL

## 2024-02-09 PROCEDURE — 93294 REM INTERROG EVL PM/LDLS PM: CPT | Performed by: INTERNAL MEDICINE

## 2024-02-09 PROCEDURE — 93296 REM INTERROG EVL PM/IDS: CPT | Performed by: INTERNAL MEDICINE

## 2024-02-09 NOTE — PROGRESS NOTES
1/9/24: FYI-- Battery estimates 14 months.    Encounter Type: 3 month scheduled remote transmission  Device: Medtronic Adapta (D) Pacemaker  Pacing % /Programmed: AP 78.7%,  <0.1% / AAIR<=>DDDR  bpm  Lead(s): RA, RV  Battery longevity: Estimates 14 months remaining  Presenting: AS/VS 98 bpm  Atrial high rates: None  Anticoagulant: Plavix only, allergic to ASA  Ventricular High rates: None  Comments: Normal device function. Per notes: 7-25-13: Per GAG continue routine RV f/u, replace when it fails.   6-25-13: CXR review shows probable pulled back RV lead.    Plan: Next remote 5/16/24, reminder letter mailed to patient. Mell Laguerre, Device RN

## 2024-05-16 NOTE — LETTER
Letter by Roz Acevedo MD at      Author: Roz Acevedo MD Service: -- Author Type: --    Filed:  Encounter Date: 9/13/2019 Status: (Other)         Desi Vega  [address] [place] [address]             September 13, 2019         Dear Ms. Vega,    Below are the results from your recent visit:    Resulted Orders   Thyroid Stimulating Hormone (TSH)   Result Value Ref Range    TSH 1.51 0.30 - 5.00 uIU/mL       Thyroid is normal!  I hope you are well!    Please call with questions or contact us using [employer].    Sincerely,        Electronically signed by Roz Acevedo MD        [de-identified] :    RIGHT Shoulder  Inspection:  Scapula Winging: Negative  Deformity: None  Erythema: None  Ecchymosis: None  Abrasions: None  Effusion: None     Range of Motion:  Active Forward Flexion: 160 degrees  Passive Forward Flexion: 170 degrees  Active IR : L4  Passive ER : 30 degrees     Motor Exam:  Forward Flexion: 4+ out of 5  Flexion Plane of Scapula: 5 out of 5  Abduction: 4+ out of 5  Internal Rotation: 5 out of 5  External Rotation: 4+ out of 5  Distal Motor Strength: 5 out of 5       Provocative Tests:  Drop Arm: Negative  Barber/Impingement: Positive  [place]: Positive  X-Arm Adduction: positive Belly Press: Negative  Bear Hug: Negative  Lift Off: Negative  Apprehension: Negative  Relocation: Negative  Posterior Load & Shift: Negative     Palpation:  AC Joint: Nontender  Clavicle: Nontender  SC Joint: Nontender  Bicipital Groove: Positive  Coracoid Process: Nontender  Pectoralis Minor Tendon: Nontender  Pectoralis Major Tendon: Nontender & palpably intact  Latissimus Dorsi: Nontender  Proximal Humerus: Positive  Scapula Body: Nontender  Medial Scapula Boarder: Nontender  Scapula Spine: Nontender     Neurologic Exam: Sensation to Light Touch:  Axillary: Grossly intact  Ulnar: Grossly intact  Radial: Grossly intact  Median: Grossly intact  Other:  N/A     Circulatory/Pulses:  Ulnar: 2+  Radial: 2+  Other Pertinent Findings: None       LEFT Shoulder  Range of Motion:  Active Forward Flexion: 180 degrees  Active Abduction: 180 degrees  Passive Forward Flexion: 180 degrees  Passive Abduction: 180 degrees  ER @ 90 degrees: 90 degrees  IR @ 90 degrees: 45 degrees  ER @ 0 degrees: 50 degrees     Motor Exam:  Forward Flexion: 5 out of 5  Flexion Plane of Scapula: 5 out of 5  Abduction: 5 out of 5  Internal Rotation: 5 out of 5  External Rotation: 5 out of 5  Distal Motor Strength: 5 out of 5     Stability Testing:  Anterior: 1+  Posterior: 1+  Sulcus N: 1+  Sulcus ER: 1+     Other Pertinent Findings: None

## 2024-06-24 ENCOUNTER — ANCILLARY PROCEDURE (OUTPATIENT)
Dept: CARDIOLOGY | Facility: CLINIC | Age: 74
End: 2024-06-24
Attending: INTERNAL MEDICINE
Payer: COMMERCIAL

## 2024-06-24 DIAGNOSIS — I49.5 SICK SINUS SYNDROME (H): ICD-10-CM

## 2024-06-24 DIAGNOSIS — Z95.0 CARDIAC PACEMAKER IN SITU: ICD-10-CM

## 2024-06-26 LAB
MDC_IDC_LEAD_CONNECTION_STATUS: NORMAL
MDC_IDC_LEAD_CONNECTION_STATUS: NORMAL
MDC_IDC_LEAD_IMPLANT_DT: NORMAL
MDC_IDC_LEAD_IMPLANT_DT: NORMAL
MDC_IDC_LEAD_LOCATION: NORMAL
MDC_IDC_LEAD_LOCATION: NORMAL
MDC_IDC_LEAD_MFG: NORMAL
MDC_IDC_LEAD_MFG: NORMAL
MDC_IDC_LEAD_MODEL: NORMAL
MDC_IDC_LEAD_MODEL: NORMAL
MDC_IDC_LEAD_POLARITY_TYPE: NORMAL
MDC_IDC_LEAD_POLARITY_TYPE: NORMAL
MDC_IDC_LEAD_SERIAL: NORMAL
MDC_IDC_LEAD_SERIAL: NORMAL
MDC_IDC_LEAD_SPECIAL_FUNCTION: NORMAL
MDC_IDC_LEAD_SPECIAL_FUNCTION: NORMAL
MDC_IDC_MSMT_BATTERY_DTM: NORMAL
MDC_IDC_MSMT_BATTERY_IMPEDANCE: 4137 OHM
MDC_IDC_MSMT_BATTERY_REMAINING_LONGEVITY: 8 MO
MDC_IDC_MSMT_BATTERY_STATUS: NORMAL
MDC_IDC_MSMT_BATTERY_VOLTAGE: 2.7 V
MDC_IDC_MSMT_LEADCHNL_RA_IMPEDANCE_VALUE: 824 OHM
MDC_IDC_MSMT_LEADCHNL_RA_PACING_THRESHOLD_AMPLITUDE: 0.75 V
MDC_IDC_MSMT_LEADCHNL_RA_PACING_THRESHOLD_PULSEWIDTH: 0.4 MS
MDC_IDC_MSMT_LEADCHNL_RV_IMPEDANCE_VALUE: 580 OHM
MDC_IDC_MSMT_LEADCHNL_RV_SENSING_INTR_AMPL: 11.2 MV
MDC_IDC_PG_IMPLANT_DTM: NORMAL
MDC_IDC_PG_MFG: NORMAL
MDC_IDC_PG_MODEL: NORMAL
MDC_IDC_PG_SERIAL: NORMAL
MDC_IDC_PG_TYPE: NORMAL
MDC_IDC_SESS_CLINIC_NAME: NORMAL
MDC_IDC_SESS_DTM: NORMAL
MDC_IDC_SESS_TYPE: NORMAL
MDC_IDC_SET_BRADY_AT_MODE_SWITCH_MODE: NORMAL
MDC_IDC_SET_BRADY_AT_MODE_SWITCH_RATE: 175 {BEATS}/MIN
MDC_IDC_SET_BRADY_LOWRATE: 60 {BEATS}/MIN
MDC_IDC_SET_BRADY_MAX_SENSOR_RATE: 130 {BEATS}/MIN
MDC_IDC_SET_BRADY_MAX_TRACKING_RATE: 130 {BEATS}/MIN
MDC_IDC_SET_BRADY_MODE: NORMAL
MDC_IDC_SET_BRADY_PAV_DELAY_LOW: 150 MS
MDC_IDC_SET_BRADY_SAV_DELAY_LOW: 120 MS
MDC_IDC_SET_LEADCHNL_RA_PACING_AMPLITUDE: 1.5 V
MDC_IDC_SET_LEADCHNL_RA_PACING_CAPTURE_MODE: NORMAL
MDC_IDC_SET_LEADCHNL_RA_PACING_POLARITY: NORMAL
MDC_IDC_SET_LEADCHNL_RA_PACING_PULSEWIDTH: 0.4 MS
MDC_IDC_SET_LEADCHNL_RA_SENSING_POLARITY: NORMAL
MDC_IDC_SET_LEADCHNL_RA_SENSING_SENSITIVITY: 0.35 MV
MDC_IDC_SET_LEADCHNL_RV_PACING_AMPLITUDE: 3 V
MDC_IDC_SET_LEADCHNL_RV_PACING_CAPTURE_MODE: NORMAL
MDC_IDC_SET_LEADCHNL_RV_PACING_POLARITY: NORMAL
MDC_IDC_SET_LEADCHNL_RV_PACING_PULSEWIDTH: 1 MS
MDC_IDC_SET_LEADCHNL_RV_SENSING_POLARITY: NORMAL
MDC_IDC_SET_LEADCHNL_RV_SENSING_SENSITIVITY: 4 MV
MDC_IDC_SET_ZONE_DETECTION_INTERVAL: 333.33 MS
MDC_IDC_SET_ZONE_DETECTION_INTERVAL: 342.86 MS
MDC_IDC_SET_ZONE_STATUS: NORMAL
MDC_IDC_SET_ZONE_STATUS: NORMAL
MDC_IDC_SET_ZONE_TYPE: NORMAL
MDC_IDC_SET_ZONE_TYPE: NORMAL
MDC_IDC_SET_ZONE_VENDOR_TYPE: NORMAL
MDC_IDC_SET_ZONE_VENDOR_TYPE: NORMAL
MDC_IDC_STAT_AT_BURDEN_PERCENT: 0 %
MDC_IDC_STAT_AT_DTM_END: NORMAL
MDC_IDC_STAT_AT_DTM_START: NORMAL
MDC_IDC_STAT_AT_MODE_SW_COUNT: 2
MDC_IDC_STAT_BRADY_AP_VP_PERCENT: 0 %
MDC_IDC_STAT_BRADY_AP_VS_PERCENT: 75 %
MDC_IDC_STAT_BRADY_AS_VP_PERCENT: 0 %
MDC_IDC_STAT_BRADY_AS_VS_PERCENT: 25 %
MDC_IDC_STAT_BRADY_DTM_END: NORMAL
MDC_IDC_STAT_BRADY_DTM_START: NORMAL
MDC_IDC_STAT_BRADY_RA_PERCENT_PACED: 74 %
MDC_IDC_STAT_BRADY_RV_PERCENT_PACED: 1 %
MDC_IDC_STAT_EPISODE_RECENT_COUNT: 0
MDC_IDC_STAT_EPISODE_RECENT_COUNT: 0
MDC_IDC_STAT_EPISODE_RECENT_COUNT_DTM_END: NORMAL
MDC_IDC_STAT_EPISODE_RECENT_COUNT_DTM_END: NORMAL
MDC_IDC_STAT_EPISODE_RECENT_COUNT_DTM_START: NORMAL
MDC_IDC_STAT_EPISODE_RECENT_COUNT_DTM_START: NORMAL
MDC_IDC_STAT_EPISODE_TYPE: NORMAL
MDC_IDC_STAT_EPISODE_TYPE: NORMAL

## 2024-06-26 PROCEDURE — 93296 REM INTERROG EVL PM/IDS: CPT | Performed by: INTERNAL MEDICINE

## 2024-06-26 PROCEDURE — 93294 REM INTERROG EVL PM/LDLS PM: CPT | Performed by: INTERNAL MEDICINE

## 2024-06-27 ENCOUNTER — ANCILLARY PROCEDURE (OUTPATIENT)
Dept: MAMMOGRAPHY | Facility: CLINIC | Age: 74
End: 2024-06-27
Attending: FAMILY MEDICINE
Payer: COMMERCIAL

## 2024-06-27 DIAGNOSIS — Z12.31 VISIT FOR SCREENING MAMMOGRAM: ICD-10-CM

## 2024-06-27 PROCEDURE — 77063 BREAST TOMOSYNTHESIS BI: CPT

## 2024-07-09 ENCOUNTER — TRANSFERRED RECORDS (OUTPATIENT)
Dept: HEALTH INFORMATION MANAGEMENT | Facility: CLINIC | Age: 74
End: 2024-07-09
Payer: COMMERCIAL

## 2024-07-10 ENCOUNTER — ANCILLARY PROCEDURE (OUTPATIENT)
Dept: BONE DENSITY | Facility: CLINIC | Age: 74
End: 2024-07-10
Attending: FAMILY MEDICINE
Payer: COMMERCIAL

## 2024-07-10 DIAGNOSIS — Z78.0 POST-MENOPAUSAL: ICD-10-CM

## 2024-07-10 PROCEDURE — 77080 DXA BONE DENSITY AXIAL: CPT | Mod: TC | Performed by: PHYSICIAN ASSISTANT

## 2024-09-06 ENCOUNTER — MYC MEDICAL ADVICE (OUTPATIENT)
Dept: FAMILY MEDICINE | Facility: CLINIC | Age: 74
End: 2024-09-06
Payer: COMMERCIAL

## 2024-09-06 ENCOUNTER — TELEPHONE (OUTPATIENT)
Dept: FAMILY MEDICINE | Facility: CLINIC | Age: 74
End: 2024-09-06
Payer: COMMERCIAL

## 2024-09-06 DIAGNOSIS — Z13.220 SCREENING FOR HYPERLIPIDEMIA: ICD-10-CM

## 2024-09-06 DIAGNOSIS — R73.01 IMPAIRED FASTING GLUCOSE: ICD-10-CM

## 2024-09-06 DIAGNOSIS — I10 ESSENTIAL HYPERTENSION: Primary | ICD-10-CM

## 2024-09-06 DIAGNOSIS — E03.9 HYPOTHYROIDISM, UNSPECIFIED TYPE: ICD-10-CM

## 2024-09-06 NOTE — TELEPHONE ENCOUNTER
Patient was called and left [facility] today due to scheduling her Annual Wellness visit too soon. She's returning phone call regarding this. Advised her last annual wellness exam with Dr. Acevedo was 1/31/2024 and will need to be 2/1/25 or later.   She states she will cancel her appointment and make it later.  Dhara Munroe RN on 9/6/2024 at 10:12 AM

## 2024-11-08 ENCOUNTER — TRANSFERRED RECORDS (OUTPATIENT)
Dept: HEALTH INFORMATION MANAGEMENT | Facility: CLINIC | Age: 74
End: 2024-11-08
Payer: COMMERCIAL

## 2024-11-12 ENCOUNTER — TRANSFERRED RECORDS (OUTPATIENT)
Dept: HEALTH INFORMATION MANAGEMENT | Facility: CLINIC | Age: 74
End: 2024-11-12
Payer: COMMERCIAL

## 2024-11-18 ENCOUNTER — HOSPITAL ENCOUNTER (OUTPATIENT)
Dept: RADIOLOGY | Facility: HOSPITAL | Age: 74
Discharge: HOME | End: 2024-11-18
Payer: COMMERCIAL

## 2024-11-18 ENCOUNTER — HOSPITAL ENCOUNTER (OUTPATIENT)
Dept: ULTRASOUND IMAGING | Facility: HOSPITAL | Age: 74
Discharge: HOME | End: 2024-11-18
Payer: COMMERCIAL

## 2024-11-18 DIAGNOSIS — R19.5 OTHER FECAL ABNORMALITIES: ICD-10-CM

## 2024-11-18 DIAGNOSIS — R14.0 ABDOMINAL BLOATING: ICD-10-CM

## 2024-11-18 DIAGNOSIS — R19.4 CHANGE IN BOWEL HABIT: ICD-10-CM

## 2024-11-18 DIAGNOSIS — K64.8 OTHER HEMORRHOIDS: ICD-10-CM

## 2024-11-18 PROCEDURE — 74019 RADEX ABDOMEN 2 VIEWS: CPT

## 2024-11-18 PROCEDURE — 76856 US EXAM PELVIC COMPLETE: CPT

## 2024-11-20 ENCOUNTER — ANCILLARY PROCEDURE (OUTPATIENT)
Dept: CARDIOLOGY | Facility: CLINIC | Age: 74
End: 2024-11-20
Attending: INTERNAL MEDICINE
Payer: COMMERCIAL

## 2024-11-20 ENCOUNTER — OFFICE VISIT (OUTPATIENT)
Dept: CARDIOLOGY | Facility: CLINIC | Age: 74
End: 2024-11-20
Attending: GENERAL ACUTE CARE HOSPITAL
Payer: COMMERCIAL

## 2024-11-20 VITALS
HEART RATE: 82 BPM | RESPIRATION RATE: 16 BRPM | OXYGEN SATURATION: 98 % | SYSTOLIC BLOOD PRESSURE: 160 MMHG | BODY MASS INDEX: 22.88 KG/M2 | WEIGHT: 131.2 LBS | DIASTOLIC BLOOD PRESSURE: 100 MMHG

## 2024-11-20 DIAGNOSIS — I10 ESSENTIAL HYPERTENSION: ICD-10-CM

## 2024-11-20 DIAGNOSIS — I49.5 SICK SINUS SYNDROME (H): ICD-10-CM

## 2024-11-20 DIAGNOSIS — Z95.0 CARDIAC PACEMAKER IN SITU: Primary | ICD-10-CM

## 2024-11-20 DIAGNOSIS — I42.8 NON-ISCHEMIC CARDIOMYOPATHY (H): ICD-10-CM

## 2024-11-20 DIAGNOSIS — Z95.0 CARDIAC PACEMAKER IN SITU: ICD-10-CM

## 2024-11-20 LAB
MDC_IDC_LEAD_CONNECTION_STATUS: NORMAL
MDC_IDC_LEAD_CONNECTION_STATUS: NORMAL
MDC_IDC_LEAD_IMPLANT_DT: NORMAL
MDC_IDC_LEAD_IMPLANT_DT: NORMAL
MDC_IDC_LEAD_LOCATION: NORMAL
MDC_IDC_LEAD_LOCATION: NORMAL
MDC_IDC_LEAD_MFG: NORMAL
MDC_IDC_LEAD_MFG: NORMAL
MDC_IDC_LEAD_MODEL: NORMAL
MDC_IDC_LEAD_MODEL: NORMAL
MDC_IDC_LEAD_POLARITY_TYPE: NORMAL
MDC_IDC_LEAD_POLARITY_TYPE: NORMAL
MDC_IDC_LEAD_SERIAL: NORMAL
MDC_IDC_LEAD_SERIAL: NORMAL
MDC_IDC_LEAD_SPECIAL_FUNCTION: NORMAL
MDC_IDC_LEAD_SPECIAL_FUNCTION: NORMAL
MDC_IDC_MSMT_BATTERY_DTM: NORMAL
MDC_IDC_MSMT_BATTERY_IMPEDANCE: 6374 OHM
MDC_IDC_MSMT_BATTERY_REMAINING_LONGEVITY: 1 MO
MDC_IDC_MSMT_BATTERY_STATUS: NORMAL
MDC_IDC_MSMT_BATTERY_VOLTAGE: 2.63 V
MDC_IDC_MSMT_LEADCHNL_RA_IMPEDANCE_VALUE: 915 OHM
MDC_IDC_MSMT_LEADCHNL_RA_PACING_THRESHOLD_AMPLITUDE: 0.75 V
MDC_IDC_MSMT_LEADCHNL_RA_PACING_THRESHOLD_AMPLITUDE: 0.75 V
MDC_IDC_MSMT_LEADCHNL_RA_PACING_THRESHOLD_PULSEWIDTH: 0.4 MS
MDC_IDC_MSMT_LEADCHNL_RA_PACING_THRESHOLD_PULSEWIDTH: 0.4 MS
MDC_IDC_MSMT_LEADCHNL_RA_SENSING_INTR_AMPL: 1.4 MV
MDC_IDC_MSMT_LEADCHNL_RV_IMPEDANCE_VALUE: 682 OHM
MDC_IDC_MSMT_LEADCHNL_RV_PACING_THRESHOLD_AMPLITUDE: 1.25 V
MDC_IDC_MSMT_LEADCHNL_RV_PACING_THRESHOLD_PULSEWIDTH: 1 MS
MDC_IDC_MSMT_LEADCHNL_RV_SENSING_INTR_AMPL: 15.67 MV
MDC_IDC_PG_IMPLANT_DTM: NORMAL
MDC_IDC_PG_MFG: NORMAL
MDC_IDC_PG_MODEL: NORMAL
MDC_IDC_PG_SERIAL: NORMAL
MDC_IDC_PG_TYPE: NORMAL
MDC_IDC_SESS_CLINIC_NAME: NORMAL
MDC_IDC_SESS_DTM: NORMAL
MDC_IDC_SESS_TYPE: NORMAL
MDC_IDC_SET_BRADY_AT_MODE_SWITCH_MODE: NORMAL
MDC_IDC_SET_BRADY_AT_MODE_SWITCH_RATE: 175 {BEATS}/MIN
MDC_IDC_SET_BRADY_LOWRATE: 60 {BEATS}/MIN
MDC_IDC_SET_BRADY_MAX_SENSOR_RATE: 130 {BEATS}/MIN
MDC_IDC_SET_BRADY_MAX_TRACKING_RATE: 130 {BEATS}/MIN
MDC_IDC_SET_BRADY_MODE: NORMAL
MDC_IDC_SET_BRADY_PAV_DELAY_LOW: 150 MS
MDC_IDC_SET_BRADY_SAV_DELAY_LOW: 120 MS
MDC_IDC_SET_LEADCHNL_RA_PACING_AMPLITUDE: 1.5 V
MDC_IDC_SET_LEADCHNL_RA_PACING_CAPTURE_MODE: NORMAL
MDC_IDC_SET_LEADCHNL_RA_PACING_POLARITY: NORMAL
MDC_IDC_SET_LEADCHNL_RA_PACING_PULSEWIDTH: 0.4 MS
MDC_IDC_SET_LEADCHNL_RA_SENSING_POLARITY: NORMAL
MDC_IDC_SET_LEADCHNL_RA_SENSING_SENSITIVITY: 0.5 MV
MDC_IDC_SET_LEADCHNL_RV_PACING_AMPLITUDE: 3 V
MDC_IDC_SET_LEADCHNL_RV_PACING_CAPTURE_MODE: NORMAL
MDC_IDC_SET_LEADCHNL_RV_PACING_POLARITY: NORMAL
MDC_IDC_SET_LEADCHNL_RV_PACING_PULSEWIDTH: 1 MS
MDC_IDC_SET_LEADCHNL_RV_SENSING_POLARITY: NORMAL
MDC_IDC_SET_LEADCHNL_RV_SENSING_SENSITIVITY: 5.6 MV
MDC_IDC_SET_ZONE_DETECTION_INTERVAL: 333.33 MS
MDC_IDC_SET_ZONE_DETECTION_INTERVAL: 342.86 MS
MDC_IDC_SET_ZONE_STATUS: NORMAL
MDC_IDC_SET_ZONE_STATUS: NORMAL
MDC_IDC_SET_ZONE_TYPE: NORMAL
MDC_IDC_SET_ZONE_TYPE: NORMAL
MDC_IDC_SET_ZONE_VENDOR_TYPE: NORMAL
MDC_IDC_SET_ZONE_VENDOR_TYPE: NORMAL
MDC_IDC_STAT_AT_BURDEN_PERCENT: 0 %
MDC_IDC_STAT_AT_DTM_END: NORMAL
MDC_IDC_STAT_AT_DTM_START: NORMAL
MDC_IDC_STAT_AT_MODE_SW_COUNT: 2
MDC_IDC_STAT_BRADY_AP_VP_PERCENT: 0 %
MDC_IDC_STAT_BRADY_AP_VS_PERCENT: 76 %
MDC_IDC_STAT_BRADY_AS_VP_PERCENT: 0 %
MDC_IDC_STAT_BRADY_AS_VS_PERCENT: 24 %
MDC_IDC_STAT_BRADY_DTM_END: NORMAL
MDC_IDC_STAT_BRADY_DTM_START: NORMAL
MDC_IDC_STAT_EPISODE_RECENT_COUNT: 0
MDC_IDC_STAT_EPISODE_RECENT_COUNT: 0
MDC_IDC_STAT_EPISODE_RECENT_COUNT_DTM_END: NORMAL
MDC_IDC_STAT_EPISODE_RECENT_COUNT_DTM_END: NORMAL
MDC_IDC_STAT_EPISODE_RECENT_COUNT_DTM_START: NORMAL
MDC_IDC_STAT_EPISODE_RECENT_COUNT_DTM_START: NORMAL
MDC_IDC_STAT_EPISODE_TYPE: NORMAL
MDC_IDC_STAT_EPISODE_TYPE: NORMAL

## 2024-11-20 PROCEDURE — G2211 COMPLEX E/M VISIT ADD ON: HCPCS | Performed by: GENERAL ACUTE CARE HOSPITAL

## 2024-11-20 PROCEDURE — 99214 OFFICE O/P EST MOD 30 MIN: CPT | Performed by: GENERAL ACUTE CARE HOSPITAL

## 2024-11-20 RX ORDER — POLYETHYLENE GLYCOL 3350 17 G/17G
1 POWDER, FOR SOLUTION ORAL DAILY PRN
COMMUNITY

## 2024-11-20 NOTE — LETTER
11/20/2024    Roz Acevedo MD  [id number] [facility] [address]    RE: Desi Vega       Dear Colleague,     I had the pleasure of seeing Desi Vega in the [facility].  HEART CARE ENCOUNTER NOTE        Assessment/Recommendations   Assessment:    Sick sinus syndrome status post Medtronic dual-chamber permanent pacemaker placement 5/18/1999 with a generator replacement 12/7/2011. Her device is approaching the elective replacement indicator..  Nonischemic cardiomyopathy with an overall stable left ventricular ejection fraction ranging mostly 40-50% over the past several years. Unclear etiology. No signs or symptoms of congestive heart failure.  Fatigue which strikes me as noncardiac.  Essential hypertension. Elevated today likely as a result of emotional stress, otherwise controlled.  History of provoked bilateral pulmonary emboli 9/22/2015 following right total knee arthroplasty.  History of gastric bypass in the 1990s.    Plan:  Transthoracic echocardiogram to reassess her left ventricular ejection fraction.  Once her pacemaker reaches the elective replacement indicator, she will be scheduled for generator replacement.  Continue losartan 25 mg daily.  We previously discussed beta blocker therapy for her cardiomyopathy. As his left ventricular ejection fraction has likely been stable and mostly above 40% without symptoms, we will forgo beta blocker therapy at this time.  Follow-up with me in 1 year.       The longitudinal plan of care for the diagnosis(es)/condition(s) as documented were addressed during this visit. Due to the added complexity in care, I will continue to support Desi in the subsequent management and with ongoing continuity of care.    History of Present Illness   Ms. Desi Vega is a 74 year old female with a significant past history of SSS s/p Medtronic dual-chamber PPM placement 5/18/1999 with a generator replacement 12/7/2011,  provoked bilateral PE 9/22/2015 following right total knee replacement, nonischemic cardiomyopathy, and HTN presenting for follow-up.    She has been having constipation issues and also feeling fatigued for the past several months. No chest pain/pressure/tightness, shortness of breath at rest or with exertion, light headedness/dizziness, pre-syncope, syncope, lower extremity swelling, palpitations, paroxysmal nocturnal dyspnea (PND), or orthopnea.    After learning that her pacemaker is near [place], she became very emotional. She is planning on spending winter in the [facility], leaving in January.     Cardiac Problems and Cardiac Diagnostics     Most Recent Cardiac testing:  ECG dated 7/7/2016 (personaly reviewed and interpreted): SR, normal ECG     Device interrogation 11/28/2023 (report reviewed):  Device: Medtronic, Adapta (D) PPM  Pacing %/Programmed: AP- 77.7%, - 0.1%, DDDR 60/130  Lead(s): Stable  Battery longevity: Estimating 23 months remaining.  Presenting rhythm: AS/VS @ 85 bpm  Underlying rhythm: SR @ 70's  Heart rates: Histograms show primarily 60-90 bpm.  Atrial arrhythmias: Since 2/6/2023- none  Anticoagulant: none  Ventricular arrhythmias: Since 2/6/2023- none  Comments: Normal device function. No changes made.     Holter monitor 1/27/2016 (report reviewed):  INTERPRETATION:  01/28/2016     INTERPRETATION:  Predominant rhythm is an atrial paced rhythm.  The patient has a normally functioning dual-chamber pacemaker.  Rare ventricular pacing with fusion is noted.  Rates ranged from 60 beats per minute to 140 beats per minute.  Rare atrial premature beats were noted, 12 over 24 hours.  Occasional ventricular premature beats were noted, 241 over 24 hours.  The patient noted shortness of breath associated with sinus rhythm, one at a rate of 117 beats per minute and the second at a rate of 70 beats per minute.     CONCLUSION:  Normally functioning dual-chamber pacemaker with predominant rhythm an atrial  paced rhythm, otherwise normal Holter.    ECHO 6/12/2023 (report reviewed):   Left ventricular function is decreased. The ejection fraction is 35-40% (moderately reduced).  Normal right ventricle size and systolic function.  No hemodynamically significant valvular abnormalities on 2D or color flow imaging.     Stress test 7/11/2023 (report reviewed):      Lexiscan stress ECG negative for ischemia.     The nuclear stress test is abnormal.  There is a small area of fixed defect involving the mid to distal anterior to anterolateral wall suggestive of nontransmural infarction.  No ischemia.     The left ventricular ejection fraction at stress is 56% with mild hypokinesis of the distal anterolateral wall.     There is no prior study for comparison.     Abdominal ultrasound 12/2/2021 (report reviewed):  No aortoiliac aneurysm.      Abdominal ultrasound 111/26/2014 (report reviewed):  No aortoiliac aneurysm.      CTA chest 9/22/2015 (report reviewed):  Bilateral pulmonary emboli.      CT coronary angiogram 8/1/2023 (report reviewed):      Right coronary artery dominant.  No observed significant plaque or stenosis within the visualized coronary tree.     Pacemaker catheter in the right heart     Please see separate report radiology for additional findings.     Medications  Allergies   Current Outpatient Medications   Medication Sig Dispense Refill     alendronate (FOSAMAX) 70 MG tablet Take 1 tablet (70 mg) by mouth every 7 days 12 tablet 4     biotin 2.5 MG CAPS Take 1 capsule by mouth daily        CALCIUM PO Take 1,200 mg by mouth daily       ketoconazole (NIZORAL) 2 % external cream Apply topically as needed.       losartan (COZAAR) 25 MG tablet Take 1 tablet (25 mg) by mouth daily 100 tablet 3     Lutein 20 MG CAPS Take 20 mg by mouth daily        magnesium 250 MG tablet Take 1 tablet by mouth daily       Melatonin 10 MG CHEW Take 4 tablets by mouth daily.       Multiple Vitamins-Minerals (ONCOVITE) TABS Take 1 tablet  by mouth daily        polyethylene glycol (MIRALAX) 17 GM/Dose powder Take 1 Capful by mouth daily as needed for constipation.       triamcinolone (KENALOG) 0.5 % external ointment Use twice daily to dermatitis on arm 30 g 3     vitamin D3 (CHOLECALCIFEROL) 125 MCG (5000 UT) tablet Take 5,000 Units by mouth daily        Zinc 20 MG CAPS Take 1 capsule by mouth daily       ketoconazole (NIZORAL) 2 % external cream Apply topically daily To area between toes 90 g 3     Probiotic Product (VISBIOME PROBIOTIC HIGH POT PO)        triamcinolone (KENALOG) 0.1 % external ointment Apply topically 2 times daily To shin 240 g 3      Allergies   Allergen Reactions     Aspirin Swelling     Bee Venom Swelling     Throat swelling, hands swelling     Fire Ant         Physical Examination Review of Systems   BP (!) 160/100 (BP Location: Right arm, Patient Position: Sitting, Cuff Size: Adult Regular)   Pulse 82   Resp 16   Wt 59.5 kg (131 lb 3.2 oz)   LMP  (LMP Unknown)   SpO2 98%   BMI 22.88 kg/m    Body mass index is 22.88 kg/m .  Wt Readings from Last 3 Encounters:   11/20/24 59.5 kg (131 lb 3.2 oz)   01/31/24 56.9 kg (125 lb 8 oz)   11/28/23 59.4 kg (131 lb)       General Appearance:   Pleasant female, appears stated age. Tearful, normal body habitus   ENT/Mouth: membranes moist, no apparent gingival bleeding.      EYES:  no scleral icterus, normal conjunctivae   Neck: no carotid bruits. No anterior cervical lymphadenopaty   Respiratory:   lungs are clear to auscultation, no rales or wheezing, equal chest wall expansion    Cardiovascular:   Regular rhythm, normal rate. Normal first and second heart sounds with no murmurs, rubs, or gallops; the carotid, radial and posterior tibial pulses are intact, Jugular venous pressure normal, no edema bilaterally    Abdomen/GI:  no organomegaly, masses, bruits, or tenderness; bowel sounds are present   Extremities: no cyanosis or clubbing   Skin: no xanthelasma, warm.    Heme/lymph/  Immunology No apparent bleeding noted.   Neurologic: Alert and oriented. normal gait, no tremors     Psychiatric: Pleasant, calm, appropriate affect.    A complete 10 system review of systems was performed and is negative except as mentioned in the HPI/subjective.         Past History   Past Medical History:   Past Medical History:   Diagnosis Date     Anhedonia      Arthritis      Diarrhea     thought to be due to gastric bypass     Grief reaction      History of palpitations      Hyperlipidemia      Insomnia      Osteopenia      Osteopenia      Pulmonary embolism (H) 09/22/2015     Shortness of breath      Sick sinus syndrome (H)     bradycardia - pacemaker placed     TGA (transient global amnesia) 01/01/2004    Transient global amnesia - on plavix     Tinnitus      Urinary frequency     with some leakage       Past Surgical History:   Past Surgical History:   Procedure Laterality Date     ANKLE SURGERY       CHOLECYSTECTOMY       GASTRIC BYPASS       HC CORRECT BUNION,DOUBLE OSTEOTOMY      Description: Hallux Valgus (Bunion) Correction;  Proc Date: 01/01/2004;     HC REMOVAL GALLBLADDER      Description: Cholecystectomy;  Proc Date: 01/01/1987;     HEMORRHOIDECTOMY INTERNAL LIGATION      Description: Hemorrhoidectomy;  Proc Date: 01/01/2012;     HYSTERECTOMY  1988     IMPLANT PACEMAKER  1999     JOINT REPLACEMENT Left      MAMMOPLASTY REDUCTION Bilateral 1997     [facility] LAP,URETHRAL SUSPENSION      Description: Laparoscopic Urethral Suspension For Stress Incontinence;  Proc Date: 01/01/2002;     TOTAL KNEE ARTHROPLASTY Right 9/10/2015    Done by Dr. Gutierrez, [facility]     [facility] REPAIR OF RECTOCELE      Description: Rectocele Repair;  Proc Date: 01/01/2002;     [facility] TOTAL ABDOM HYSTERECTOMY      Description: Hysterectomy;  Proc Date: 01/01/1988;  Comments: for menorrhagia, still has ovaries     [facility] OPEN TREATMENT PROXIMAL FIBULA/SHAFT FRACTURE      Description: Open Treatment Of Fracture Of Fibular Shaft;   Recorded: 06/11/2013;       Family History:   Family History   Problem Relation Age of Onset     Coronary Artery Disease Father      Diabetes Maternal Grandmother      Diabetes Brother         Social History:   Social History     Socioeconomic History     Marital status:      Spouse name: Not on file     Number of children: Not on file     Years of education: Not on file     Highest education level: Not on file   Occupational History     Not on file   Tobacco Use     Smoking status: Never     Smokeless tobacco: Never   Substance and Sexual Activity     Alcohol use: Not on file     Drug use: Not on file     Sexual activity: Not on file   Other Topics Concern     Not on file   Social History Narrative     Not on file     Social Drivers of Health     Financial Resource Strain: Low Risk  (1/24/2024)    Financial Resource Strain      Within the past 12 months, have you or your family members you live with been unable to get utilities (heat, electricity) when it was really needed?: No   Food Insecurity: Low Risk  (1/24/2024)    Food Insecurity      Within the past 12 months, did you worry that your food would run out before you got money to buy more?: No      Within the past 12 months, did the food you bought just not last and you didn t have money to get more?: No   Transportation Needs: Low Risk  (1/24/2024)    Transportation Needs      Within the past 12 months, has lack of transportation kept you from medical appointments, getting your medicines, non-medical meetings or appointments, work, or from getting things that you need?: No   Physical Activity: Inactive (11/8/2021)    Exercise Vital Sign      Days of Exercise per Week: 0 days      Minutes of Exercise per Session: 0 min   Stress: No Stress Concern Present (11/8/2021)    [employer] [employer] - Occupational Stress Questionnaire      Feeling of Stress : Only a little   Social Connections: Moderately Isolated (11/8/2021)    Social  Connection and Isolation Panel [NHANES]      Frequency of Communication with Friends and Family: Twice a week      Frequency of Social Gatherings with Friends and Family: Twice a week      Attends [employer] Services: More than 4 times per year      Active Member of Clubs or Organizations: No      Attends Club or Organization Meetings: Not on file      Marital Status:    Interpersonal Safety: Low Risk  (1/31/2024)    Interpersonal Safety      Do you feel physically and emotionally safe where you currently live?: Yes      Within the past 12 months, have you been hit, slapped, kicked or otherwise physically hurt by someone?: No      Within the past 12 months, have you been humiliated or emotionally abused in other ways by your partner or ex-partner?: No   Housing Stability: Low Risk  (1/24/2024)    Housing Stability      Do you have housing? : Yes      Are you worried about losing your housing?: No              Lab Results    Chemistry/lipid CBC Cardiac Enzymes/BNP/TSH/INR   Lab Results   Component Value Date    CHOL 200 (H) 01/22/2024     01/22/2024    LDL 81 01/22/2024    TRIG 79 01/22/2024    CR 0.98 (H) 01/22/2024    BUN 20.9 01/22/2024    POTASSIUM 4.0 01/22/2024     01/22/2024    CO2 26 01/22/2024      Lab Results   Component Value Date    WBC 9.5 09/06/2023    HGB 12.8 09/06/2023    HCT 39.2 09/06/2023    MCV 98 09/06/2023     09/06/2023    A1C 5.2 01/22/2024     Lab Results   Component Value Date    A1C 5.2 01/22/2024    Lab Results   Component Value Date    NTBNP 422 07/11/2023    TSH 2.16 01/22/2024          Bhavik Esposito MD [facility]  Non-Invasive Cardiologist  [facility]  Pager [phone]      Thank you for allowing me to participate in the care of your patient.      Sincerely,     Bhavik Esposito MD     [facility]  cc:   Bhavik Esposito MD  [address] [address]

## 2024-11-20 NOTE — PROGRESS NOTES
HEART CARE ENCOUNTER NOTE        Assessment/Recommendations   Assessment:    Sick sinus syndrome status post Medtronic dual-chamber permanent pacemaker placement 5/18/1999 with a generator replacement 12/7/2011. Her device is approaching the elective replacement indicator..  Nonischemic cardiomyopathy with an overall stable left ventricular ejection fraction ranging mostly 40-50% over the past several years. Unclear etiology. No signs or symptoms of congestive heart failure.  Fatigue which strikes me as noncardiac.  Essential hypertension. Elevated today likely as a result of emotional stress, otherwise controlled.  History of provoked bilateral pulmonary emboli 9/22/2015 following right total knee arthroplasty.  History of gastric bypass in the 1990s.    Plan:  Transthoracic echocardiogram to reassess her left ventricular ejection fraction.  Once her pacemaker reaches the elective replacement indicator, she will be scheduled for generator replacement.  Continue losartan 25 mg daily.  We previously discussed beta blocker therapy for her cardiomyopathy. As his left ventricular ejection fraction has likely been stable and mostly above 40% without symptoms, we will forgo beta blocker therapy at this time.  Follow-up with me in 1 year.       The longitudinal plan of care for the diagnosis(es)/condition(s) as documented were addressed during this visit. Due to the added complexity in care, I will continue to support Desi in the subsequent management and with ongoing continuity of care.    History of Present Illness   Ms. Desi Vega is a 74 year old female with a significant past history of SSS s/p Medtronic dual-chamber PPM placement 5/18/1999 with a generator replacement 12/7/2011, provoked bilateral PE 9/22/2015 following right total knee replacement, nonischemic cardiomyopathy, and HTN presenting for follow-up.    She has been having constipation issues and also feeling fatigued for the past several  months. No chest pain/pressure/tightness, shortness of breath at rest or with exertion, light headedness/dizziness, pre-syncope, syncope, lower extremity swelling, palpitations, paroxysmal nocturnal dyspnea (PND), or orthopnea.    After learning that her pacemaker is near [place], she became very emotional. She is planning on spending winter in the [facility], leaving in January.     Cardiac Problems and Cardiac Diagnostics     Most Recent Cardiac testing:  ECG dated 7/7/2016 (personaly reviewed and interpreted): SR, normal ECG     Device interrogation 11/28/2023 (report reviewed):  Device: Medtronic, Adapta (D) PPM  Pacing %/Programmed: AP- 77.7%, - 0.1%, DDDR 60/130  Lead(s): Stable  Battery longevity: Estimating 23 months remaining.  Presenting rhythm: AS/VS @ 85 bpm  Underlying rhythm: SR @ 70's  Heart rates: Histograms show primarily 60-90 bpm.  Atrial arrhythmias: Since 2/6/2023- none  Anticoagulant: none  Ventricular arrhythmias: Since 2/6/2023- none  Comments: Normal device function. No changes made.     Holter monitor 1/27/2016 (report reviewed):  INTERPRETATION:  01/28/2016     INTERPRETATION:  Predominant rhythm is an atrial paced rhythm.  The patient has a normally functioning dual-chamber pacemaker.  Rare ventricular pacing with fusion is noted.  Rates ranged from 60 beats per minute to 140 beats per minute.  Rare atrial premature beats were noted, 12 over 24 hours.  Occasional ventricular premature beats were noted, 241 over 24 hours.  The patient noted shortness of breath associated with sinus rhythm, one at a rate of 117 beats per minute and the second at a rate of 70 beats per minute.     CONCLUSION:  Normally functioning dual-chamber pacemaker with predominant rhythm an atrial paced rhythm, otherwise normal Holter.    ECHO 6/12/2023 (report reviewed):   Left ventricular function is decreased. The ejection fraction is 35-40% (moderately reduced).  Normal right ventricle size and systolic function.  No  hemodynamically significant valvular abnormalities on 2D or color flow imaging.     Stress test 7/11/2023 (report reviewed):     Lexiscan stress ECG negative for ischemia.    The nuclear stress test is abnormal.  There is a small area of fixed defect involving the mid to distal anterior to anterolateral wall suggestive of nontransmural infarction.  No ischemia.    The left ventricular ejection fraction at stress is 56% with mild hypokinesis of the distal anterolateral wall.    There is no prior study for comparison.     Abdominal ultrasound 12/2/2021 (report reviewed):  No aortoiliac aneurysm.      Abdominal ultrasound 111/26/2014 (report reviewed):  No aortoiliac aneurysm.      CTA chest 9/22/2015 (report reviewed):  Bilateral pulmonary emboli.      CT coronary angiogram 8/1/2023 (report reviewed):     Right coronary artery dominant.  No observed significant plaque or stenosis within the visualized coronary tree.    Pacemaker catheter in the right heart    Please see separate report radiology for additional findings.     Medications  Allergies   Current Outpatient Medications   Medication Sig Dispense Refill    alendronate (FOSAMAX) 70 MG tablet Take 1 tablet (70 mg) by mouth every 7 days 12 tablet 4    biotin 2.5 MG CAPS Take 1 capsule by mouth daily       CALCIUM PO Take 1,200 mg by mouth daily      ketoconazole (NIZORAL) 2 % external cream Apply topically as needed.      losartan (COZAAR) 25 MG tablet Take 1 tablet (25 mg) by mouth daily 100 tablet 3    Lutein 20 MG CAPS Take 20 mg by mouth daily       magnesium 250 MG tablet Take 1 tablet by mouth daily      Melatonin 10 MG CHEW Take 4 tablets by mouth daily.      Multiple Vitamins-Minerals (ONCOVITE) TABS Take 1 tablet by mouth daily       polyethylene glycol (MIRALAX) 17 GM/Dose powder Take 1 Capful by mouth daily as needed for constipation.      triamcinolone (KENALOG) 0.5 % external ointment Use twice daily to dermatitis on arm 30 g 3    vitamin D3  (CHOLECALCIFEROL) 125 MCG (5000 UT) tablet Take 5,000 Units by mouth daily       Zinc 20 MG CAPS Take 1 capsule by mouth daily      ketoconazole (NIZORAL) 2 % external cream Apply topically daily To area between toes 90 g 3    Probiotic Product (VISBIOME PROBIOTIC HIGH POT PO)       triamcinolone (KENALOG) 0.1 % external ointment Apply topically 2 times daily To shin 240 g 3      Allergies   Allergen Reactions    Aspirin Swelling    Bee Venom Swelling     Throat swelling, hands swelling    Fire Ant         Physical Examination Review of Systems   BP (!) 160/100 (BP Location: Right arm, Patient Position: Sitting, Cuff Size: Adult Regular)   Pulse 82   Resp 16   Wt 59.5 kg (131 lb 3.2 oz)   LMP  (LMP Unknown)   SpO2 98%   BMI 22.88 kg/m    Body mass index is 22.88 kg/m .  Wt Readings from Last 3 Encounters:   11/20/24 59.5 kg (131 lb 3.2 oz)   01/31/24 56.9 kg (125 lb 8 oz)   11/28/23 59.4 kg (131 lb)       General Appearance:   Pleasant female, appears stated age. Tearful, normal body habitus   ENT/Mouth: membranes moist, no apparent gingival bleeding.      EYES:  no scleral icterus, normal conjunctivae   Neck: no carotid bruits. No anterior cervical lymphadenopaty   Respiratory:   lungs are clear to auscultation, no rales or wheezing, equal chest wall expansion    Cardiovascular:   Regular rhythm, normal rate. Normal first and second heart sounds with no murmurs, rubs, or gallops; the carotid, radial and posterior tibial pulses are intact, Jugular venous pressure normal, no edema bilaterally    Abdomen/GI:  no organomegaly, masses, bruits, or tenderness; bowel sounds are present   Extremities: no cyanosis or clubbing   Skin: no xanthelasma, warm.    Heme/lymph/ Immunology No apparent bleeding noted.   Neurologic: Alert and oriented. normal gait, no tremors     Psychiatric: Pleasant, calm, appropriate affect.    A complete 10 system review of systems was performed and is negative except as mentioned in the  HPI/subjective.         Past History   Past Medical History:   Past Medical History:   Diagnosis Date    Anhedonia     Arthritis     Diarrhea     thought to be due to gastric bypass    Grief reaction     History of palpitations     Hyperlipidemia     Insomnia     Osteopenia     Osteopenia     Pulmonary embolism (H) 09/22/2015    Shortness of breath     Sick sinus syndrome (H)     bradycardia - pacemaker placed    TGA (transient global amnesia) 01/01/2004    Transient global amnesia - on plavix    Tinnitus     Urinary frequency     with some leakage       Past Surgical History:   Past Surgical History:   Procedure Laterality Date    ANKLE SURGERY      CHOLECYSTECTOMY      GASTRIC BYPASS      HC CORRECT BUNION,DOUBLE OSTEOTOMY      Description: Hallux Valgus (Bunion) Correction;  Proc Date: 01/01/2004;    HC REMOVAL GALLBLADDER      Description: Cholecystectomy;  Proc Date: 01/01/1987;    HEMORRHOIDECTOMY INTERNAL LIGATION      Description: Hemorrhoidectomy;  Proc Date: 01/01/2012;    HYSTERECTOMY  1988    IMPLANT PACEMAKER  1999    JOINT REPLACEMENT Left     MAMMOPLASTY REDUCTION Bilateral 1997    ID LAP,URETHRAL SUSPENSION      Description: Laparoscopic Urethral Suspension For Stress Incontinence;  Proc Date: 01/01/2002;    TOTAL KNEE ARTHROPLASTY Right 9/10/2015    Done by Dr. Gutierrez, [facility]    [facility] REPAIR OF RECTOCELE      Description: Rectocele Repair;  Proc Date: 01/01/2002;    [facility] TOTAL ABDOM HYSTERECTOMY      Description: Hysterectomy;  Proc Date: 01/01/1988;  Comments: for menorrhagia, still has ovaries    [facility] OPEN TREATMENT PROXIMAL FIBULA/SHAFT FRACTURE      Description: Open Treatment Of Fracture Of Fibular Shaft;  Recorded: 06/11/2013;       Family History:   Family History   Problem Relation Age of Onset    Coronary Artery Disease Father     Diabetes Maternal Grandmother     Diabetes Brother         Social History:   Social History     Socioeconomic History    Marital status:       Spouse name: Not on file    Number of children: Not on file    Years of education: Not on file    Highest education level: Not on file   Occupational History    Not on file   Tobacco Use    Smoking status: Never    Smokeless tobacco: Never   Substance and Sexual Activity    Alcohol use: Not on file    Drug use: Not on file    Sexual activity: Not on file   Other Topics Concern    Not on file   Social History Narrative    Not on file     Social Drivers of Health     Financial Resource Strain: Low Risk  (1/24/2024)    Financial Resource Strain     Within the past 12 months, have you or your family members you live with been unable to get utilities (heat, electricity) when it was really needed?: No   Food Insecurity: Low Risk  (1/24/2024)    Food Insecurity     Within the past 12 months, did you worry that your food would run out before you got money to buy more?: No     Within the past 12 months, did the food you bought just not last and you didn t have money to get more?: No   Transportation Needs: Low Risk  (1/24/2024)    Transportation Needs     Within the past 12 months, has lack of transportation kept you from medical appointments, getting your medicines, non-medical meetings or appointments, work, or from getting things that you need?: No   Physical Activity: Inactive (11/8/2021)    Exercise Vital Sign     Days of Exercise per Week: 0 days     Minutes of Exercise per Session: 0 min   Stress: No Stress Concern Present (11/8/2021)    [employer] - Occupational Stress Questionnaire     Feeling of Stress : Only a little   Social Connections: Moderately Isolated (11/8/2021)    Social Connection and Isolation Panel [NHANES]     Frequency of Communication with Friends and Family: Twice a week     Frequency of Social Gatherings with Friends and Family: Twice a week     Attends Confucianism Services: More than 4 times per year     Active Member of Clubs or Organizations: No     Attends Club or  Organization Meetings: Not on file     Marital Status:    Interpersonal Safety: Low Risk  (1/31/2024)    Interpersonal Safety     Do you feel physically and emotionally safe where you currently live?: Yes     Within the past 12 months, have you been hit, slapped, kicked or otherwise physically hurt by someone?: No     Within the past 12 months, have you been humiliated or emotionally abused in other ways by your partner or ex-partner?: No   Housing Stability: Low Risk  (1/24/2024)    Housing Stability     Do you have housing? : Yes     Are you worried about losing your housing?: No              Lab Results    Chemistry/lipid CBC Cardiac Enzymes/BNP/TSH/INR   Lab Results   Component Value Date    CHOL 200 (H) 01/22/2024     01/22/2024    LDL 81 01/22/2024    TRIG 79 01/22/2024    CR 0.98 (H) 01/22/2024    BUN 20.9 01/22/2024    POTASSIUM 4.0 01/22/2024     01/22/2024    CO2 26 01/22/2024      Lab Results   Component Value Date    WBC 9.5 09/06/2023    HGB 12.8 09/06/2023    HCT 39.2 09/06/2023    MCV 98 09/06/2023     09/06/2023    A1C 5.2 01/22/2024     Lab Results   Component Value Date    A1C 5.2 01/22/2024    Lab Results   Component Value Date    NTBNP 422 07/11/2023    TSH 2.16 01/22/2024          Bhavik Esposito MD [facility]  Non-Invasive Cardiologist  [facility]  Pager [phone]

## 2024-11-20 NOTE — PATIENT INSTRUCTIONS
We will schedule an echocardiogram and let me know when your pacemaker battery needs to be replaced.  See me back in 1 year.

## 2024-11-27 ENCOUNTER — TRANSFERRED RECORDS (OUTPATIENT)
Dept: HEALTH INFORMATION MANAGEMENT | Facility: CLINIC | Age: 74
End: 2024-11-27
Payer: COMMERCIAL

## 2024-12-03 ENCOUNTER — ANCILLARY PROCEDURE (OUTPATIENT)
Dept: CARDIOLOGY | Facility: CLINIC | Age: 74
End: 2024-12-03
Attending: INTERNAL MEDICINE
Payer: COMMERCIAL

## 2024-12-03 ENCOUNTER — TELEPHONE (OUTPATIENT)
Dept: CARDIOLOGY | Facility: CLINIC | Age: 74
End: 2024-12-03

## 2024-12-03 DIAGNOSIS — I49.5 SICK SINUS SYNDROME (H): ICD-10-CM

## 2024-12-03 DIAGNOSIS — Z95.0 CARDIAC PACEMAKER IN SITU: ICD-10-CM

## 2024-12-03 LAB
MDC_IDC_LEAD_CONNECTION_STATUS: NORMAL
MDC_IDC_LEAD_CONNECTION_STATUS: NORMAL
MDC_IDC_LEAD_IMPLANT_DT: NORMAL
MDC_IDC_LEAD_IMPLANT_DT: NORMAL
MDC_IDC_LEAD_LOCATION: NORMAL
MDC_IDC_LEAD_LOCATION: NORMAL
MDC_IDC_LEAD_MFG: NORMAL
MDC_IDC_LEAD_MFG: NORMAL
MDC_IDC_LEAD_MODEL: NORMAL
MDC_IDC_LEAD_MODEL: NORMAL
MDC_IDC_LEAD_POLARITY_TYPE: NORMAL
MDC_IDC_LEAD_POLARITY_TYPE: NORMAL
MDC_IDC_LEAD_SERIAL: NORMAL
MDC_IDC_LEAD_SERIAL: NORMAL
MDC_IDC_LEAD_SPECIAL_FUNCTION: NORMAL
MDC_IDC_LEAD_SPECIAL_FUNCTION: NORMAL
MDC_IDC_MSMT_BATTERY_DTM: NORMAL
MDC_IDC_MSMT_BATTERY_IMPEDANCE: 7381 OHM
MDC_IDC_MSMT_BATTERY_STATUS: NORMAL
MDC_IDC_MSMT_BATTERY_VOLTAGE: 2.58 V
MDC_IDC_MSMT_LEADCHNL_RA_IMPEDANCE_VALUE: 67 OHM
MDC_IDC_MSMT_LEADCHNL_RV_IMPEDANCE_VALUE: 728 OHM
MDC_IDC_PG_IMPLANT_DTM: NORMAL
MDC_IDC_PG_MFG: NORMAL
MDC_IDC_PG_MODEL: NORMAL
MDC_IDC_PG_SERIAL: NORMAL
MDC_IDC_PG_TYPE: NORMAL
MDC_IDC_SESS_CLINIC_NAME: NORMAL
MDC_IDC_SESS_DTM: NORMAL
MDC_IDC_SESS_TYPE: NORMAL
MDC_IDC_SET_BRADY_HYSTRATE: NORMAL
MDC_IDC_SET_BRADY_LOWRATE: 65 {BEATS}/MIN
MDC_IDC_SET_BRADY_MAX_TRACKING_RATE: 105 {BEATS}/MIN
MDC_IDC_SET_BRADY_MODE: NORMAL
MDC_IDC_SET_LEADCHNL_RV_PACING_AMPLITUDE: 3 V
MDC_IDC_SET_LEADCHNL_RV_PACING_CAPTURE_MODE: NORMAL
MDC_IDC_SET_LEADCHNL_RV_PACING_POLARITY: NORMAL
MDC_IDC_SET_LEADCHNL_RV_PACING_PULSEWIDTH: 1 MS
MDC_IDC_SET_LEADCHNL_RV_SENSING_POLARITY: NORMAL
MDC_IDC_SET_LEADCHNL_RV_SENSING_SENSITIVITY: 5.6 MV
MDC_IDC_SET_ZONE_DETECTION_INTERVAL: 333.33 MS
MDC_IDC_SET_ZONE_STATUS: NORMAL
MDC_IDC_SET_ZONE_STATUS: NORMAL
MDC_IDC_SET_ZONE_TYPE: NORMAL
MDC_IDC_SET_ZONE_TYPE: NORMAL
MDC_IDC_SET_ZONE_VENDOR_TYPE: NORMAL
MDC_IDC_SET_ZONE_VENDOR_TYPE: NORMAL
MDC_IDC_STAT_AT_BURDEN_PERCENT: 0 %
MDC_IDC_STAT_AT_DTM_END: NORMAL
MDC_IDC_STAT_AT_DTM_START: NORMAL
MDC_IDC_STAT_AT_MODE_SW_COUNT: 0
MDC_IDC_STAT_BRADY_DTM_END: NORMAL
MDC_IDC_STAT_BRADY_DTM_START: NORMAL
MDC_IDC_STAT_BRADY_RV_PERCENT_PACED: 0 %
MDC_IDC_STAT_EPISODE_RECENT_COUNT: 0
MDC_IDC_STAT_EPISODE_RECENT_COUNT: 0
MDC_IDC_STAT_EPISODE_RECENT_COUNT_DTM_END: NORMAL
MDC_IDC_STAT_EPISODE_RECENT_COUNT_DTM_END: NORMAL
MDC_IDC_STAT_EPISODE_RECENT_COUNT_DTM_START: NORMAL
MDC_IDC_STAT_EPISODE_RECENT_COUNT_DTM_START: NORMAL
MDC_IDC_STAT_EPISODE_TYPE: NORMAL
MDC_IDC_STAT_EPISODE_TYPE: NORMAL

## 2024-12-03 NOTE — TELEPHONE ENCOUNTER
Heart Care Device Change-Out Checklist (JOEY Checklist)    Device Data  Pacemaker and Dual    :  Medtronic  Model:  Medtronic ADDR01 Adapta  Serial Number:  [id number]  Implant location: Left Chest    Implant Date: 12/7/2011  JOEY Date:  11/23/24  Device Diagnosis:  Sick Sinus Syndrome    Device Alert(s):  No    Lead Data  (Attach Device History)        Last Interrogation Date: 11/20/24    Atrium: Vitatron [id number] [place]+   Lead Imp:  915 Ohms, Pacing Threshold:  0.75 V @ 0.4 ms, and Sensing Threshold:  1.4 mV    Right Ventricle: Vitatron [id number] Excellence PS+   Lead Imp:  682 Ohms, Pacing Threshold:  1.25 V @ 1.0 ms, and Sensing Threshold:  15.7 mV      Old Leads Present/Abandoned: No    Lead Alert(s):  No    Lead Issues/Concerns: 6/25/13 it was noted on CXR that RV lead is probably pulled back. Per Dr. Lepe, continue routine follow-up and replace when it fails. RV lead stable per 11/23/24 clinic check and RV pacing <1%.    Diagnostic Information  Intrinsic Rhythm:  SB 50bpm    Atrial Fibrillation:  No  Takes Anticoagulant or LAAO? No    Pacing Percentages  Atrial Pacing 76% and Ventricle Pacing 0%  Pacemaker Dependent? No    History of VT/VF therapy    ATP: No  Appropriate Shocks:  NA    Ejection Fraction  Last EF Date:  6/12/23    By Echocardiogram  Last EF Measurement:  35-40%      Special Instructions/Timeframe for change-out:  Due by 2/23/25, but patient requesting to be done ASAP because she is symptomatic and [place]. JOEY programming switched mode to VVI 65bpm. Patient reports extreme fatigue and having dizzy/lightheaded episodes.     Routed to EP:  Dr. Menjivar    Device RN: Sneha Quinones RN

## 2024-12-04 ENCOUNTER — TELEPHONE (OUTPATIENT)
Dept: CARDIOLOGY | Facility: CLINIC | Age: 74
End: 2024-12-04

## 2024-12-04 ENCOUNTER — DOCUMENTATION ONLY (OUTPATIENT)
Dept: CARDIOLOGY | Facility: CLINIC | Age: 74
End: 2024-12-04
Payer: COMMERCIAL

## 2024-12-04 ENCOUNTER — ANCILLARY PROCEDURE (OUTPATIENT)
Dept: CARDIOLOGY | Facility: CLINIC | Age: 74
End: 2024-12-04
Attending: INTERNAL MEDICINE
Payer: COMMERCIAL

## 2024-12-04 DIAGNOSIS — Z95.0 CARDIAC PACEMAKER IN SITU: ICD-10-CM

## 2024-12-04 DIAGNOSIS — I49.5 SICK SINUS SYNDROME (H): Primary | ICD-10-CM

## 2024-12-04 DIAGNOSIS — I49.5 SICK SINUS SYNDROME (H): ICD-10-CM

## 2024-12-04 LAB
MDC_IDC_LEAD_CONNECTION_STATUS: NORMAL
MDC_IDC_LEAD_CONNECTION_STATUS: NORMAL
MDC_IDC_LEAD_IMPLANT_DT: NORMAL
MDC_IDC_LEAD_IMPLANT_DT: NORMAL
MDC_IDC_LEAD_LOCATION: NORMAL
MDC_IDC_LEAD_LOCATION: NORMAL
MDC_IDC_LEAD_MFG: NORMAL
MDC_IDC_LEAD_MFG: NORMAL
MDC_IDC_LEAD_MODEL: NORMAL
MDC_IDC_LEAD_MODEL: NORMAL
MDC_IDC_LEAD_POLARITY_TYPE: NORMAL
MDC_IDC_LEAD_POLARITY_TYPE: NORMAL
MDC_IDC_LEAD_SERIAL: NORMAL
MDC_IDC_LEAD_SERIAL: NORMAL
MDC_IDC_LEAD_SPECIAL_FUNCTION: NORMAL
MDC_IDC_LEAD_SPECIAL_FUNCTION: NORMAL
MDC_IDC_MSMT_BATTERY_DTM: NORMAL
MDC_IDC_MSMT_BATTERY_IMPEDANCE: 7068 OHM
MDC_IDC_MSMT_BATTERY_STATUS: NORMAL
MDC_IDC_MSMT_BATTERY_VOLTAGE: 2.59 V
MDC_IDC_MSMT_LEADCHNL_RA_IMPEDANCE_VALUE: 67 OHM
MDC_IDC_MSMT_LEADCHNL_RV_IMPEDANCE_VALUE: 641 OHM
MDC_IDC_PG_IMPLANT_DTM: NORMAL
MDC_IDC_PG_MFG: NORMAL
MDC_IDC_PG_MODEL: NORMAL
MDC_IDC_PG_SERIAL: NORMAL
MDC_IDC_PG_TYPE: NORMAL
MDC_IDC_SESS_CLINIC_NAME: NORMAL
MDC_IDC_SESS_DTM: NORMAL
MDC_IDC_SESS_TYPE: NORMAL
MDC_IDC_SET_BRADY_HYSTRATE: NORMAL
MDC_IDC_SET_BRADY_LOWRATE: 65 {BEATS}/MIN
MDC_IDC_SET_BRADY_MAX_TRACKING_RATE: 105 {BEATS}/MIN
MDC_IDC_SET_BRADY_MODE: NORMAL
MDC_IDC_SET_LEADCHNL_RV_PACING_AMPLITUDE: 3 V
MDC_IDC_SET_LEADCHNL_RV_PACING_CAPTURE_MODE: NORMAL
MDC_IDC_SET_LEADCHNL_RV_PACING_POLARITY: NORMAL
MDC_IDC_SET_LEADCHNL_RV_PACING_PULSEWIDTH: 1 MS
MDC_IDC_SET_LEADCHNL_RV_SENSING_POLARITY: NORMAL
MDC_IDC_SET_LEADCHNL_RV_SENSING_SENSITIVITY: 5.6 MV
MDC_IDC_SET_ZONE_DETECTION_INTERVAL: 333.33 MS
MDC_IDC_SET_ZONE_STATUS: NORMAL
MDC_IDC_SET_ZONE_STATUS: NORMAL
MDC_IDC_SET_ZONE_TYPE: NORMAL
MDC_IDC_SET_ZONE_TYPE: NORMAL
MDC_IDC_SET_ZONE_VENDOR_TYPE: NORMAL
MDC_IDC_SET_ZONE_VENDOR_TYPE: NORMAL
MDC_IDC_STAT_AT_BURDEN_PERCENT: 0 %
MDC_IDC_STAT_AT_DTM_END: NORMAL
MDC_IDC_STAT_AT_DTM_START: NORMAL
MDC_IDC_STAT_AT_MODE_SW_COUNT: 0
MDC_IDC_STAT_BRADY_DTM_END: NORMAL
MDC_IDC_STAT_BRADY_DTM_START: NORMAL
MDC_IDC_STAT_BRADY_RV_PERCENT_PACED: 0 %
MDC_IDC_STAT_EPISODE_RECENT_COUNT: 0
MDC_IDC_STAT_EPISODE_RECENT_COUNT: 0
MDC_IDC_STAT_EPISODE_RECENT_COUNT_DTM_END: NORMAL
MDC_IDC_STAT_EPISODE_RECENT_COUNT_DTM_END: NORMAL
MDC_IDC_STAT_EPISODE_RECENT_COUNT_DTM_START: NORMAL
MDC_IDC_STAT_EPISODE_RECENT_COUNT_DTM_START: NORMAL
MDC_IDC_STAT_EPISODE_TYPE: NORMAL
MDC_IDC_STAT_EPISODE_TYPE: NORMAL

## 2024-12-04 RX ORDER — LIDOCAINE 40 MG/G
CREAM TOPICAL
OUTPATIENT
Start: 2024-12-04

## 2024-12-04 RX ORDER — VANCOMYCIN HYDROCHLORIDE 1 G/200ML
1000 INJECTION, SOLUTION INTRAVENOUS
OUTPATIENT
Start: 2024-12-04

## 2024-12-04 RX ORDER — SODIUM CHLORIDE 9 MG/ML
100 INJECTION, SOLUTION INTRAVENOUS CONTINUOUS
OUTPATIENT
Start: 2024-12-04

## 2024-12-04 RX ORDER — DEXMEDETOMIDINE HYDROCHLORIDE 4 UG/ML
.1-1.5 INJECTION, SOLUTION INTRAVENOUS CONTINUOUS
OUTPATIENT
Start: 2024-12-04

## 2024-12-04 RX ORDER — FENTANYL CITRATE 50 UG/ML
25 INJECTION, SOLUTION INTRAMUSCULAR; INTRAVENOUS
OUTPATIENT
Start: 2024-12-04

## 2024-12-04 NOTE — TELEPHONE ENCOUNTER
EP MD/EP NC JOEY Review  Dr Menjivar reviewed JOEY checklist  Reviewed by Peg Hart, RN 12/4/2024 9:07 AM    Order for procedure placed in EPIC and  [place]

## 2024-12-04 NOTE — PROGRESS NOTES
HEART CARE FOLLOW UP NOTE      [facility]  [phone]      Assessment/Recommendations   Assessment: 74 year old female with dizziness and fatigue    Plan:  Pre-operative Risk Assessment  As I discussed with Desi Vega, there is no guarantee for lack of jessie-operative cardiac complications, nor has there been any data to support the empiric use of any medications or treatments to mitigate that risk.  Thus, my aim is to assess her risk, and use that assessment to determine if any further testing is indicated that might alter the estimated risk or alter treatment recommendations.  Based upon the RCRI (Revised Ledezma Cardiac Risk Index) she has a score of 1 out of 6 (Positives: history of heart failure.  Negatives: history of ischemic heart disease, insulin requiring diabetes, high risk surgery, creatinine > 2.0, history of stroke).  As such, her estimated risk of a jessie-operative cardiac complication is 6%.  Based on this risk assessment:              -Estimated cardiac risk of 6%          -No further cardiac testing is indicated    Dizziness, fatigue  Reviewed pacemaker check showing no arrhythmia, but a mode change from AAIR-DDDR  bpm to VVI 65bpm on 11/23/2024 when device reached JOEY. Historically she atrial paces ~ 70% and now with the VVI programming she isn't getting the atrial pacing.  This leads to AV dyssynchrony and the symptoms she is having, which are probably being magnified by some hypotension.      -Discussed that this mode change is not re-programmable and is due to JOEY as a battery saving mechanism until she gets a gen change.      -Reassured patient this is not dangerous, harmful, nor a sign of pacemaker malfunction.  There are no arrhythmias and her symptoms will resolve once she gets gen change and is again atrial paced       -Decrease Losartan to 12.5mg for now, see below     Sick sinus s/p pacemaker  Doing well s/p pacemaker 1999, gen change 2011, and now  "at JOEY.  She is very distressed by this      -Offered reassurance as above with plans for gen change     Cardiomyopathy, nonischemic   EF 35-40% on echocardiogram 6/2023, no signs or symptoms of CHF/volume overload       -Recheck echocardiogram pending      -Decrease Losartan to 12.5mg for symptomatic hypotension as below       -Not on diuretic or beta blockers per Dr Esposito      -Follow-up with Dr Esposito as planned     Hypotension   Running a bit low today which is not usual and probably contributing to her symptoms       -Decrease Losartan to 12.5mg and monitor BP      -If/when BP goes back up can increase back to 25mg      The longitudinal plan of care for the diagnosis(es)/condition(s) as documented were addressed during this visit. Due to the added complexity in care, I will continue to support Desi in the subsequent management and with ongoing continuity of care.          History of Present Illness/Subjective    Indication for visit:  Desi Vega returns for RAC follow up of dizziness, dyspnea and was last seen on 11/20/2024 by Dr Esposito for planned follow up of cardiomyopathy and sick sinus.      HPI: Desi Vega is a 74 year old female with a history of nonischemic cardiomyopathy, sick sinus s/p pacemaker, emotional after recently being told it is at [facility], HTN who returns for RAC evaluation of dizziness and dyspnea.    She reports on 12/3 she suddenly got very dizzy and blacked out.  Has been very weak, dizzy, tired, short of breath.    I reviewed notes from PCP, Dr Esposito prior to this visit.         Physical Examination  Past Cardiac History   Vitals: /68 (BP Location: Right arm, Patient Position: Sitting, Cuff Size: Adult Regular)   Pulse 64   Resp 14   Ht 1.613 m (5' 3.5\")   Wt 59 kg (130 lb)   LMP  (LMP Unknown)   BMI 22.67 kg/m    BMI= Body mass index is 22.67 kg/m .  Wt Readings from Last 3 Encounters:   12/05/24 59 kg (130 lb)   11/20/24 59.5 kg (131 lb 3.2 oz)   01/31/24 " 56.9 kg (125 lb 8 oz)       General Appearance:   no distress, normal body habitus   ENT/Mouth: membranes moist, no oral lesions or bleeding gums.      EYES:  no scleral icterus, normal conjunctivae   Neck: no carotid bruits or thyromegaly   Chest/Lungs:   lungs are clear to auscultation, no rales or wheezing,  sternal scar, equal chest wall expansion    Cardiovascular:   Regular. Normal first and second heart sounds with no murmurs, rubs, or gallops; the carotid, radial and posterior tibial pulses are intact, Jugular venous pressure normal, no edema bilaterally    Abdomen:  no organomegaly, masses, bruits, or tenderness; bowel sounds are present   Extremities: no cyanosis or clubbing   Skin: no xanthelasma, warm.    Neurologic: normal  bilateral, no tremors           Cardiomyopathy, nonischemic   Sick sinus syndrome status post Medtronic dual-chamber permanent pacemaker placement 5/18/1999 with a generator replacement 12/7/2011     Most Recent Echocardiogram: 6/12/2023  Left ventricular function is decreased. The ejection fraction is 35-40%  (moderately reduced).  Normal right ventricle size and systolic function.  No hemodynamically significant valvular abnormalities on 2D or color flow  imaging.    Most Recent Stress Test: 7/11/2023    Lexiscan stress ECG negative for ischemia.    The nuclear stress test is abnormal.  There is a small area of fixed defect involving the mid to distal anterior to anterolateral wall suggestive of nontransmural infarction.  No ischemia.    The left ventricular ejection fraction at stress is 56% with mild hypokinesis of the distal anterolateral wall.    There is no prior study for comparison.    CTA: 8/1/2023    Right coronary artery dominant.  No observed significant plaque or stenosis within the visualized coronary tree.    Pacemaker catheter in the right heart    Please see separate report radiology for additional findings.    Most Recent Angiogram: None           Medical  History  Family History Social History   Past Medical History:   Diagnosis Date    Anhedonia     Arthritis     Diarrhea     thought to be due to gastric bypass    Grief reaction     History of palpitations     Hyperlipidemia     Insomnia     Osteopenia     Osteopenia     Pulmonary embolism (H) 09/22/2015    Shortness of breath     Sick sinus syndrome (H)     bradycardia - pacemaker placed    TGA (transient global amnesia) 01/01/2004    Transient global amnesia - on plavix    Tinnitus     Urinary frequency     with some leakage     Family History   Problem Relation Age of Onset    Coronary Artery Disease Father     Diabetes Maternal Grandmother     Diabetes Brother         Social History     Socioeconomic History    Marital status:      Spouse name: Not on file    Number of children: Not on file    Years of education: Not on file    Highest education level: Not on file   Occupational History    Not on file   Tobacco Use    Smoking status: Never    Smokeless tobacco: Never   Substance and Sexual Activity    Alcohol use: Not on file    Drug use: Not on file    Sexual activity: Not on file   Other Topics Concern    Not on file   Social History Narrative    Not on file     Social Drivers of Health     Financial Resource Strain: Low Risk  (1/24/2024)    Financial Resource Strain     Within the past 12 months, have you or your family members you live with been unable to get utilities (heat, electricity) when it was really needed?: No   Food Insecurity: Low Risk  (1/24/2024)    Food Insecurity     Within the past 12 months, did you worry that your food would run out before you got money to buy more?: No     Within the past 12 months, did the food you bought just not last and you didn t have money to get more?: No   Transportation Needs: Low Risk  (1/24/2024)    Transportation Needs     Within the past 12 months, has lack of transportation kept you from medical appointments, getting your medicines, non-medical  meetings or appointments, work, or from getting things that you need?: No   Physical Activity: Inactive (11/8/2021)    Exercise Vital Sign     Days of Exercise per Week: 0 days     Minutes of Exercise per Session: 0 min   Stress: No Stress Concern Present (11/8/2021)    [employer] - Occupational Stress Questionnaire     Feeling of Stress : Only a little   Social Connections: Moderately Isolated (11/8/2021)    Social Connection and Isolation Panel [NHANES]     Frequency of Communication with Friends and Family: Twice a week     Frequency of Social Gatherings with Friends and Family: Twice a week     Attends Islam Services: More than 4 times per year     Active Member of Clubs or Organizations: No     Attends Club or Organization Meetings: Not on file     Marital Status:    Interpersonal Safety: Low Risk  (1/31/2024)    Interpersonal Safety     Do you feel physically and emotionally safe where you currently live?: Yes     Within the past 12 months, have you been hit, slapped, kicked or otherwise physically hurt by someone?: No     Within the past 12 months, have you been humiliated or emotionally abused in other ways by your partner or ex-partner?: No   Housing Stability: Low Risk  (1/24/2024)    Housing Stability     Do you have housing? : Yes     Are you worried about losing your housing?: No           Medications  Allergies   Current Outpatient Medications   Medication Sig Dispense Refill    alendronate (FOSAMAX) 70 MG tablet Take 1 tablet (70 mg) by mouth every 7 days 12 tablet 4    biotin 2.5 MG CAPS Take 1 capsule by mouth daily       CALCIUM PO Take 1,200 mg by mouth daily      ketoconazole (NIZORAL) 2 % external cream Apply topically as needed.      losartan (COZAAR) 25 MG tablet Take 1 tablet (25 mg) by mouth daily 100 tablet 3    Lutein 20 MG CAPS Take 20 mg by mouth daily       magnesium 250 MG tablet Take 1 tablet by mouth daily      Melatonin 10 MG CHEW Take 4  "tablets by mouth daily.      Multiple Vitamins-Minerals (ONCOVITE) TABS Take 1 tablet by mouth daily       polyethylene glycol (MIRALAX) 17 GM/Dose powder Take 1 Capful by mouth daily as needed for constipation.      triamcinolone (KENALOG) 0.5 % external ointment Use twice daily to dermatitis on arm 30 g 3    vitamin D3 (CHOLECALCIFEROL) 125 MCG (5000 UT) tablet Take 5,000 Units by mouth daily       Zinc 20 MG CAPS Take 1 capsule by mouth daily         Allergies   Allergen Reactions    Aspirin Swelling    Bee Venom Swelling     Throat swelling, hands swelling    Fire Ant           Lab Results    Chemistry/lipid CBC Cardiac Enzymes/BNP/TSH/INR   Recent Labs   Lab Test 01/22/24  1104   CHOL 200*      LDL 81   TRIG 79     Recent Labs   Lab Test 01/22/24  1104 10/11/22  1143 11/03/21  1006   LDL 81 73 67     Recent Labs   Lab Test 01/22/24  1104      POTASSIUM 4.0   CHLORIDE 102   CO2 26   GLC 99   BUN 20.9   CR 0.98*   GFRESTIMATED 60*   MATT 9.1     Recent Labs   Lab Test 01/22/24  1104 09/06/23  1629 08/01/23  0725   CR 0.98* 0.91 0.9     Recent Labs   Lab Test 01/22/24  1104 10/11/22  1143 09/10/18  1015   A1C 5.2 5.4 5.5          Recent Labs   Lab Test 09/06/23  1629   WBC 9.5   HGB 12.8   HCT 39.2   MCV 98        Recent Labs   Lab Test 09/06/23  1629 09/10/18  1015 04/09/18  1003   HGB 12.8 14.6 14.2    No results for input(s): \"TROPONINI\" in the last 05279 hours.  Recent Labs   Lab Test 07/11/23  1040   NTBNP 422     Recent Labs   Lab Test 01/22/24  1104   TSH 2.16     No results for input(s): \"INR\" in the last 43629 hours.     Virginia Terry MD  Noninvasive Cardiologist   [facility]                                    "

## 2024-12-04 NOTE — H&P (VIEW-ONLY)
HEART CARE FOLLOW UP NOTE      [facility]  [phone]      Assessment/Recommendations   Assessment: 74 year old female with dizziness and fatigue    Plan:  Pre-operative Risk Assessment  As I discussed with Desi Vega, there is no guarantee for lack of jessie-operative cardiac complications, nor has there been any data to support the empiric use of any medications or treatments to mitigate that risk.  Thus, my aim is to assess her risk, and use that assessment to determine if any further testing is indicated that might alter the estimated risk or alter treatment recommendations.  Based upon the RCRI (Revised Ledezma Cardiac Risk Index) she has a score of 1 out of 6 (Positives: history of heart failure.  Negatives: history of ischemic heart disease, insulin requiring diabetes, high risk surgery, creatinine > 2.0, history of stroke).  As such, her estimated risk of a jessie-operative cardiac complication is 6%.  Based on this risk assessment:              -Estimated cardiac risk of 6%          -No further cardiac testing is indicated    Dizziness, fatigue  Reviewed pacemaker check showing no arrhythmia, but a mode change from AAIR-DDDR  bpm to VVI 65bpm on 11/23/2024 when device reached JOEY. Historically she atrial paces ~ 70% and now with the VVI programming she isn't getting the atrial pacing.  This leads to AV dyssynchrony and the symptoms she is having, which are probably being magnified by some hypotension.      -Discussed that this mode change is not re-programmable and is due to JOEY as a battery saving mechanism until she gets a gen change.      -Reassured patient this is not dangerous, harmful, nor a sign of pacemaker malfunction.  There are no arrhythmias and her symptoms will resolve once she gets gen change and is again atrial paced       -Decrease Losartan to 12.5mg for now, see below     Sick sinus s/p pacemaker  Doing well s/p pacemaker 1999, gen change 2011, and now  "at [facility].  She is very distressed by this      -Offered reassurance as above with plans for gen change     Cardiomyopathy, nonischemic   EF 35-40% on echocardiogram 6/2023, no signs or symptoms of CHF/volume overload       -Recheck echocardiogram pending      -Decrease Losartan to 12.5mg for symptomatic hypotension as below       -Not on diuretic or beta blockers per Dr Esposito      -Follow-up with Dr Esposito as planned     Hypotension   Running a bit low today which is not usual and probably contributing to her symptoms       -Decrease Losartan to 12.5mg and monitor BP      -If/when BP goes back up can increase back to 25mg      The longitudinal plan of care for the diagnosis(es)/condition(s) as documented were addressed during this visit. Due to the added complexity in care, I will continue to support Desi in the subsequent management and with ongoing continuity of care.          History of Present Illness/Subjective    Indication for visit:  Desi Vega returns for RAC follow up of dizziness, dyspnea and was last seen on 11/20/2024 by Dr Esposito for planned follow up of cardiomyopathy and sick sinus.      HPI: Desi Vega is a 74 year old female with a history of nonischemic cardiomyopathy, sick sinus s/p pacemaker, emotional after recently being told it is at [facility] HTN who returns for RAC evaluation of dizziness and dyspnea.    She reports on 12/3 she suddenly got very dizzy and blacked out.  Has been very weak, dizzy, tired, short of breath.    I reviewed notes from PCP, Dr Esposito prior to this visit.         Physical Examination  Past Cardiac History   Vitals: /68 (BP Location: Right arm, Patient Position: Sitting, Cuff Size: Adult Regular)   Pulse 64   Resp 14   Ht 1.613 m (5' 3.5\")   Wt 59 kg (130 lb)   LMP  (LMP Unknown)   BMI 22.67 kg/m    BMI= Body mass index is 22.67 kg/m .  Wt Readings from Last 3 Encounters:   12/05/24 59 kg (130 lb)   11/20/24 59.5 kg (131 lb 3.2 oz)   01/31/24 " 56.9 kg (125 lb 8 oz)       General Appearance:   no distress, normal body habitus   ENT/Mouth: membranes moist, no oral lesions or bleeding gums.      EYES:  no scleral icterus, normal conjunctivae   Neck: no carotid bruits or thyromegaly   Chest/Lungs:   lungs are clear to auscultation, no rales or wheezing,  sternal scar, equal chest wall expansion    Cardiovascular:   Regular. Normal first and second heart sounds with no murmurs, rubs, or gallops; the carotid, radial and posterior tibial pulses are intact, Jugular venous pressure normal, no edema bilaterally    Abdomen:  no organomegaly, masses, bruits, or tenderness; bowel sounds are present   Extremities: no cyanosis or clubbing   Skin: no xanthelasma, warm.    Neurologic: normal  bilateral, no tremors           Cardiomyopathy, nonischemic   Sick sinus syndrome status post Medtronic dual-chamber permanent pacemaker placement 5/18/1999 with a generator replacement 12/7/2011     Most Recent Echocardiogram: 6/12/2023  Left ventricular function is decreased. The ejection fraction is 35-40%  (moderately reduced).  Normal right ventricle size and systolic function.  No hemodynamically significant valvular abnormalities on 2D or color flow  imaging.    Most Recent Stress Test: 7/11/2023    Lexiscan stress ECG negative for ischemia.    The nuclear stress test is abnormal.  There is a small area of fixed defect involving the mid to distal anterior to anterolateral wall suggestive of nontransmural infarction.  No ischemia.    The left ventricular ejection fraction at stress is 56% with mild hypokinesis of the distal anterolateral wall.    There is no prior study for comparison.    CTA: 8/1/2023    Right coronary artery dominant.  No observed significant plaque or stenosis within the visualized coronary tree.    Pacemaker catheter in the right heart    Please see separate report radiology for additional findings.    Most Recent Angiogram: None           Medical  History  Family History Social History   Past Medical History:   Diagnosis Date    Anhedonia     Arthritis     Diarrhea     thought to be due to gastric bypass    Grief reaction     History of palpitations     Hyperlipidemia     Insomnia     Osteopenia     Osteopenia     Pulmonary embolism (H) 09/22/2015    Shortness of breath     Sick sinus syndrome (H)     bradycardia - pacemaker placed    TGA (transient global amnesia) 01/01/2004    Transient global amnesia - on plavix    Tinnitus     Urinary frequency     with some leakage     Family History   Problem Relation Age of Onset    Coronary Artery Disease Father     Diabetes Maternal Grandmother     Diabetes Brother         Social History     Socioeconomic History    Marital status:      Spouse name: Not on file    Number of children: Not on file    Years of education: Not on file    Highest education level: Not on file   Occupational History    Not on file   Tobacco Use    Smoking status: Never    Smokeless tobacco: Never   Substance and Sexual Activity    Alcohol use: Not on file    Drug use: Not on file    Sexual activity: Not on file   Other Topics Concern    Not on file   Social History Narrative    Not on file     Social Drivers of Health     Financial Resource Strain: Low Risk  (1/24/2024)    Financial Resource Strain     Within the past 12 months, have you or your family members you live with been unable to get utilities (heat, electricity) when it was really needed?: No   Food Insecurity: Low Risk  (1/24/2024)    Food Insecurity     Within the past 12 months, did you worry that your food would run out before you got money to buy more?: No     Within the past 12 months, did the food you bought just not last and you didn t have money to get more?: No   Transportation Needs: Low Risk  (1/24/2024)    Transportation Needs     Within the past 12 months, has lack of transportation kept you from medical appointments, getting your medicines, non-medical  meetings or appointments, work, or from getting things that you need?: No   Physical Activity: Inactive (11/8/2021)    Exercise Vital Sign     Days of Exercise per Week: 0 days     Minutes of Exercise per Session: 0 min   Stress: No Stress Concern Present (11/8/2021)    [employer] - Occupational Stress Questionnaire     Feeling of Stress : Only a little   Social Connections: Moderately Isolated (11/8/2021)    Social Connection and Isolation Panel [NHANES]     Frequency of Communication with Friends and Family: Twice a week     Frequency of Social Gatherings with Friends and Family: Twice a week     Attends Religion Services: More than 4 times per year     Active Member of Clubs or Organizations: No     Attends Club or Organization Meetings: Not on file     Marital Status:    Interpersonal Safety: Low Risk  (1/31/2024)    Interpersonal Safety     Do you feel physically and emotionally safe where you currently live?: Yes     Within the past 12 months, have you been hit, slapped, kicked or otherwise physically hurt by someone?: No     Within the past 12 months, have you been humiliated or emotionally abused in other ways by your partner or ex-partner?: No   Housing Stability: Low Risk  (1/24/2024)    Housing Stability     Do you have housing? : Yes     Are you worried about losing your housing?: No           Medications  Allergies   Current Outpatient Medications   Medication Sig Dispense Refill    alendronate (FOSAMAX) 70 MG tablet Take 1 tablet (70 mg) by mouth every 7 days 12 tablet 4    biotin 2.5 MG CAPS Take 1 capsule by mouth daily       CALCIUM PO Take 1,200 mg by mouth daily      ketoconazole (NIZORAL) 2 % external cream Apply topically as needed.      losartan (COZAAR) 25 MG tablet Take 1 tablet (25 mg) by mouth daily 100 tablet 3    Lutein 20 MG CAPS Take 20 mg by mouth daily       magnesium 250 MG tablet Take 1 tablet by mouth daily      Melatonin 10 MG CHEW Take 4  "tablets by mouth daily.      Multiple Vitamins-Minerals (ONCOVITE) TABS Take 1 tablet by mouth daily       polyethylene glycol (MIRALAX) 17 GM/Dose powder Take 1 Capful by mouth daily as needed for constipation.      triamcinolone (KENALOG) 0.5 % external ointment Use twice daily to dermatitis on arm 30 g 3    vitamin D3 (CHOLECALCIFEROL) 125 MCG (5000 UT) tablet Take 5,000 Units by mouth daily       Zinc 20 MG CAPS Take 1 capsule by mouth daily         Allergies   Allergen Reactions    Aspirin Swelling    Bee Venom Swelling     Throat swelling, hands swelling    Fire Ant           Lab Results    Chemistry/lipid CBC Cardiac Enzymes/BNP/TSH/INR   Recent Labs   Lab Test 01/22/24  1104   CHOL 200*      LDL 81   TRIG 79     Recent Labs   Lab Test 01/22/24  1104 10/11/22  1143 11/03/21  1006   LDL 81 73 67     Recent Labs   Lab Test 01/22/24  1104      POTASSIUM 4.0   CHLORIDE 102   CO2 26   GLC 99   BUN 20.9   CR 0.98*   GFRESTIMATED 60*   MATT 9.1     Recent Labs   Lab Test 01/22/24  1104 09/06/23  1629 08/01/23  0725   CR 0.98* 0.91 0.9     Recent Labs   Lab Test 01/22/24  1104 10/11/22  1143 09/10/18  1015   A1C 5.2 5.4 5.5          Recent Labs   Lab Test 09/06/23  1629   WBC 9.5   HGB 12.8   HCT 39.2   MCV 98        Recent Labs   Lab Test 09/06/23  1629 09/10/18  1015 04/09/18  1003   HGB 12.8 14.6 14.2    No results for input(s): \"TROPONINI\" in the last 36299 hours.  Recent Labs   Lab Test 07/11/23  1040   NTBNP 422     Recent Labs   Lab Test 01/22/24  1104   TSH 2.16     No results for input(s): \"INR\" in the last 47160 hours.     Virginia Terry MD  Noninvasive Cardiologist   [facility]                                    "

## 2024-12-04 NOTE — PROGRESS NOTES
AC: None- NA Diuretics: None  DM Meds: None  GLP-1:None     Desi Vega 1950 [id number]  Home:[phone] (home) Cell:[phone] (mobile)  Emergency Contact: CLIFF VEGA [phone]  PCP: Roz Acevedo, [phone]      Important patient information for CSC/Cath Lab staff : None    [facility] EP Cath Lab Procedure Order     Device Implant/Revision:  Procedure: Generator Change Out  Current Device/Device Co Needed for Procedure: Medtronic Dual Pacemaker   Ordering Provider: Dr Menjivar (Generator Changes can be scheduled with any provider)  Date Ordered and Prepped: 12/4/2024 Peg Hart RN  Diagnosis:  Generator Change- Device at JOEY  Scheduling Timeframe:   pt wants ASAP  Scheduling Restrictions: None  Scheduling Contact: Please contact pt to schedule, if you are unable to schedule date within the next 24 hours please contact pt to update on scheduling process  Cardiology Follow Up Apt s/p:  Standard Device follow up General Card @ 6mo (does not include New CRT/CSP/HIS)  Pre-Procedural Testing needed: Echo  Anesthesia:  Conscious Sedation- CV RN to administer    [facility] EP Cath Lab Prep   H&P:  Compled by cardiology on 11/20 if scheduled within 30 days, pt to schedule with PMD if procedure outside of this timeframe  Pre-Procedure Labs/T&S: For SICD & MICRA Devices only schedule lab visit at [facility] lab within 3 days prior to procedure for T&S, BMP, CBC, HcG is appropriate, and INR if on warfarin. All other Devices pre-procedure lab work will be done the morning of the procedure.  Medical Records Pertinent for Procedure: None  Iodinated Contrast Dye Allergies (Does not include Shellfish, Egg, and/or Iodine Allergy)- excludes ILR/SICD:None  Renal Protocol (GFR < 40ml/min, IV contrast past 2 days, EF < or = 25%)- excludes ILR/SICD: No  GLP-1 Protocol: If on Dulaglutide (Trulicity) (weekly)- Injection hold 7 days prior to procedure  , Exenatide extended release (Bydureon bcise) (weekly)-  Injection hold 7 days prior to procedure, Exenatide (Byetta) (twice daily)- Oral Tablet hold day prior and morning of procedure and for Injection hold 7 days prior to procedure, Semaglutide (Ozempic) (weekly)- Injection and Oral hold 7 days prior to procedure, Liraglutide (Victoza, Saxenda) (daily)- Injection hold day prior and morning of procedure    Allergies   Allergen Reactions    Aspirin Swelling    Bee Venom Swelling     Throat swelling, hands swelling    Fire Ant        Current Outpatient Medications:     alendronate (FOSAMAX) 70 MG tablet, Take 1 tablet (70 mg) by mouth every 7 days, Disp: 12 tablet, Rfl: 4    biotin 2.5 MG CAPS, Take 1 capsule by mouth daily , Disp: , Rfl:     CALCIUM PO, Take 1,200 mg by mouth daily, Disp: , Rfl:     ketoconazole (NIZORAL) 2 % external cream, Apply topically daily To area between toes, Disp: 90 g, Rfl: 3    ketoconazole (NIZORAL) 2 % external cream, Apply topically as needed., Disp: , Rfl:     losartan (COZAAR) 25 MG tablet, Take 1 tablet (25 mg) by mouth daily, Disp: 100 tablet, Rfl: 3    Lutein 20 MG CAPS, Take 20 mg by mouth daily , Disp: , Rfl:     magnesium 250 MG tablet, Take 1 tablet by mouth daily, Disp: , Rfl:     Melatonin 10 MG CHEW, Take 4 tablets by mouth daily., Disp: , Rfl:     Multiple Vitamins-Minerals (ONCOVITE) TABS, Take 1 tablet by mouth daily , Disp: , Rfl:     polyethylene glycol (MIRALAX) 17 GM/Dose powder, Take 1 Capful by mouth daily as needed for constipation., Disp: , Rfl:     Probiotic Product (VISBIOME PROBIOTIC HIGH POT PO), , Disp: , Rfl:     triamcinolone (KENALOG) 0.1 % external ointment, Apply topically 2 times daily To shin, Disp: 240 g, Rfl: 3    triamcinolone (KENALOG) 0.5 % external ointment, Use twice daily to dermatitis on arm, Disp: 30 g, Rfl: 3    vitamin D3 (CHOLECALCIFEROL) 125 MCG (5000 UT) tablet, Take 5,000 Units by mouth daily , Disp: , Rfl:     Zinc 20 MG CAPS, Take 1 capsule by mouth daily, Disp: , Rfl:     Documentation  Date:12/4/2024 8:41 AM  Peg Hart RN

## 2024-12-04 NOTE — TELEPHONE ENCOUNTER
"12/4/24: Reviewed upcoming appointment tomorrow with Dr. Terry for symptoms. Patient expressed symptoms of increased SOB, dizziness, fatigue, and \"funny\" feeling head. She denies palpitations. Per Dr. Terry request, patient will send manual remote check now. Also reviewed that her pacemaker is scheduled for replacement on 12/27/24. Sneha Quinones RN  "
----- Message from Virginia Terry sent at 12/4/2024  1:27 PM CST -----  I am seeing this patient for palpitations, she has a dual chamber device.  Is it possible to do a device check today and see if there are any arrhythmias or ectopy to explain her symptoms?    Thanks,  Virginia  
Encounter Type: Courtesy check in prep for RAC appointment with Dr. Terry 12/5/24 for symptoms  Device: Medtronic Adapta (D) pacemaker  Pacing % /Programmed:  0.2%, VVI 65bpm (RRT/JOEY automatic mode switch from AAIR-DDDR 60-130bpm)  Lead(s): Stable  Battery longevity: RRT reached 11/23/24, generator change scheduled for 12/27/24  Presenting: VS 80bpm  Atrial high rates: NA  Anticoagulant: None  Ventricular High rates: Since, 12/3/24, none  Comments: Normal device function. HR's per histogram range primarily 60-80bpm. Reviewed JOEY programming and loss of rate response. Instructed to take it easy until generator change and/or seek ER if symptoms worsen.  Plan: Pacemaker generator change scheduled for 12/27/24. [facility] appointment scheduled 12/5/24 with Dr. Terry. Sneha Quinones, AGUS    Reviewed remote check with Desi.   
18

## 2024-12-05 ENCOUNTER — OFFICE VISIT (OUTPATIENT)
Dept: CARDIOLOGY | Facility: CLINIC | Age: 74
End: 2024-12-05
Payer: COMMERCIAL

## 2024-12-05 VITALS
DIASTOLIC BLOOD PRESSURE: 68 MMHG | BODY MASS INDEX: 22.2 KG/M2 | WEIGHT: 130 LBS | HEIGHT: 64 IN | RESPIRATION RATE: 14 BRPM | HEART RATE: 64 BPM | SYSTOLIC BLOOD PRESSURE: 100 MMHG

## 2024-12-05 DIAGNOSIS — R42 DIZZINESS: ICD-10-CM

## 2024-12-05 DIAGNOSIS — I42.0 DILATED CARDIOMYOPATHY (H): ICD-10-CM

## 2024-12-05 DIAGNOSIS — Z01.818 PRE-OP EXAM: ICD-10-CM

## 2024-12-05 DIAGNOSIS — I95.1 ORTHOSTATIC HYPOTENSION: Primary | ICD-10-CM

## 2024-12-05 DIAGNOSIS — I49.5 SICK SINUS SYNDROME (H): ICD-10-CM

## 2024-12-05 NOTE — PATIENT INSTRUCTIONS
Your symptoms are from the change in pacemaker programming that happens automatically to conserve battery.  This programming change results in the upper and lower chambers of the at no longer beating together.  Your low blood pressure is likely making the symptoms worse.  Try cutting the Losartan in half for now and monitor blood pressure.  If it starts to rise > 140/90 then go back to the full tablet.

## 2024-12-05 NOTE — LETTER
12/5/2024    Roz Acevedo MD  [address] [facility] [address]    RE: Desi MEDINA Gary       Dear Colleague,     I had the pleasure of seeing Desi Vega in the [facility].    HEART CARE FOLLOW UP NOTE      CAROL [facility]  [phone]      Assessment/Recommendations   Assessment: 74 year old female with dizziness and fatigue    Plan:  Pre-operative Risk Assessment  As I discussed with Desi Vega, there is no guarantee for lack of jessie-operative cardiac complications, nor has there been any data to support the empiric use of any medications or treatments to mitigate that risk.  Thus, my aim is to assess her risk, and use that assessment to determine if any further testing is indicated that might alter the estimated risk or alter treatment recommendations.  Based upon the RCRI (Revised Ledezma Cardiac Risk Index) she has a score of 1 out of 6 (Positives: history of heart failure.  Negatives: history of ischemic heart disease, insulin requiring diabetes, high risk surgery, creatinine > 2.0, history of stroke).  As such, her estimated risk of a jessie-operative cardiac complication is 6%.  Based on this risk assessment:              -Estimated cardiac risk of 6%          -No further cardiac testing is indicated    Dizziness, fatigue  Reviewed pacemaker check showing no arrhythmia, but a mode change from AAIR-DDDR  bpm to VVI 65bpm on 11/23/2024 when device reached JOEY. Historically she atrial paces ~ 70% and now with the VVI programming she isn't getting the atrial pacing.  This leads to AV dyssynchrony and the symptoms she is having, which are probably being magnified by some hypotension.      -Discussed that this mode change is not re-programmable and is due to JOEY as a battery saving mechanism until she gets a gen change.      -Reassured patient this is not dangerous, harmful, nor a sign of pacemaker malfunction.  There are no  arrhythmias and her symptoms will resolve once she gets gen change and is again atrial paced       -Decrease Losartan to 12.5mg for now, see below     Sick sinus s/p pacemaker  Doing well s/p pacemaker 1999, gen change 2011, and now at JOEY.  She is very distressed by this      -Offered reassurance as above with plans for gen change     Cardiomyopathy, nonischemic   EF 35-40% on echocardiogram 6/2023, no signs or symptoms of CHF/volume overload       -Recheck echocardiogram pending      -Decrease Losartan to 12.5mg for symptomatic hypotension as below       -Not on diuretic or beta blockers per Dr Esposito      -Follow-up with Dr Esposito as planned     Hypotension   Running a bit low today which is not usual and probably contributing to her symptoms       -Decrease Losartan to 12.5mg and monitor BP      -If/when BP goes back up can increase back to 25mg      The longitudinal plan of care for the diagnosis(es)/condition(s) as documented were addressed during this visit. Due to the added complexity in care, I will continue to support Desi in the subsequent management and with ongoing continuity of care.          History of Present Illness/Subjective    Indication for visit:  Desi Vega returns for [facility] follow up of dizziness, dyspnea and was last seen on 11/20/2024 by Dr Esposito for planned follow up of cardiomyopathy and sick sinus.      HPI: Desi Vega is a 74 year old female with a history of nonischemic cardiomyopathy, sick sinus s/p pacemaker, emotional after recently being told it is at JOEY, HTN who returns for [facility] evaluation of dizziness and dyspnea.    She reports on 12/3 she suddenly got very dizzy and blacked out.  Has been very weak, dizzy, tired, short of breath.    I reviewed notes from PCP, Dr Esposito prior to this visit.         Physical Examination  Past Cardiac History   Vitals: /68 (BP Location: Right arm, Patient Position: Sitting, Cuff Size: Adult Regular)   Pulse 64   Resp 14   " Ht 1.613 m (5' 3.5\")   Wt 59 kg (130 lb)   LMP  (LMP Unknown)   BMI 22.67 kg/m    BMI= Body mass index is 22.67 kg/m .  Wt Readings from Last 3 Encounters:   12/05/24 59 kg (130 lb)   11/20/24 59.5 kg (131 lb 3.2 oz)   01/31/24 56.9 kg (125 lb 8 oz)       General Appearance:   no distress, normal body habitus   ENT/Mouth: membranes moist, no oral lesions or bleeding gums.      EYES:  no scleral icterus, normal conjunctivae   Neck: no carotid bruits or thyromegaly   Chest/Lungs:   lungs are clear to auscultation, no rales or wheezing,  sternal scar, equal chest wall expansion    Cardiovascular:   Regular. Normal first and second heart sounds with no murmurs, rubs, or gallops; the carotid, radial and posterior tibial pulses are intact, Jugular venous pressure normal, no edema bilaterally    Abdomen:  no organomegaly, masses, bruits, or tenderness; bowel sounds are present   Extremities: no cyanosis or clubbing   Skin: no xanthelasma, warm.    Neurologic: normal  bilateral, no tremors           Cardiomyopathy, nonischemic   Sick sinus syndrome status post Medtronic dual-chamber permanent pacemaker placement 5/18/1999 with a generator replacement 12/7/2011     Most Recent Echocardiogram: 6/12/2023  Left ventricular function is decreased. The ejection fraction is 35-40%  (moderately reduced).  Normal right ventricle size and systolic function.  No hemodynamically significant valvular abnormalities on 2D or color flow  imaging.    Most Recent Stress Test: 7/11/2023     Lexiscan stress ECG negative for ischemia.     The nuclear stress test is abnormal.  There is a small area of fixed defect involving the mid to distal anterior to anterolateral wall suggestive of nontransmural infarction.  No ischemia.     The left ventricular ejection fraction at stress is 56% with mild hypokinesis of the distal anterolateral wall.     There is no prior study for comparison.    CTA: 8/1/2023     Right coronary artery dominant.  No " observed significant plaque or stenosis within the visualized coronary tree.     Pacemaker catheter in the right heart     Please see separate report radiology for additional findings.    Most Recent Angiogram: None           Medical History  Family History Social History   Past Medical History:   Diagnosis Date     Anhedonia      Arthritis      Diarrhea     thought to be due to gastric bypass     Grief reaction      History of palpitations      Hyperlipidemia      Insomnia      Osteopenia      Osteopenia      Pulmonary embolism (H) 09/22/2015     Shortness of breath      Sick sinus syndrome (H)     bradycardia - pacemaker placed     TGA (transient global amnesia) 01/01/2004    Transient global amnesia - on plavix     Tinnitus      Urinary frequency     with some leakage     Family History   Problem Relation Age of Onset     Coronary Artery Disease Father      Diabetes Maternal Grandmother      Diabetes Brother         Social History     Socioeconomic History     Marital status:      Spouse name: Not on file     Number of children: Not on file     Years of education: Not on file     Highest education level: Not on file   Occupational History     Not on file   Tobacco Use     Smoking status: Never     Smokeless tobacco: Never   Substance and Sexual Activity     Alcohol use: Not on file     Drug use: Not on file     Sexual activity: Not on file   Other Topics Concern     Not on file   Social History Narrative     Not on file     Social Drivers of Health     Financial Resource Strain: Low Risk  (1/24/2024)    Financial Resource Strain      Within the past 12 months, have you or your family members you live with been unable to get utilities (heat, electricity) when it was really needed?: No   Food Insecurity: Low Risk  (1/24/2024)    Food Insecurity      Within the past 12 months, did you worry that your food would run out before you got money to buy more?: No      Within the past 12 months, did the food you  bought just not last and you didn t have money to get more?: No   Transportation Needs: Low Risk  (1/24/2024)    Transportation Needs      Within the past 12 months, has lack of transportation kept you from medical appointments, getting your medicines, non-medical meetings or appointments, work, or from getting things that you need?: No   Physical Activity: Inactive (11/8/2021)    Exercise Vital Sign      Days of Exercise per Week: 0 days      Minutes of Exercise per Session: 0 min   Stress: No Stress Concern Present (11/8/2021)    Citizen of [place] [employer] of Occupational Health - Occupational Stress Questionnaire      Feeling of Stress : Only a little   Social Connections: Moderately Isolated (11/8/2021)    Social Connection and Isolation Panel [NHANES]      Frequency of Communication with Friends and Family: Twice a week      Frequency of Social Gatherings with Friends and Family: Twice a week      Attends [employer] Services: More than 4 times per year      Active Member of Clubs or Organizations: No      Attends Club or Organization Meetings: Not on file      Marital Status:    Interpersonal Safety: Low Risk  (1/31/2024)    Interpersonal Safety      Do you feel physically and emotionally safe where you currently live?: Yes      Within the past 12 months, have you been hit, slapped, kicked or otherwise physically hurt by someone?: No      Within the past 12 months, have you been humiliated or emotionally abused in other ways by your partner or ex-partner?: No   Housing Stability: Low Risk  (1/24/2024)    Housing Stability      Do you have housing? : Yes      Are you worried about losing your housing?: No           Medications  Allergies   Current Outpatient Medications   Medication Sig Dispense Refill     alendronate (FOSAMAX) 70 MG tablet Take 1 tablet (70 mg) by mouth every 7 days 12 tablet 4     biotin 2.5 MG CAPS Take 1 capsule by mouth daily        CALCIUM PO Take 1,200 mg by mouth daily       ketoconazole  "(NIZORAL) 2 % external cream Apply topically as needed.       losartan (COZAAR) 25 MG tablet Take 1 tablet (25 mg) by mouth daily 100 tablet 3     Lutein 20 MG CAPS Take 20 mg by mouth daily        magnesium 250 MG tablet Take 1 tablet by mouth daily       Melatonin 10 MG CHEW Take 4 tablets by mouth daily.       Multiple Vitamins-Minerals (ONCOVITE) TABS Take 1 tablet by mouth daily        polyethylene glycol (MIRALAX) 17 GM/Dose powder Take 1 Capful by mouth daily as needed for constipation.       triamcinolone (KENALOG) 0.5 % external ointment Use twice daily to dermatitis on arm 30 g 3     vitamin D3 (CHOLECALCIFEROL) 125 MCG (5000 UT) tablet Take 5,000 Units by mouth daily        Zinc 20 MG CAPS Take 1 capsule by mouth daily         Allergies   Allergen Reactions     Aspirin Swelling     Bee Venom Swelling     Throat swelling, hands swelling     Fire Ant           Lab Results    Chemistry/lipid CBC Cardiac Enzymes/BNP/TSH/INR   Recent Labs   Lab Test 01/22/24  1104   CHOL 200*      LDL 81   TRIG 79     Recent Labs   Lab Test 01/22/24  1104 10/11/22  1143 11/03/21  1006   LDL 81 73 67     Recent Labs   Lab Test 01/22/24  1104      POTASSIUM 4.0   CHLORIDE 102   CO2 26   GLC 99   BUN 20.9   CR 0.98*   GFRESTIMATED 60*   MATT 9.1     Recent Labs   Lab Test 01/22/24  1104 09/06/23  1629 08/01/23  0725   CR 0.98* 0.91 0.9     Recent Labs   Lab Test 01/22/24  1104 10/11/22  1143 09/10/18  1015   A1C 5.2 5.4 5.5          Recent Labs   Lab Test 09/06/23  1629   WBC 9.5   HGB 12.8   HCT 39.2   MCV 98        Recent Labs   Lab Test 09/06/23  1629 09/10/18  1015 04/09/18  1003   HGB 12.8 14.6 14.2    No results for input(s): \"TROPONINI\" in the last 19916 hours.  Recent Labs   Lab Test 07/11/23  1040   NTBNP 422     Recent Labs   Lab Test 01/22/24  1104   TSH 2.16     No results for input(s): \"INR\" in the last 47613 hours.     Virginia Terry MD  Noninvasive Cardiologist   [facility]" Care                                        Thank you for allowing me to participate in the care of your patient.      Sincerely,     Virginia Terry MD     [facility]  cc:   Bhavik Esposito MD  [address] [address]

## 2024-12-06 ENCOUNTER — HOSPITAL ENCOUNTER (OUTPATIENT)
Dept: CARDIOLOGY | Facility: HOSPITAL | Age: 74
Discharge: HOME | End: 2024-12-06
Attending: GENERAL ACUTE CARE HOSPITAL | Admitting: GENERAL ACUTE CARE HOSPITAL
Payer: COMMERCIAL

## 2024-12-06 DIAGNOSIS — I42.8 NON-ISCHEMIC CARDIOMYOPATHY (H): ICD-10-CM

## 2024-12-06 LAB — LVEF ECHO: NORMAL

## 2024-12-06 PROCEDURE — 93306 TTE W/DOPPLER COMPLETE: CPT | Mod: 26 | Performed by: GENERAL ACUTE CARE HOSPITAL

## 2024-12-06 PROCEDURE — 93306 TTE W/DOPPLER COMPLETE: CPT

## 2024-12-19 ENCOUNTER — TELEPHONE (OUTPATIENT)
Dept: CARDIOLOGY | Facility: CLINIC | Age: 74
End: 2024-12-19

## 2024-12-19 NOTE — TELEPHONE ENCOUNTER
Pre-Procedure Education    Procedure: PPM Generator Change with Dr Calvo on 12/27/24 with arrival time 7:00 am    Orders: Orderset for procedure verified signed/held    COVID: COVID policy- if pt develops COVID like symptoms prior to procedure, he/she would need to complete an at home with a rapid antigen COVID test 1-2 days prior to your procedure date. If COVID + pt is aware the procedure will need to be rescheduled, and to contact CV scheduling as soon as possible    Pre-Op H&P: Completed- Available in Epic from 12/5/24    Education:   Pre-Procedure Instruction: NPO after midnight pre procedure, Defined NPO with pt, Remove all jewelry and leave all valuables at home, Shower prior to arrival, Sedation plan/orders, Transportation requirements and arrangements post procedure, Post-procedure follow up process, Post-procedure restrictions/expectations, and Pre-procedure letter sent- letter tab  Risks:  Permanent Programable Pacemaker (PPM) Risks  1% Pneumothorax  1-2% Infection, Pocket erosion  1-2% Major bleeding, Hematoma (increased with anticoagulation therapy); 5-10% Minor bleeding  1-2% Acute subclavian vein thrombosis & 10% Chronic subclavian vein thrombosis  <1% Cardiac perforation, Cardiac tamponade, Arrythmias, Diaphragmatic stimulation  <2% % Lead dislodgment, <1% Lead fracture or Generator malfunction  < 0.1% Death  <2% Tricuspid valve dysfunction  < 5% Need for ICD system revision  If external defibrillation or CV is needed, 25% risk for superficial burn.  Risk for electromagnetic interference (JEIMY), and psychological and social aspects of having an PPM.     Medication:   Instructions regarding anticoagulants: Pt not on AC- N/A  Instructions regarding antiarrhythmic medication: None; N/A  Instructions given to pt regarding diuretics medication: None  Instructions given to pt regarding DM/GLP-1 medication:   DM- None  GLP-1- None  Instructions for medication, other than anticoagulants and  antiarrhythmics listed above, given to pt: Take all medication AM of procedure with small sips of water     Important patient information for staff: None    12/19/2024 8:42 AM  Goldie Fitzpatrick RN

## 2024-12-19 NOTE — TELEPHONE ENCOUNTER
Spoke with pt regarding all below information. Pt verbalized understanding. All questions answered.     Goldie Fitzpatrick RN

## 2024-12-27 ENCOUNTER — HOSPITAL ENCOUNTER (OUTPATIENT)
Facility: HOSPITAL | Age: 74
Discharge: HOME | End: 2024-12-27
Attending: INTERNAL MEDICINE | Admitting: INTERNAL MEDICINE
Payer: COMMERCIAL

## 2024-12-27 VITALS
BODY MASS INDEX: 22.2 KG/M2 | DIASTOLIC BLOOD PRESSURE: 62 MMHG | RESPIRATION RATE: 18 BRPM | TEMPERATURE: 97.8 F | HEART RATE: 65 BPM | HEIGHT: 64 IN | OXYGEN SATURATION: 100 % | SYSTOLIC BLOOD PRESSURE: 129 MMHG | WEIGHT: 130 LBS

## 2024-12-27 DIAGNOSIS — I49.5 SICK SINUS SYNDROME (H): ICD-10-CM

## 2024-12-27 DIAGNOSIS — Z95.0 CARDIAC PACEMAKER IN SITU: Primary | ICD-10-CM

## 2024-12-27 LAB
ANION GAP SERPL CALCULATED.3IONS-SCNC: 12 MMOL/L (ref 7–15)
BUN SERPL-MCNC: 21.9 MG/DL (ref 8–23)
CALCIUM SERPL-MCNC: 8.6 MG/DL (ref 8.8–10.4)
CHLORIDE SERPL-SCNC: 107 MMOL/L (ref 98–107)
CREAT SERPL-MCNC: 0.91 MG/DL (ref 0.51–0.95)
EGFRCR SERPLBLD CKD-EPI 2021: 65 ML/MIN/1.73M2
ERYTHROCYTE [DISTWIDTH] IN BLOOD BY AUTOMATED COUNT: 14.3 % (ref 10–15)
GLUCOSE SERPL-MCNC: 90 MG/DL (ref 70–99)
HCO3 SERPL-SCNC: 24 MMOL/L (ref 22–29)
HCT VFR BLD AUTO: 37.4 % (ref 35–47)
HGB BLD-MCNC: 12.3 G/DL (ref 11.7–15.7)
MCH RBC QN AUTO: 32.6 PG (ref 26.5–33)
MCHC RBC AUTO-ENTMCNC: 32.9 G/DL (ref 31.5–36.5)
MCV RBC AUTO: 99 FL (ref 78–100)
PLATELET # BLD AUTO: 201 10E3/UL (ref 150–450)
POTASSIUM SERPL-SCNC: 3.9 MMOL/L (ref 3.4–5.3)
RBC # BLD AUTO: 3.77 10E6/UL (ref 3.8–5.2)
SODIUM SERPL-SCNC: 143 MMOL/L (ref 135–145)
WBC # BLD AUTO: 5.8 10E3/UL (ref 4–11)

## 2024-12-27 PROCEDURE — 250N000011 HC RX IP 250 OP 636: Performed by: INTERNAL MEDICINE

## 2024-12-27 PROCEDURE — 82374 ASSAY BLOOD CARBON DIOXIDE: CPT | Performed by: INTERNAL MEDICINE

## 2024-12-27 PROCEDURE — 258N000003 HC RX IP 258 OP 636: Performed by: INTERNAL MEDICINE

## 2024-12-27 PROCEDURE — 33228 REMV&REPLC PM GEN DUAL LEAD: CPT | Performed by: INTERNAL MEDICINE

## 2024-12-27 PROCEDURE — C1785 PMKR, DUAL, RATE-RESP: HCPCS | Performed by: INTERNAL MEDICINE

## 2024-12-27 PROCEDURE — 99152 MOD SED SAME PHYS/QHP 5/>YRS: CPT | Performed by: INTERNAL MEDICINE

## 2024-12-27 PROCEDURE — 250N000009 HC RX 250: Performed by: INTERNAL MEDICINE

## 2024-12-27 PROCEDURE — 85027 COMPLETE CBC AUTOMATED: CPT | Performed by: INTERNAL MEDICINE

## 2024-12-27 PROCEDURE — 36415 COLL VENOUS BLD VENIPUNCTURE: CPT | Performed by: INTERNAL MEDICINE

## 2024-12-27 PROCEDURE — 272N000001 HC OR GENERAL SUPPLY STERILE: Performed by: INTERNAL MEDICINE

## 2024-12-27 PROCEDURE — 80048 BASIC METABOLIC PNL TOTAL CA: CPT | Performed by: INTERNAL MEDICINE

## 2024-12-27 DEVICE — PACEMAKER AZURE MRI XT DR: Type: IMPLANTABLE DEVICE | Site: CHEST  WALL | Status: FUNCTIONAL

## 2024-12-27 RX ORDER — ACETAMINOPHEN 325 MG/1
650 TABLET ORAL EVERY 4 HOURS PRN
Status: DISCONTINUED | OUTPATIENT
Start: 2024-12-27 | End: 2024-12-27 | Stop reason: HOSPADM

## 2024-12-27 RX ORDER — LIDOCAINE 40 MG/G
CREAM TOPICAL
Status: DISCONTINUED | OUTPATIENT
Start: 2024-12-27 | End: 2024-12-27 | Stop reason: HOSPADM

## 2024-12-27 RX ORDER — FENTANYL CITRATE 50 UG/ML
25 INJECTION, SOLUTION INTRAMUSCULAR; INTRAVENOUS
Status: DISCONTINUED | OUTPATIENT
Start: 2024-12-27 | End: 2024-12-27 | Stop reason: HOSPADM

## 2024-12-27 RX ORDER — VANCOMYCIN HYDROCHLORIDE 1 G/200ML
1000 INJECTION, SOLUTION INTRAVENOUS
Status: COMPLETED | OUTPATIENT
Start: 2024-12-27 | End: 2024-12-27

## 2024-12-27 RX ORDER — FENTANYL CITRATE 50 UG/ML
INJECTION, SOLUTION INTRAMUSCULAR; INTRAVENOUS
Status: DISCONTINUED | OUTPATIENT
Start: 2024-12-27 | End: 2024-12-27 | Stop reason: HOSPADM

## 2024-12-27 RX ORDER — DEXMEDETOMIDINE HYDROCHLORIDE 4 UG/ML
.1-1.5 INJECTION, SOLUTION INTRAVENOUS CONTINUOUS
Status: DISCONTINUED | OUTPATIENT
Start: 2024-12-27 | End: 2024-12-27 | Stop reason: HOSPADM

## 2024-12-27 RX ORDER — VITAMIN E 268 MG
400 CAPSULE ORAL DAILY
COMMUNITY

## 2024-12-27 RX ORDER — FERROUS GLUCONATE 324(37.5)
1 TABLET ORAL DAILY
COMMUNITY

## 2024-12-27 RX ORDER — SODIUM CHLORIDE 9 MG/ML
100 INJECTION, SOLUTION INTRAVENOUS CONTINUOUS
Status: DISCONTINUED | OUTPATIENT
Start: 2024-12-27 | End: 2024-12-27 | Stop reason: HOSPADM

## 2024-12-27 RX ADMIN — SODIUM CHLORIDE 100 ML/HR: 9 INJECTION, SOLUTION INTRAVENOUS at 07:51

## 2024-12-27 RX ADMIN — VANCOMYCIN HYDROCHLORIDE 1000 MG: 1 INJECTION, SOLUTION INTRAVENOUS at 08:13

## 2024-12-27 ASSESSMENT — ACTIVITIES OF DAILY LIVING (ADL)
ADLS_ACUITY_SCORE: 41

## 2024-12-27 NOTE — Clinical Note
Patient to floor Patient will be returning to [facility] 4..

## 2024-12-27 NOTE — DISCHARGE INSTRUCTIONS
[facility]  Cardiac Electrophysiology  [address]  [place] [address]   Office: [phone]  Fax: [phone]     Cardiac Electrophysiology - Post Pacemaker or Defibrillator Generator Replacement Discharge Instructions      PROCEDURE   Pacemaker generator replacement         MEDICATION INSTRUCTIONS   Continue taking all your prior to procedure medications.         WOUND CARE   Leave the large overlying dressing in place until 2 days after discharge - this dressing can thereafter be removed by gently pulling off.  Underneath the large dressing will be steri-strips. DO NOT REMOVE these strips; please leave these in place until you are seen in clinic.  DO NOT COVER the site with any bandages or dressings. The site should be kept dry for 7 days - please avoid traditional showers or baths during this time.  Keep the site clean and dry.  No swimming, sauna, or hot tub use until incision is completely healed.         ACTIVITY   It takes approximately 1-2 weeks for your incision to heal. During this time use extra precautions.  Avoid the following:  Raising your arm over your head or stretching behind your back (no hyperflexion)  Pushing or pulling (mowing the lawn, vacuuming)  Lifting anything heavier than 10 pounds or a gallon of milk  Sudden or extreme motions  Be physically active every day.  Moving your arm is important       REMOTE MONITORING   You will receive a remote transmission station to monitor your device from home  Please set the transmitter up as soon as you get home from the hospital.  Directions are included in the box, and you may call our office or the company technical support line if you need assistance connecting your transmitter.  Please send a transmission the day after you go home  Automated transmissions will be sent every 3 months or sooner if device issues are detected.  You may manually send in transmissions if you are having arrhythmia symptoms or  think there may be a problem with your device.  Please call our office at [phone] if you send in a transmission off-schedule         WHAT TO WATCH OUT FOR   Contact our office or seek emergency care for any of the following:  Drainage, bleeding or oozing from the site  Redness, increased swelling, or warmth around the device site  Pain not treated with prescribed pain medication  Temperature greater than 100.4F  Chest pain, shortness of breath, dizziness/fainting         FOLLOW-UP   Our office will coordinate a follow-up visit visit in clinic for a device interrogation and an incision check 7-14 days after your procedure.   Please contact us at [phone] with any issues during your recovery.

## 2024-12-27 NOTE — PROGRESS NOTES
Pt ready for Generator change procedure. Labs noted, checklist complete. Vanco infused,, Scrub completed. Family present.

## 2024-12-27 NOTE — Clinical Note
Site: [facility]  Type:  Medtronic AEX   SJN Cautery Serial Number:-[id number], BRI [id number]

## 2024-12-27 NOTE — PRE-PROCEDURE
GENERAL PRE-PROCEDURE:   Procedure:  Pacemaker generator replacement  Date/Time:  12/27/2024 8:25 AM    Written consent obtained?: Yes    Risks and benefits: Risks, benefits and alternatives were discussed    Consent given by:  Patient  Patient states understanding of procedure being performed: Yes    Patient's understanding of procedure matches consent: Yes    Procedure consent matches procedure scheduled: Yes    Expected level of sedation:  Moderate  Appropriately NPO:  Yes  ASA Class:  2  Mallampati  :  Grade 2- soft palate, base of uvula, tonsillar pillars, and portion of posterior pharyngeal wall visible  Lungs:  Lungs clear with good breath sounds bilaterally  Heart:  Normal heart sounds and rate  History & Physical reviewed:  History and physical reviewed and updates made (see comment)  H&P Comments:  Clinically Significant Risk Factors Present on Admission    Cardiovascular : sick sinus syndrome s/p PPM, nonischemic cardiomyopathy, heart failure with mildly reduced ejection fraction    Fluid & Electrolyte Disorders : Not present on admission    Gastroenterology : Not present on admission    Hematology/Oncology : Not present on admission    Nephrology : Not present on admission    Neurology : Not present on admission    Pulmonology : Not present on admission    Systemic : Not present on admission    [unfilled]    Statement of review:  I have reviewed the lab findings, diagnostic data, medications, and the plan for sedation

## 2024-12-31 ENCOUNTER — TRANSFERRED RECORDS (OUTPATIENT)
Dept: HEALTH INFORMATION MANAGEMENT | Facility: CLINIC | Age: 74
End: 2024-12-31
Payer: COMMERCIAL

## 2025-01-03 ENCOUNTER — ANCILLARY PROCEDURE (OUTPATIENT)
Dept: CARDIOLOGY | Facility: CLINIC | Age: 75
End: 2025-01-03
Attending: INTERNAL MEDICINE
Payer: COMMERCIAL

## 2025-01-03 DIAGNOSIS — I49.5 SICK SINUS SYNDROME (H): ICD-10-CM

## 2025-01-03 DIAGNOSIS — Z95.0 CARDIAC PACEMAKER IN SITU: ICD-10-CM

## 2025-01-06 LAB
MDC_IDC_LEAD_CONNECTION_STATUS: NORMAL
MDC_IDC_LEAD_CONNECTION_STATUS: NORMAL
MDC_IDC_LEAD_IMPLANT_DT: NORMAL
MDC_IDC_LEAD_IMPLANT_DT: NORMAL
MDC_IDC_LEAD_LOCATION: NORMAL
MDC_IDC_LEAD_LOCATION: NORMAL
MDC_IDC_LEAD_MFG: NORMAL
MDC_IDC_LEAD_MFG: NORMAL
MDC_IDC_LEAD_MODEL: NORMAL
MDC_IDC_LEAD_MODEL: NORMAL
MDC_IDC_LEAD_POLARITY_TYPE: NORMAL
MDC_IDC_LEAD_POLARITY_TYPE: NORMAL
MDC_IDC_LEAD_SERIAL: NORMAL
MDC_IDC_LEAD_SERIAL: NORMAL
MDC_IDC_LEAD_SPECIAL_FUNCTION: NORMAL
MDC_IDC_LEAD_SPECIAL_FUNCTION: NORMAL
MDC_IDC_MSMT_BATTERY_DTM: NORMAL
MDC_IDC_MSMT_BATTERY_REMAINING_LONGEVITY: 169 MO
MDC_IDC_MSMT_BATTERY_RRT_TRIGGER: 2.62
MDC_IDC_MSMT_BATTERY_STATUS: NORMAL
MDC_IDC_MSMT_BATTERY_VOLTAGE: 3.23 V
MDC_IDC_MSMT_LEADCHNL_RA_IMPEDANCE_VALUE: 361 OHM
MDC_IDC_MSMT_LEADCHNL_RA_IMPEDANCE_VALUE: 532 OHM
MDC_IDC_MSMT_LEADCHNL_RA_PACING_THRESHOLD_AMPLITUDE: 0.75 V
MDC_IDC_MSMT_LEADCHNL_RA_PACING_THRESHOLD_AMPLITUDE: 0.88 V
MDC_IDC_MSMT_LEADCHNL_RA_PACING_THRESHOLD_PULSEWIDTH: 0.4 MS
MDC_IDC_MSMT_LEADCHNL_RA_PACING_THRESHOLD_PULSEWIDTH: 0.4 MS
MDC_IDC_MSMT_LEADCHNL_RA_SENSING_INTR_AMPL: 0.88 MV
MDC_IDC_MSMT_LEADCHNL_RA_SENSING_INTR_AMPL: 1.25 MV
MDC_IDC_MSMT_LEADCHNL_RV_IMPEDANCE_VALUE: 361 OHM
MDC_IDC_MSMT_LEADCHNL_RV_IMPEDANCE_VALUE: 456 OHM
MDC_IDC_MSMT_LEADCHNL_RV_PACING_THRESHOLD_AMPLITUDE: 1.5 V
MDC_IDC_MSMT_LEADCHNL_RV_PACING_THRESHOLD_AMPLITUDE: 2.38 V
MDC_IDC_MSMT_LEADCHNL_RV_PACING_THRESHOLD_PULSEWIDTH: 0.4 MS
MDC_IDC_MSMT_LEADCHNL_RV_PACING_THRESHOLD_PULSEWIDTH: 1 MS
MDC_IDC_MSMT_LEADCHNL_RV_SENSING_INTR_AMPL: 10.5 MV
MDC_IDC_MSMT_LEADCHNL_RV_SENSING_INTR_AMPL: 7.5 MV
MDC_IDC_PG_IMPLANT_DTM: NORMAL
MDC_IDC_PG_MFG: NORMAL
MDC_IDC_PG_MODEL: NORMAL
MDC_IDC_PG_SERIAL: NORMAL
MDC_IDC_PG_TYPE: NORMAL
MDC_IDC_SESS_CLINIC_NAME: NORMAL
MDC_IDC_SESS_DTM: NORMAL
MDC_IDC_SESS_TYPE: NORMAL
MDC_IDC_SET_BRADY_AT_MODE_SWITCH_RATE: 171 {BEATS}/MIN
MDC_IDC_SET_BRADY_HYSTRATE: NORMAL
MDC_IDC_SET_BRADY_LOWRATE: 60 {BEATS}/MIN
MDC_IDC_SET_BRADY_MAX_SENSOR_RATE: 130 {BEATS}/MIN
MDC_IDC_SET_BRADY_MAX_TRACKING_RATE: 130 {BEATS}/MIN
MDC_IDC_SET_BRADY_MODE: NORMAL
MDC_IDC_SET_BRADY_PAV_DELAY_LOW: 180 MS
MDC_IDC_SET_BRADY_SAV_DELAY_LOW: 150 MS
MDC_IDC_SET_LEADCHNL_RA_PACING_AMPLITUDE: NORMAL
MDC_IDC_SET_LEADCHNL_RA_PACING_ANODE_ELECTRODE_1: NORMAL
MDC_IDC_SET_LEADCHNL_RA_PACING_ANODE_LOCATION_1: NORMAL
MDC_IDC_SET_LEADCHNL_RA_PACING_CAPTURE_MODE: NORMAL
MDC_IDC_SET_LEADCHNL_RA_PACING_CATHODE_ELECTRODE_1: NORMAL
MDC_IDC_SET_LEADCHNL_RA_PACING_CATHODE_LOCATION_1: NORMAL
MDC_IDC_SET_LEADCHNL_RA_PACING_POLARITY: NORMAL
MDC_IDC_SET_LEADCHNL_RA_PACING_PULSEWIDTH: 0.4 MS
MDC_IDC_SET_LEADCHNL_RA_SENSING_ANODE_ELECTRODE_1: NORMAL
MDC_IDC_SET_LEADCHNL_RA_SENSING_ANODE_LOCATION_1: NORMAL
MDC_IDC_SET_LEADCHNL_RA_SENSING_CATHODE_ELECTRODE_1: NORMAL
MDC_IDC_SET_LEADCHNL_RA_SENSING_CATHODE_LOCATION_1: NORMAL
MDC_IDC_SET_LEADCHNL_RA_SENSING_POLARITY: NORMAL
MDC_IDC_SET_LEADCHNL_RA_SENSING_SENSITIVITY: 0.3 MV
MDC_IDC_SET_LEADCHNL_RV_PACING_AMPLITUDE: 2.25 V
MDC_IDC_SET_LEADCHNL_RV_PACING_ANODE_ELECTRODE_1: NORMAL
MDC_IDC_SET_LEADCHNL_RV_PACING_ANODE_LOCATION_1: NORMAL
MDC_IDC_SET_LEADCHNL_RV_PACING_CAPTURE_MODE: NORMAL
MDC_IDC_SET_LEADCHNL_RV_PACING_CATHODE_ELECTRODE_1: NORMAL
MDC_IDC_SET_LEADCHNL_RV_PACING_CATHODE_LOCATION_1: NORMAL
MDC_IDC_SET_LEADCHNL_RV_PACING_POLARITY: NORMAL
MDC_IDC_SET_LEADCHNL_RV_PACING_PULSEWIDTH: 1 MS
MDC_IDC_SET_LEADCHNL_RV_SENSING_ANODE_ELECTRODE_1: NORMAL
MDC_IDC_SET_LEADCHNL_RV_SENSING_ANODE_LOCATION_1: NORMAL
MDC_IDC_SET_LEADCHNL_RV_SENSING_CATHODE_ELECTRODE_1: NORMAL
MDC_IDC_SET_LEADCHNL_RV_SENSING_CATHODE_LOCATION_1: NORMAL
MDC_IDC_SET_LEADCHNL_RV_SENSING_POLARITY: NORMAL
MDC_IDC_SET_LEADCHNL_RV_SENSING_SENSITIVITY: 0.9 MV
MDC_IDC_SET_ZONE_DETECTION_INTERVAL: 350 MS
MDC_IDC_SET_ZONE_DETECTION_INTERVAL: 400 MS
MDC_IDC_SET_ZONE_STATUS: NORMAL
MDC_IDC_SET_ZONE_STATUS: NORMAL
MDC_IDC_SET_ZONE_TYPE: NORMAL
MDC_IDC_SET_ZONE_VENDOR_TYPE: NORMAL
MDC_IDC_STAT_AT_BURDEN_PERCENT: 0 %
MDC_IDC_STAT_AT_DTM_END: NORMAL
MDC_IDC_STAT_AT_DTM_START: NORMAL
MDC_IDC_STAT_AT_MODE_SW_COUNT: 0
MDC_IDC_STAT_BRADY_AP_VP_PERCENT: 0.08 %
MDC_IDC_STAT_BRADY_AP_VS_PERCENT: 80.02 %
MDC_IDC_STAT_BRADY_AS_VP_PERCENT: 0.01 %
MDC_IDC_STAT_BRADY_AS_VS_PERCENT: 19.89 %
MDC_IDC_STAT_BRADY_DTM_END: NORMAL
MDC_IDC_STAT_BRADY_DTM_START: NORMAL
MDC_IDC_STAT_BRADY_RA_PERCENT_PACED: 80.14 %
MDC_IDC_STAT_BRADY_RV_PERCENT_PACED: 0.09 %
MDC_IDC_STAT_EPISODE_RECENT_COUNT: 0
MDC_IDC_STAT_EPISODE_RECENT_COUNT_DTM_END: NORMAL
MDC_IDC_STAT_EPISODE_RECENT_COUNT_DTM_START: NORMAL
MDC_IDC_STAT_EPISODE_TOTAL_COUNT: 0
MDC_IDC_STAT_EPISODE_TOTAL_COUNT_DTM_END: NORMAL
MDC_IDC_STAT_EPISODE_TOTAL_COUNT_DTM_START: NORMAL
MDC_IDC_STAT_EPISODE_TYPE: NORMAL
MDC_IDC_STAT_TACHYTHERAPY_RECENT_DTM_END: NORMAL
MDC_IDC_STAT_TACHYTHERAPY_RECENT_DTM_START: NORMAL
MDC_IDC_STAT_TACHYTHERAPY_TOTAL_DTM_END: NORMAL
MDC_IDC_STAT_TACHYTHERAPY_TOTAL_DTM_START: NORMAL

## 2025-01-06 PROCEDURE — 93280 PM DEVICE PROGR EVAL DUAL: CPT | Performed by: INTERNAL MEDICINE

## 2025-01-20 ENCOUNTER — TRANSFERRED RECORDS (OUTPATIENT)
Dept: HEALTH INFORMATION MANAGEMENT | Facility: CLINIC | Age: 75
End: 2025-01-20
Payer: COMMERCIAL

## 2025-01-27 ENCOUNTER — LAB (OUTPATIENT)
Dept: LAB | Facility: CLINIC | Age: 75
End: 2025-01-27
Payer: COMMERCIAL

## 2025-01-27 DIAGNOSIS — R73.01 IMPAIRED FASTING GLUCOSE: ICD-10-CM

## 2025-01-27 DIAGNOSIS — I10 ESSENTIAL HYPERTENSION: ICD-10-CM

## 2025-01-27 DIAGNOSIS — Z13.220 SCREENING FOR HYPERLIPIDEMIA: ICD-10-CM

## 2025-01-27 LAB
ANION GAP SERPL CALCULATED.3IONS-SCNC: 9 MMOL/L (ref 7–15)
BUN SERPL-MCNC: 16.6 MG/DL (ref 8–23)
CALCIUM SERPL-MCNC: 9.6 MG/DL (ref 8.8–10.4)
CHLORIDE SERPL-SCNC: 101 MMOL/L (ref 98–107)
CHOLEST SERPL-MCNC: 197 MG/DL
CREAT SERPL-MCNC: 0.93 MG/DL (ref 0.51–0.95)
EGFRCR SERPLBLD CKD-EPI 2021: 63 ML/MIN/1.73M2
ERYTHROCYTE [DISTWIDTH] IN BLOOD BY AUTOMATED COUNT: 13.5 % (ref 10–15)
EST. AVERAGE GLUCOSE BLD GHB EST-MCNC: 120 MG/DL
FASTING STATUS PATIENT QL REPORTED: YES
FASTING STATUS PATIENT QL REPORTED: YES
GLUCOSE SERPL-MCNC: 119 MG/DL (ref 70–99)
HBA1C MFR BLD: 5.8 % (ref 0–5.6)
HCO3 SERPL-SCNC: 31 MMOL/L (ref 22–29)
HCT VFR BLD AUTO: 41.7 % (ref 35–47)
HDLC SERPL-MCNC: 98 MG/DL
HGB BLD-MCNC: 13.5 G/DL (ref 11.7–15.7)
LDLC SERPL CALC-MCNC: 82 MG/DL
MCH RBC QN AUTO: 31.5 PG (ref 26.5–33)
MCHC RBC AUTO-ENTMCNC: 32.4 G/DL (ref 31.5–36.5)
MCV RBC AUTO: 97 FL (ref 78–100)
NONHDLC SERPL-MCNC: 99 MG/DL
PLATELET # BLD AUTO: 217 10E3/UL (ref 150–450)
POTASSIUM SERPL-SCNC: 3.7 MMOL/L (ref 3.4–5.3)
RBC # BLD AUTO: 4.28 10E6/UL (ref 3.8–5.2)
SODIUM SERPL-SCNC: 141 MMOL/L (ref 135–145)
TRIGL SERPL-MCNC: 87 MG/DL
WBC # BLD AUTO: 5.5 10E3/UL (ref 4–11)

## 2025-01-27 PROCEDURE — 80061 LIPID PANEL: CPT | Mod: GZ

## 2025-01-27 PROCEDURE — 36415 COLL VENOUS BLD VENIPUNCTURE: CPT

## 2025-01-27 PROCEDURE — 83036 HEMOGLOBIN GLYCOSYLATED A1C: CPT

## 2025-01-27 PROCEDURE — 80048 BASIC METABOLIC PNL TOTAL CA: CPT

## 2025-01-27 PROCEDURE — 85027 COMPLETE CBC AUTOMATED: CPT

## 2025-01-28 PROBLEM — R14.0 ABDOMINAL BLOATING: Status: ACTIVE | Noted: 2025-01-28

## 2025-01-28 PROBLEM — R14.0 ABDOMINAL DISTENSION, GASEOUS: Status: ACTIVE | Noted: 2024-11-08

## 2025-03-09 DIAGNOSIS — I10 ESSENTIAL HYPERTENSION: ICD-10-CM

## 2025-03-10 RX ORDER — LOSARTAN POTASSIUM 25 MG/1
25 TABLET ORAL DAILY
Qty: 90 TABLET | Refills: 2 | Status: SHIPPED | OUTPATIENT
Start: 2025-03-10

## 2025-04-09 ENCOUNTER — ANCILLARY PROCEDURE (OUTPATIENT)
Dept: CARDIOLOGY | Facility: CLINIC | Age: 75
End: 2025-04-09
Attending: INTERNAL MEDICINE
Payer: COMMERCIAL

## 2025-04-09 DIAGNOSIS — Z95.0 CARDIAC PACEMAKER IN SITU: ICD-10-CM

## 2025-04-09 DIAGNOSIS — I49.5 SICK SINUS SYNDROME (H): ICD-10-CM

## 2025-04-09 LAB
MDC_IDC_LEAD_CONNECTION_STATUS: NORMAL
MDC_IDC_LEAD_CONNECTION_STATUS: NORMAL
MDC_IDC_LEAD_IMPLANT_DT: NORMAL
MDC_IDC_LEAD_IMPLANT_DT: NORMAL
MDC_IDC_LEAD_LOCATION: NORMAL
MDC_IDC_LEAD_LOCATION: NORMAL
MDC_IDC_LEAD_MFG: NORMAL
MDC_IDC_LEAD_MFG: NORMAL
MDC_IDC_LEAD_MODEL: NORMAL
MDC_IDC_LEAD_MODEL: NORMAL
MDC_IDC_LEAD_POLARITY_TYPE: NORMAL
MDC_IDC_LEAD_POLARITY_TYPE: NORMAL
MDC_IDC_LEAD_SERIAL: NORMAL
MDC_IDC_LEAD_SERIAL: NORMAL
MDC_IDC_LEAD_SPECIAL_FUNCTION: NORMAL
MDC_IDC_LEAD_SPECIAL_FUNCTION: NORMAL
MDC_IDC_MSMT_BATTERY_DTM: NORMAL
MDC_IDC_MSMT_BATTERY_REMAINING_LONGEVITY: 162 MO
MDC_IDC_MSMT_BATTERY_RRT_TRIGGER: 2.62
MDC_IDC_MSMT_BATTERY_STATUS: NORMAL
MDC_IDC_MSMT_BATTERY_VOLTAGE: 3.21 V
MDC_IDC_MSMT_LEADCHNL_RA_IMPEDANCE_VALUE: 304 OHM
MDC_IDC_MSMT_LEADCHNL_RA_IMPEDANCE_VALUE: 437 OHM
MDC_IDC_MSMT_LEADCHNL_RA_PACING_THRESHOLD_AMPLITUDE: 0.75 V
MDC_IDC_MSMT_LEADCHNL_RA_PACING_THRESHOLD_PULSEWIDTH: 0.4 MS
MDC_IDC_MSMT_LEADCHNL_RA_SENSING_INTR_AMPL: 1 MV
MDC_IDC_MSMT_LEADCHNL_RA_SENSING_INTR_AMPL: 1 MV
MDC_IDC_MSMT_LEADCHNL_RV_IMPEDANCE_VALUE: 361 OHM
MDC_IDC_MSMT_LEADCHNL_RV_IMPEDANCE_VALUE: 437 OHM
MDC_IDC_MSMT_LEADCHNL_RV_SENSING_INTR_AMPL: 8.62 MV
MDC_IDC_MSMT_LEADCHNL_RV_SENSING_INTR_AMPL: 8.62 MV
MDC_IDC_PG_IMPLANT_DTM: NORMAL
MDC_IDC_PG_MFG: NORMAL
MDC_IDC_PG_MODEL: NORMAL
MDC_IDC_PG_SERIAL: NORMAL
MDC_IDC_PG_TYPE: NORMAL
MDC_IDC_SESS_CLINIC_NAME: NORMAL
MDC_IDC_SESS_DTM: NORMAL
MDC_IDC_SESS_TYPE: NORMAL
MDC_IDC_SET_BRADY_AT_MODE_SWITCH_RATE: 171 {BEATS}/MIN
MDC_IDC_SET_BRADY_HYSTRATE: NORMAL
MDC_IDC_SET_BRADY_LOWRATE: 60 {BEATS}/MIN
MDC_IDC_SET_BRADY_MAX_SENSOR_RATE: 130 {BEATS}/MIN
MDC_IDC_SET_BRADY_MAX_TRACKING_RATE: 130 {BEATS}/MIN
MDC_IDC_SET_BRADY_MODE: NORMAL
MDC_IDC_SET_BRADY_PAV_DELAY_LOW: 180 MS
MDC_IDC_SET_BRADY_SAV_DELAY_LOW: 150 MS
MDC_IDC_SET_LEADCHNL_RA_PACING_AMPLITUDE: 1.5 V
MDC_IDC_SET_LEADCHNL_RA_PACING_ANODE_ELECTRODE_1: NORMAL
MDC_IDC_SET_LEADCHNL_RA_PACING_ANODE_LOCATION_1: NORMAL
MDC_IDC_SET_LEADCHNL_RA_PACING_CAPTURE_MODE: NORMAL
MDC_IDC_SET_LEADCHNL_RA_PACING_CATHODE_ELECTRODE_1: NORMAL
MDC_IDC_SET_LEADCHNL_RA_PACING_CATHODE_LOCATION_1: NORMAL
MDC_IDC_SET_LEADCHNL_RA_PACING_POLARITY: NORMAL
MDC_IDC_SET_LEADCHNL_RA_PACING_PULSEWIDTH: 0.4 MS
MDC_IDC_SET_LEADCHNL_RA_SENSING_ANODE_ELECTRODE_1: NORMAL
MDC_IDC_SET_LEADCHNL_RA_SENSING_ANODE_LOCATION_1: NORMAL
MDC_IDC_SET_LEADCHNL_RA_SENSING_CATHODE_ELECTRODE_1: NORMAL
MDC_IDC_SET_LEADCHNL_RA_SENSING_CATHODE_LOCATION_1: NORMAL
MDC_IDC_SET_LEADCHNL_RA_SENSING_POLARITY: NORMAL
MDC_IDC_SET_LEADCHNL_RA_SENSING_SENSITIVITY: 0.3 MV
MDC_IDC_SET_LEADCHNL_RV_PACING_AMPLITUDE: 2.25 V
MDC_IDC_SET_LEADCHNL_RV_PACING_ANODE_ELECTRODE_1: NORMAL
MDC_IDC_SET_LEADCHNL_RV_PACING_ANODE_LOCATION_1: NORMAL
MDC_IDC_SET_LEADCHNL_RV_PACING_CAPTURE_MODE: NORMAL
MDC_IDC_SET_LEADCHNL_RV_PACING_CATHODE_ELECTRODE_1: NORMAL
MDC_IDC_SET_LEADCHNL_RV_PACING_CATHODE_LOCATION_1: NORMAL
MDC_IDC_SET_LEADCHNL_RV_PACING_POLARITY: NORMAL
MDC_IDC_SET_LEADCHNL_RV_PACING_PULSEWIDTH: 1 MS
MDC_IDC_SET_LEADCHNL_RV_SENSING_ANODE_ELECTRODE_1: NORMAL
MDC_IDC_SET_LEADCHNL_RV_SENSING_ANODE_LOCATION_1: NORMAL
MDC_IDC_SET_LEADCHNL_RV_SENSING_CATHODE_ELECTRODE_1: NORMAL
MDC_IDC_SET_LEADCHNL_RV_SENSING_CATHODE_LOCATION_1: NORMAL
MDC_IDC_SET_LEADCHNL_RV_SENSING_POLARITY: NORMAL
MDC_IDC_SET_LEADCHNL_RV_SENSING_SENSITIVITY: 0.9 MV
MDC_IDC_SET_ZONE_DETECTION_INTERVAL: 350 MS
MDC_IDC_SET_ZONE_DETECTION_INTERVAL: 400 MS
MDC_IDC_SET_ZONE_STATUS: NORMAL
MDC_IDC_SET_ZONE_STATUS: NORMAL
MDC_IDC_SET_ZONE_TYPE: NORMAL
MDC_IDC_SET_ZONE_VENDOR_TYPE: NORMAL
MDC_IDC_STAT_AT_BURDEN_PERCENT: 0 %
MDC_IDC_STAT_AT_DTM_END: NORMAL
MDC_IDC_STAT_AT_DTM_START: NORMAL
MDC_IDC_STAT_BRADY_AP_VP_PERCENT: 0.01 %
MDC_IDC_STAT_BRADY_AP_VS_PERCENT: 84.84 %
MDC_IDC_STAT_BRADY_AS_VP_PERCENT: 0 %
MDC_IDC_STAT_BRADY_AS_VS_PERCENT: 15.16 %
MDC_IDC_STAT_BRADY_DTM_END: NORMAL
MDC_IDC_STAT_BRADY_DTM_START: NORMAL
MDC_IDC_STAT_BRADY_RA_PERCENT_PACED: 84.88 %
MDC_IDC_STAT_BRADY_RV_PERCENT_PACED: 0.01 %
MDC_IDC_STAT_EPISODE_RECENT_COUNT: 0
MDC_IDC_STAT_EPISODE_RECENT_COUNT_DTM_END: NORMAL
MDC_IDC_STAT_EPISODE_RECENT_COUNT_DTM_START: NORMAL
MDC_IDC_STAT_EPISODE_TOTAL_COUNT: 0
MDC_IDC_STAT_EPISODE_TOTAL_COUNT_DTM_END: NORMAL
MDC_IDC_STAT_EPISODE_TOTAL_COUNT_DTM_START: NORMAL
MDC_IDC_STAT_EPISODE_TYPE: NORMAL
MDC_IDC_STAT_TACHYTHERAPY_RECENT_DTM_END: NORMAL
MDC_IDC_STAT_TACHYTHERAPY_RECENT_DTM_START: NORMAL
MDC_IDC_STAT_TACHYTHERAPY_TOTAL_DTM_END: NORMAL
MDC_IDC_STAT_TACHYTHERAPY_TOTAL_DTM_START: NORMAL

## 2025-04-11 PROCEDURE — 93296 REM INTERROG EVL PM/IDS: CPT | Performed by: INTERNAL MEDICINE

## 2025-04-11 PROCEDURE — 93294 REM INTERROG EVL PM/LDLS PM: CPT | Performed by: INTERNAL MEDICINE

## 2025-06-25 DIAGNOSIS — Z91.89 FRACTURE RISK ASSESSMENT SCORE (FRAX) INDICATING GREATER THAN 3% RISK FOR HIP FRACTURE: ICD-10-CM

## 2025-06-25 RX ORDER — ALENDRONATE SODIUM 70 MG/1
TABLET ORAL
Qty: 12 TABLET | Refills: 3 | Status: SHIPPED | OUTPATIENT
Start: 2025-06-25

## 2025-07-07 ENCOUNTER — ANCILLARY PROCEDURE (OUTPATIENT)
Dept: MAMMOGRAPHY | Facility: CLINIC | Age: 75
End: 2025-07-07
Attending: FAMILY MEDICINE
Payer: COMMERCIAL

## 2025-07-07 DIAGNOSIS — Z12.31 VISIT FOR SCREENING MAMMOGRAM: ICD-10-CM

## 2025-07-07 PROCEDURE — 77063 BREAST TOMOSYNTHESIS BI: CPT

## 2025-07-24 ENCOUNTER — MYC MEDICAL ADVICE (OUTPATIENT)
Dept: FAMILY MEDICINE | Facility: CLINIC | Age: 75
End: 2025-07-24

## 2025-08-26 ENCOUNTER — MYC REFILL (OUTPATIENT)
Dept: FAMILY MEDICINE | Facility: CLINIC | Age: 75
End: 2025-08-26
Payer: COMMERCIAL

## 2025-08-26 DIAGNOSIS — I10 ESSENTIAL HYPERTENSION: ICD-10-CM

## 2025-08-26 RX ORDER — LOSARTAN POTASSIUM 25 MG/1
25 TABLET ORAL DAILY
Qty: 90 TABLET | Refills: 1 | OUTPATIENT
Start: 2025-08-26

## (undated) DEVICE — TUBING SMOKE EVAC 3/8"X10' 0702-045-023

## (undated) DEVICE — ELECTRODE DEFIB CADENCE 22550R

## (undated) DEVICE — ESU BLADE PEAK PLASMA 3.0S PS210-030S

## (undated) DEVICE — CUSTOM PACK PACER ICD SAN5BPCHEA

## (undated) RX ORDER — FENTANYL CITRATE 50 UG/ML
INJECTION, SOLUTION INTRAMUSCULAR; INTRAVENOUS
Status: DISPENSED
Start: 2024-12-27

## (undated) RX ORDER — VANCOMYCIN HYDROCHLORIDE 1 G/200ML
INJECTION, SOLUTION INTRAVENOUS
Status: DISPENSED
Start: 2024-12-27